# Patient Record
Sex: MALE | Race: WHITE | NOT HISPANIC OR LATINO | Employment: OTHER | URBAN - METROPOLITAN AREA
[De-identification: names, ages, dates, MRNs, and addresses within clinical notes are randomized per-mention and may not be internally consistent; named-entity substitution may affect disease eponyms.]

---

## 2019-10-22 ENCOUNTER — APPOINTMENT (OUTPATIENT)
Dept: RADIOLOGY | Facility: MEDICAL CENTER | Age: 84
DRG: 682 | End: 2019-10-22
Attending: FAMILY MEDICINE
Payer: MEDICARE

## 2019-10-22 ENCOUNTER — HOSPITAL ENCOUNTER (OUTPATIENT)
Dept: LAB | Facility: MEDICAL CENTER | Age: 84
End: 2019-10-22
Attending: FAMILY MEDICINE
Payer: MEDICARE

## 2019-10-22 DIAGNOSIS — I63.9 CEREBROVASCULAR ACCIDENT (CVA), UNSPECIFIED MECHANISM (HCC): ICD-10-CM

## 2019-10-22 LAB — CREAT SERPL-MCNC: 7.3 MG/DL (ref 0.5–1.4)

## 2019-10-22 PROCEDURE — 36415 COLL VENOUS BLD VENIPUNCTURE: CPT

## 2019-10-22 PROCEDURE — 82565 ASSAY OF CREATININE: CPT

## 2019-10-22 PROCEDURE — 70551 MRI BRAIN STEM W/O DYE: CPT

## 2019-10-24 ENCOUNTER — APPOINTMENT (OUTPATIENT)
Dept: RADIOLOGY | Facility: MEDICAL CENTER | Age: 84
DRG: 682 | End: 2019-10-24
Attending: HOSPITALIST
Payer: MEDICARE

## 2019-10-24 ENCOUNTER — HOSPITAL ENCOUNTER (INPATIENT)
Facility: MEDICAL CENTER | Age: 84
LOS: 7 days | DRG: 682 | End: 2019-10-31
Attending: EMERGENCY MEDICINE | Admitting: HOSPITALIST
Payer: MEDICARE

## 2019-10-24 DIAGNOSIS — N17.9 ACUTE RENAL FAILURE, UNSPECIFIED ACUTE RENAL FAILURE TYPE (HCC): ICD-10-CM

## 2019-10-24 PROBLEM — I10 HTN (HYPERTENSION): Status: ACTIVE | Noted: 2019-10-24

## 2019-10-24 PROBLEM — E03.9 HYPOTHYROIDISM: Status: ACTIVE | Noted: 2019-10-24

## 2019-10-24 PROBLEM — R93.89 ABNORMAL MRI: Status: ACTIVE | Noted: 2019-10-24

## 2019-10-24 PROBLEM — E78.5 HLD (HYPERLIPIDEMIA): Status: ACTIVE | Noted: 2019-10-24

## 2019-10-24 LAB
ALBUMIN SERPL BCP-MCNC: 3.1 G/DL (ref 3.2–4.9)
ALBUMIN/GLOB SERPL: 1.2 G/DL
ALP SERPL-CCNC: 53 U/L (ref 30–99)
ALT SERPL-CCNC: 10 U/L (ref 2–50)
ANION GAP SERPL CALC-SCNC: 10 MMOL/L (ref 0–11.9)
APPEARANCE UR: CLEAR
APTT PPP: 32.8 SEC (ref 24.7–36)
AST SERPL-CCNC: 7 U/L (ref 12–45)
BACTERIA #/AREA URNS HPF: NEGATIVE /HPF
BASOPHILS # BLD AUTO: 0.8 % (ref 0–1.8)
BASOPHILS # BLD: 0.05 K/UL (ref 0–0.12)
BILIRUB SERPL-MCNC: 0.4 MG/DL (ref 0.1–1.5)
BILIRUB UR QL STRIP.AUTO: NEGATIVE
BUN SERPL-MCNC: 61 MG/DL (ref 8–22)
CA-I SERPL-SCNC: 1.3 MMOL/L (ref 1.1–1.3)
CALCIUM SERPL-MCNC: 7.8 MG/DL (ref 8.5–10.5)
CHLORIDE SERPL-SCNC: 119 MMOL/L (ref 96–112)
CHLORIDE UR-SCNC: 28 MMOL/L
CO2 SERPL-SCNC: 17 MMOL/L (ref 20–33)
COLOR UR: YELLOW
CREAT SERPL-MCNC: 5.97 MG/DL (ref 0.5–1.4)
CREAT UR-MCNC: 75.3 MG/DL
EOSINOPHIL # BLD AUTO: 0.22 K/UL (ref 0–0.51)
EOSINOPHIL NFR BLD: 3.5 % (ref 0–6.9)
EPI CELLS #/AREA URNS HPF: NEGATIVE /HPF
ERYTHROCYTE [DISTWIDTH] IN BLOOD BY AUTOMATED COUNT: 53.3 FL (ref 35.9–50)
GLOBULIN SER CALC-MCNC: 2.5 G/DL (ref 1.9–3.5)
GLUCOSE SERPL-MCNC: 93 MG/DL (ref 65–99)
GLUCOSE UR STRIP.AUTO-MCNC: NEGATIVE MG/DL
HCT VFR BLD AUTO: 39.2 % (ref 42–52)
HGB BLD-MCNC: 12.6 G/DL (ref 14–18)
HYALINE CASTS #/AREA URNS LPF: ABNORMAL /LPF
IMM GRANULOCYTES # BLD AUTO: 0.02 K/UL (ref 0–0.11)
IMM GRANULOCYTES NFR BLD AUTO: 0.3 % (ref 0–0.9)
INR PPP: 1.01 (ref 0.87–1.13)
KETONES UR STRIP.AUTO-MCNC: NEGATIVE MG/DL
LEUKOCYTE ESTERASE UR QL STRIP.AUTO: ABNORMAL
LYMPHOCYTES # BLD AUTO: 0.82 K/UL (ref 1–4.8)
LYMPHOCYTES NFR BLD: 13.1 % (ref 22–41)
MCH RBC QN AUTO: 31.2 PG (ref 27–33)
MCHC RBC AUTO-ENTMCNC: 32.1 G/DL (ref 33.7–35.3)
MCV RBC AUTO: 97 FL (ref 81.4–97.8)
MICRO URNS: ABNORMAL
MONOCYTES # BLD AUTO: 0.62 K/UL (ref 0–0.85)
MONOCYTES NFR BLD AUTO: 9.9 % (ref 0–13.4)
NEUTROPHILS # BLD AUTO: 4.51 K/UL (ref 1.82–7.42)
NEUTROPHILS NFR BLD: 72.4 % (ref 44–72)
NITRITE UR QL STRIP.AUTO: NEGATIVE
NRBC # BLD AUTO: 0 K/UL
NRBC BLD-RTO: 0 /100 WBC
NT-PROBNP SERPL IA-MCNC: 949 PG/ML (ref 0–125)
PH UR STRIP.AUTO: 5 [PH] (ref 5–8)
PLATELET # BLD AUTO: 215 K/UL (ref 164–446)
PMV BLD AUTO: 10.1 FL (ref 9–12.9)
POTASSIUM SERPL-SCNC: 4.3 MMOL/L (ref 3.6–5.5)
POTASSIUM UR-SCNC: 18.5 MMOL/L
PROT SERPL-MCNC: 5.6 G/DL (ref 6–8.2)
PROT UR QL STRIP: NEGATIVE MG/DL
PROT UR-MCNC: 5.5 MG/DL (ref 0–15)
PROTHROMBIN TIME: 13.5 SEC (ref 12–14.6)
PTH-INTACT SERPL-MCNC: 173.2 PG/ML (ref 14–72)
RBC # BLD AUTO: 4.04 M/UL (ref 4.7–6.1)
RBC # URNS HPF: ABNORMAL /HPF
RBC UR QL AUTO: NEGATIVE
SODIUM SERPL-SCNC: 146 MMOL/L (ref 135–145)
SODIUM UR-SCNC: 35 MMOL/L
SP GR UR STRIP.AUTO: 1.01
UROBILINOGEN UR STRIP.AUTO-MCNC: 0.2 MG/DL
WBC # BLD AUTO: 6.2 K/UL (ref 4.8–10.8)
WBC #/AREA URNS HPF: ABNORMAL /HPF

## 2019-10-24 PROCEDURE — 82306 VITAMIN D 25 HYDROXY: CPT

## 2019-10-24 PROCEDURE — 85025 COMPLETE CBC W/AUTO DIFF WBC: CPT

## 2019-10-24 PROCEDURE — 86160 COMPLEMENT ANTIGEN: CPT

## 2019-10-24 PROCEDURE — 85730 THROMBOPLASTIN TIME PARTIAL: CPT

## 2019-10-24 PROCEDURE — A9270 NON-COVERED ITEM OR SERVICE: HCPCS | Performed by: HOSPITALIST

## 2019-10-24 PROCEDURE — 700105 HCHG RX REV CODE 258: Performed by: HOSPITALIST

## 2019-10-24 PROCEDURE — 80053 COMPREHEN METABOLIC PANEL: CPT

## 2019-10-24 PROCEDURE — 99285 EMERGENCY DEPT VISIT HI MDM: CPT

## 2019-10-24 PROCEDURE — 99223 1ST HOSP IP/OBS HIGH 75: CPT | Performed by: PSYCHIATRY & NEUROLOGY

## 2019-10-24 PROCEDURE — 84133 ASSAY OF URINE POTASSIUM: CPT

## 2019-10-24 PROCEDURE — 81002 URINALYSIS NONAUTO W/O SCOPE: CPT | Performed by: EMERGENCY MEDICINE

## 2019-10-24 PROCEDURE — 83970 ASSAY OF PARATHORMONE: CPT

## 2019-10-24 PROCEDURE — 84300 ASSAY OF URINE SODIUM: CPT

## 2019-10-24 PROCEDURE — 700111 HCHG RX REV CODE 636 W/ 250 OVERRIDE (IP): Performed by: HOSPITALIST

## 2019-10-24 PROCEDURE — 85610 PROTHROMBIN TIME: CPT

## 2019-10-24 PROCEDURE — 83880 ASSAY OF NATRIURETIC PEPTIDE: CPT

## 2019-10-24 PROCEDURE — 82436 ASSAY OF URINE CHLORIDE: CPT

## 2019-10-24 PROCEDURE — 76775 US EXAM ABDO BACK WALL LIM: CPT

## 2019-10-24 PROCEDURE — 82570 ASSAY OF URINE CREATININE: CPT

## 2019-10-24 PROCEDURE — 99223 1ST HOSP IP/OBS HIGH 75: CPT | Mod: AI | Performed by: HOSPITALIST

## 2019-10-24 PROCEDURE — 82330 ASSAY OF CALCIUM: CPT

## 2019-10-24 PROCEDURE — 700102 HCHG RX REV CODE 250 W/ 637 OVERRIDE(OP): Performed by: HOSPITALIST

## 2019-10-24 PROCEDURE — 81001 URINALYSIS AUTO W/SCOPE: CPT

## 2019-10-24 PROCEDURE — 770006 HCHG ROOM/CARE - MED/SURG/GYN SEMI*

## 2019-10-24 PROCEDURE — 84156 ASSAY OF PROTEIN URINE: CPT

## 2019-10-24 RX ORDER — M-VIT,TX,IRON,MINS/CALC/FOLIC 27MG-0.4MG
1 TABLET ORAL DAILY
COMMUNITY

## 2019-10-24 RX ORDER — LEVOTHYROXINE SODIUM 88 UG/1
88 TABLET ORAL
COMMUNITY

## 2019-10-24 RX ORDER — SODIUM CHLORIDE, SODIUM LACTATE, POTASSIUM CHLORIDE, CALCIUM CHLORIDE 600; 310; 30; 20 MG/100ML; MG/100ML; MG/100ML; MG/100ML
INJECTION, SOLUTION INTRAVENOUS CONTINUOUS
Status: DISCONTINUED | OUTPATIENT
Start: 2019-10-24 | End: 2019-10-31 | Stop reason: HOSPADM

## 2019-10-24 RX ORDER — SIMVASTATIN 20 MG
20 TABLET ORAL NIGHTLY
COMMUNITY

## 2019-10-24 RX ORDER — BISACODYL 10 MG
10 SUPPOSITORY, RECTAL RECTAL
Status: DISCONTINUED | OUTPATIENT
Start: 2019-10-24 | End: 2019-10-31 | Stop reason: HOSPADM

## 2019-10-24 RX ORDER — FUROSEMIDE 20 MG/1
20 TABLET ORAL EVERY MORNING
COMMUNITY

## 2019-10-24 RX ORDER — ACETAMINOPHEN 325 MG/1
650 TABLET ORAL EVERY 6 HOURS PRN
Status: DISCONTINUED | OUTPATIENT
Start: 2019-10-24 | End: 2019-10-31 | Stop reason: HOSPADM

## 2019-10-24 RX ORDER — POLYETHYLENE GLYCOL 3350 17 G/17G
1 POWDER, FOR SOLUTION ORAL
Status: DISCONTINUED | OUTPATIENT
Start: 2019-10-24 | End: 2019-10-31 | Stop reason: HOSPADM

## 2019-10-24 RX ORDER — AMOXICILLIN 250 MG
2 CAPSULE ORAL 2 TIMES DAILY
Status: DISCONTINUED | OUTPATIENT
Start: 2019-10-24 | End: 2019-10-31 | Stop reason: HOSPADM

## 2019-10-24 RX ORDER — LOSARTAN POTASSIUM 100 MG/1
100 TABLET ORAL DAILY
COMMUNITY

## 2019-10-24 RX ORDER — ALLOPURINOL 100 MG/1
100 TABLET ORAL DAILY
COMMUNITY

## 2019-10-24 RX ORDER — HEPARIN SODIUM 5000 [USP'U]/ML
5000 INJECTION, SOLUTION INTRAVENOUS; SUBCUTANEOUS EVERY 8 HOURS
Status: DISCONTINUED | OUTPATIENT
Start: 2019-10-24 | End: 2019-10-25

## 2019-10-24 RX ADMIN — SODIUM CHLORIDE, POTASSIUM CHLORIDE, SODIUM LACTATE AND CALCIUM CHLORIDE: 600; 310; 30; 20 INJECTION, SOLUTION INTRAVENOUS at 23:55

## 2019-10-24 RX ADMIN — SENNOSIDES AND DOCUSATE SODIUM 2 TABLET: 8.6; 5 TABLET ORAL at 23:55

## 2019-10-24 RX ADMIN — HEPARIN SODIUM 5000 UNITS: 5000 INJECTION, SOLUTION INTRAVENOUS; SUBCUTANEOUS at 23:55

## 2019-10-24 ASSESSMENT — ENCOUNTER SYMPTOMS
WEAKNESS: 1
CHILLS: 0
TINGLING: 0
DEPRESSION: 0
SORE THROAT: 0
ABDOMINAL PAIN: 0
SHORTNESS OF BREATH: 0
HEADACHES: 0
DIZZINESS: 0
WHEEZING: 0
FOCAL WEAKNESS: 0
VOMITING: 0
COUGH: 0
DIARRHEA: 0
MYALGIAS: 0
FEVER: 0
NAUSEA: 0
PHOTOPHOBIA: 0
PALPITATIONS: 0

## 2019-10-24 ASSESSMENT — LIFESTYLE VARIABLES: DO YOU DRINK ALCOHOL: NO

## 2019-10-25 LAB
25(OH)D3 SERPL-MCNC: 39 NG/ML (ref 30–100)
ANION GAP SERPL CALC-SCNC: 9 MMOL/L (ref 0–11.9)
BUN SERPL-MCNC: 65 MG/DL (ref 8–22)
C3 SERPL-MCNC: 126 MG/DL (ref 87–200)
C4 SERPL-MCNC: 27 MG/DL (ref 19–52)
CALCIUM SERPL-MCNC: 9.2 MG/DL (ref 8.5–10.5)
CHLORIDE SERPL-SCNC: 117 MMOL/L (ref 96–112)
CO2 SERPL-SCNC: 18 MMOL/L (ref 20–33)
CREAT SERPL-MCNC: 6.49 MG/DL (ref 0.5–1.4)
ERYTHROCYTE [DISTWIDTH] IN BLOOD BY AUTOMATED COUNT: 52.6 FL (ref 35.9–50)
GLUCOSE SERPL-MCNC: 92 MG/DL (ref 65–99)
HCT VFR BLD AUTO: 37.7 % (ref 42–52)
HGB BLD-MCNC: 12.2 G/DL (ref 14–18)
MCH RBC QN AUTO: 31.6 PG (ref 27–33)
MCHC RBC AUTO-ENTMCNC: 32.4 G/DL (ref 33.7–35.3)
MCV RBC AUTO: 97.7 FL (ref 81.4–97.8)
PLATELET # BLD AUTO: 187 K/UL (ref 164–446)
PMV BLD AUTO: 10.4 FL (ref 9–12.9)
POTASSIUM SERPL-SCNC: 4.5 MMOL/L (ref 3.6–5.5)
RBC # BLD AUTO: 3.86 M/UL (ref 4.7–6.1)
SODIUM SERPL-SCNC: 144 MMOL/L (ref 135–145)
WBC # BLD AUTO: 5.4 K/UL (ref 4.8–10.8)

## 2019-10-25 PROCEDURE — 36415 COLL VENOUS BLD VENIPUNCTURE: CPT

## 2019-10-25 PROCEDURE — 99223 1ST HOSP IP/OBS HIGH 75: CPT | Performed by: INTERNAL MEDICINE

## 2019-10-25 PROCEDURE — A9270 NON-COVERED ITEM OR SERVICE: HCPCS | Performed by: HOSPITALIST

## 2019-10-25 PROCEDURE — 700102 HCHG RX REV CODE 250 W/ 637 OVERRIDE(OP): Performed by: HOSPITALIST

## 2019-10-25 PROCEDURE — A4357 BEDSIDE DRAINAGE BAG: HCPCS | Performed by: HOSPITALIST

## 2019-10-25 PROCEDURE — 770006 HCHG ROOM/CARE - MED/SURG/GYN SEMI*

## 2019-10-25 PROCEDURE — 85027 COMPLETE CBC AUTOMATED: CPT

## 2019-10-25 PROCEDURE — 80048 BASIC METABOLIC PNL TOTAL CA: CPT

## 2019-10-25 PROCEDURE — 700105 HCHG RX REV CODE 258: Performed by: HOSPITALIST

## 2019-10-25 PROCEDURE — 99233 SBSQ HOSP IP/OBS HIGH 50: CPT | Performed by: HOSPITALIST

## 2019-10-25 RX ORDER — TAMSULOSIN HYDROCHLORIDE 0.4 MG/1
0.4 CAPSULE ORAL
Status: DISCONTINUED | OUTPATIENT
Start: 2019-10-25 | End: 2019-10-31 | Stop reason: HOSPADM

## 2019-10-25 RX ORDER — OMEPRAZOLE 20 MG/1
40 CAPSULE, DELAYED RELEASE ORAL DAILY
COMMUNITY

## 2019-10-25 RX ORDER — HEPARIN SODIUM 5000 [USP'U]/ML
5000 INJECTION, SOLUTION INTRAVENOUS; SUBCUTANEOUS EVERY 8 HOURS
Status: DISCONTINUED | OUTPATIENT
Start: 2019-10-25 | End: 2019-10-27 | Stop reason: ALTCHOICE

## 2019-10-25 RX ORDER — LEVOTHYROXINE SODIUM 88 UG/1
88 TABLET ORAL
Status: DISCONTINUED | OUTPATIENT
Start: 2019-10-26 | End: 2019-10-31 | Stop reason: HOSPADM

## 2019-10-25 RX ADMIN — TAMSULOSIN HYDROCHLORIDE 0.4 MG: 0.4 CAPSULE ORAL at 13:29

## 2019-10-25 RX ADMIN — SODIUM CHLORIDE, POTASSIUM CHLORIDE, SODIUM LACTATE AND CALCIUM CHLORIDE: 600; 310; 30; 20 INJECTION, SOLUTION INTRAVENOUS at 11:00

## 2019-10-25 ASSESSMENT — ENCOUNTER SYMPTOMS
PHOTOPHOBIA: 0
COUGH: 0
WEAKNESS: 1
DIZZINESS: 0
FOCAL WEAKNESS: 0
NAUSEA: 0
FLANK PAIN: 0
PALPITATIONS: 0
WHEEZING: 0
FEVER: 0
VOMITING: 0
HEADACHES: 0
CHILLS: 0
ABDOMINAL PAIN: 0
MEMORY LOSS: 1
TINGLING: 0
SHORTNESS OF BREATH: 0
MYALGIAS: 0
SORE THROAT: 0
DIARRHEA: 0
DEPRESSION: 0

## 2019-10-25 ASSESSMENT — PATIENT HEALTH QUESTIONNAIRE - PHQ9
1. LITTLE INTEREST OR PLEASURE IN DOING THINGS: NOT AT ALL
2. FEELING DOWN, DEPRESSED, IRRITABLE, OR HOPELESS: NOT AT ALL
SUM OF ALL RESPONSES TO PHQ9 QUESTIONS 1 AND 2: 0

## 2019-10-25 ASSESSMENT — LIFESTYLE VARIABLES
HAVE YOU EVER FELT YOU SHOULD CUT DOWN ON YOUR DRINKING: NO
TOTAL SCORE: 0
ON A TYPICAL DAY WHEN YOU DRINK ALCOHOL HOW MANY DRINKS DO YOU HAVE: 2
EVER FELT BAD OR GUILTY ABOUT YOUR DRINKING: NO
ALCOHOL_USE: YES
HOW MANY TIMES IN THE PAST YEAR HAVE YOU HAD 5 OR MORE DRINKS IN A DAY: 0
DOES PATIENT WANT TO STOP DRINKING: NO
EVER_SMOKED: NEVER
TOTAL SCORE: 0
CONSUMPTION TOTAL: NEGATIVE
EVER HAD A DRINK FIRST THING IN THE MORNING TO STEADY YOUR NERVES TO GET RID OF A HANGOVER: NO
HAVE PEOPLE ANNOYED YOU BY CRITICIZING YOUR DRINKING: NO
TOTAL SCORE: 0
AVERAGE NUMBER OF DAYS PER WEEK YOU HAVE A DRINK CONTAINING ALCOHOL: 7

## 2019-10-25 ASSESSMENT — COGNITIVE AND FUNCTIONAL STATUS - GENERAL
STANDING UP FROM CHAIR USING ARMS: A LITTLE
HELP NEEDED FOR BATHING: A LITTLE
SUGGESTED CMS G CODE MODIFIER DAILY ACTIVITY: CJ
SUGGESTED CMS G CODE MODIFIER MOBILITY: CK
TURNING FROM BACK TO SIDE WHILE IN FLAT BAD: A LITTLE
CLIMB 3 TO 5 STEPS WITH RAILING: A LITTLE
DAILY ACTIVITIY SCORE: 20
WALKING IN HOSPITAL ROOM: A LITTLE
MOBILITY SCORE: 18
MOVING FROM LYING ON BACK TO SITTING ON SIDE OF FLAT BED: A LITTLE
DRESSING REGULAR UPPER BODY CLOTHING: A LITTLE
TOILETING: A LITTLE
DRESSING REGULAR LOWER BODY CLOTHING: A LITTLE
MOVING TO AND FROM BED TO CHAIR: A LITTLE

## 2019-10-25 NOTE — PROGRESS NOTES
Report received from Jarad HERNANDEZ. Pt is almost done with Renal Ultrasound and then transport will bring pt to the unit.

## 2019-10-25 NOTE — ED PROVIDER NOTES
ED Provider Note    CHIEF COMPLAINT  Chief Complaint   Patient presents with   • Sent by MD DYE  Efra Soto is a 84 y.o. male who presents with difficulty performing his daily activities.  The patient states that about a week ago he started having difficulty dressing himself.  The patient's wife had to dress him and therefore they schedule an appointment with the primary care provider.  The patient had an outpatient MRI performed that was abnormal and the patient was instructed to come to the emergency department.  He states he also has some forgetfulness.  He does not have any pain.  He does not have any focal loss of function but states that globally he cannot perform his daily activities.  The patient denies recent alcohol abuse.  He states he does have hypertension but no other medical problems.  He does not take any anticoagulants.  He has not had any chest pain, dyspnea, nor palpitations.  The patient has not had any recent vomiting or diarrhea.    REVIEW OF SYSTEMS  See HPI for further details. All other systems are negative.     PAST MEDICAL HISTORY  No past medical history on file.    [email or web address]    SOCIAL HISTORY  Social History     Socioeconomic History   • Marital status:      Spouse name: Not on file   • Number of children: Not on file   • Years of education: Not on file   • Highest education level: Not on file   Occupational History   • Not on file   Social Needs   • Financial resource strain: Not on file   • Food insecurity:     Worry: Not on file     Inability: Not on file   • Transportation needs:     Medical: Not on file     Non-medical: Not on file   Tobacco Use   • Smoking status: Not on file   Substance and Sexual Activity   • Alcohol use: Not on file   • Drug use: Not on file   • Sexual activity: Not on file   Lifestyle   • Physical activity:     Days per week: Not on file     Minutes per session: Not on file   • Stress: Not on file   Relationships   • Social  "connections:     Talks on phone: Not on file     Gets together: Not on file     Attends Buddhism service: Not on file     Active member of club or organization: Not on file     Attends meetings of clubs or organizations: Not on file     Relationship status: Not on file   • Intimate partner violence:     Fear of current or ex partner: Not on file     Emotionally abused: Not on file     Physically abused: Not on file     Forced sexual activity: Not on file   Other Topics Concern   • Not on file   Social History Narrative   • Not on file       SURGICAL HISTORY  No past surgical history on file.    CURRENT MEDICATIONS  Home Medications    **Home medications have not yet been reviewed for this encounter**         ALLERGIES  No Known Allergies    PHYSICAL EXAM  VITAL SIGNS: /90   Pulse 97   Temp 36.4 °C (97.5 °F) (Temporal)   Resp 20   Ht 1.778 m (5' 10\")   Wt 115.2 kg (254 lb)   SpO2 96%   BMI 36.45 kg/m²       Constitutional: Well developed, Well nourished, No acute distress, Non-toxic appearance.   HENT: Normocephalic, Atraumatic, Bilateral external ears normal, Oropharynx moist, No oral exudates, Nose normal.   Eyes: PERRLA, EOMI, Conjunctiva normal, No discharge.   Neck: Normal range of motion, No tenderness, Supple, No stridor.   Lymphatic: No lymphadenopathy noted.   Cardiovascular: Normal heart rate, Normal rhythm, No murmurs, No rubs, No gallops.   Thorax & Lungs: Normal breath sounds, No respiratory distress, No wheezing, No chest tenderness.   Abdomen: Bowel sounds normal, Soft, No tenderness, No masses, No pulsatile masses.   Skin: Warm, Dry, No erythema, No rash.   Back: No tenderness, No CVA tenderness.    Extremities: Intact distal pulses, No edema, No tenderness, No cyanosis, No clubbing.   Musculoskeletal: Good range of motion in all major joints. No tenderness to palpation or major deformities noted.   Neurologic: Alert & oriented x 3, Normal motor function, Normal sensory function, the " patient does have difficulty with finger-to-nose testing as well as heel-to-shin.  Otherwise he is neurologically intact.  Psychiatric: Affect normal, Judgment normal, Mood normal.     COURSE & MEDICAL DECISION MAKING  Pertinent Labs & Imaging studies reviewed. (See chart for details)  This is an 84-year-old male who presents the emergency department for evaluation for difficulty with performing his daily activities.  The patient had an MRI that was abnormal please see the radiologist report.  I did speak with neurology and they are concerned for possible metabolic etiology as the patient was noted to have an elevation in his creatinine prior to the MRI.  The patient's metabolic panel does show acute renal failure and I suspect this is the source.  Neurology has been consulted.  The patient be admitted to the medicine team.  He is in stable condition and his neurologic exam is unchanged at the time of admission.  Clinically do not appreciate any evidence of an infectious process.    FINAL IMPRESSION  1.  Acute renal failure    Disposition  The patient will be admitted in stable condition      Electronically signed by: Wyatt Lama, 10/24/2019 8:13 PM

## 2019-10-25 NOTE — PROGRESS NOTES
Assumed patient care at 0700. Received report from night shift. Assessment completed. A&Ox4. Denies pain this shift. CBI running. Nephrology and urology following. IV fluids infusing. Fall precautions in place; pt wearing treaded socks, personal possessions and call light placed within reach.  POC discussed with pt, communication board updated. Patient denies any additional needs at this time.

## 2019-10-25 NOTE — ASSESSMENT & PLAN NOTE
Likely due to acute metabolic abnormalities such as uremia in the light of JAIRON  Neurology consulted on admission, will treat metabolic factor first and then reasses  Mental status gradually improving, alert and oriented x3 no need for repeating MRI at this time.

## 2019-10-25 NOTE — ED NOTES
"Agree with triage assessment, no changes noted. Pt reports \"some weakness in left side and numbness in fingers.\" Pt reports pain of 0 out of 10. Pt hooked to monitor. Pt denies any further needs at this time. Call light within reach. Bed in low locked position. Comfort measures provided.     "

## 2019-10-25 NOTE — ED NOTES
US tech informed This RN that the Pt's bladder had over 2L in it. Called to hospitalist for straight cath order and was given order for lockhart placement. Lockhart palced without incident. 2300 mL out. UA sent to Lab. Floor RN updated.

## 2019-10-25 NOTE — CARE PLAN
Problem: Knowledge Deficit  Goal: Knowledge of disease process/condition, treatment plan, diagnostic tests, and medications will improve  Note:   POC discussed with patient during bedside rounds. Questions/concerns addressed.      Problem: Urinary Elimination:  Goal: Ability to reestablish a normal urinary elimination pattern will improve  Note:   Three-way lockhart with CBI in place. Flomax started.

## 2019-10-25 NOTE — ED NOTES
Pt resting in bed with no complaints at this time. Pt to be admitted and is awaiting a room assignment. Discussed POC with Pt. Pt verbalizes understanding. Pt denies any needs at this time. Pt's call light is within reach and bed is in low locked position.

## 2019-10-25 NOTE — H&P
"Hospital Medicine History & Physical Note    Date of Service  10/24/2019    Primary Care Physician  Vini Menjivar M.D.    Consultants  Dr. Lutz, neurology    Code Status  Full    Chief Complaint  Chief Complaint   Patient presents with   • Sent by MD       History of Presenting Illness  84 y.o. male who presented on 10/24/2019 after being sent by his primary care provider for abnormal MRI of the brain.  This is a pleasant gentleman who is alert and oriented at the time of my visit at bedside.  He was sent by his primary care provider for behavioral changes that began in the last month.  The patient states that he woke up one morning and was suddenly unable to \"put on my pants\".  No family members are at bedside to confirm this however the patient does state that he has been feeling more confused than usual, and has had difficulty completing his ADLs which has not been an issue for him in the past.  He denies any focal weaknesses, he has no problems finding the correct words to use or understanding conversations.  He said no headaches or vision changes.  However because of this, he was seen by his primary care provider and MRI was completed.  Additionally, the patient was also recently diagnosed with kidney disease but states that he is not been referred to nephrology and nor has any further work-up been done.  He denies any recent use of contrasted studies and denies any NSAID use.    Review of Systems  Review of Systems   Constitutional: Negative for chills and fever.   HENT: Negative for congestion and sore throat.    Eyes: Negative for photophobia.   Respiratory: Negative for cough, shortness of breath and wheezing.    Cardiovascular: Negative for chest pain and palpitations.   Gastrointestinal: Negative for abdominal pain, diarrhea, nausea and vomiting.   Genitourinary: Negative for dysuria.   Musculoskeletal: Negative for myalgias.   Skin: Negative.    Neurological: Positive for weakness. Negative for " dizziness, tingling, focal weakness and headaches.        Confusion   Psychiatric/Behavioral: Negative for depression and suicidal ideas.       Past Medical History  Hypothyroidism, gout, hyperlipidemia    Surgical History  Patient denies    Family History  Brother committed suicide in July, mother  from an MI in the     Social History  Patient denies any tobacco, stopped drinking several months ago    Allergies  No Known Allergies    Medications  No current facility-administered medications on file prior to encounter.      No current outpatient medications on file prior to encounter.       Physical Exam  Hemodynamics  Temp (24hrs), Av.4 °C (97.5 °F), Min:36.4 °C (97.5 °F), Max:36.4 °C (97.5 °F)   Temperature: 36.4 °C (97.5 °F)  Pulse  Av  Min: 93  Max: 98    Blood Pressure : 140/100      Respiratory      Respiration: 20, Pulse Oximetry: 99 %             Physical Exam   Constitutional: He is oriented to person, place, and time. No distress.   HENT:   Head: Normocephalic and atraumatic.   Right Ear: External ear normal.   Left Ear: External ear normal.   Eyes: EOM are normal. Right eye exhibits no discharge. Left eye exhibits no discharge.   Neck: Neck supple. No JVD present.   Cardiovascular: Normal rate, regular rhythm and normal heart sounds.   Pulmonary/Chest: Effort normal and breath sounds normal. No respiratory distress. He exhibits no tenderness.   Abdominal: Soft. Bowel sounds are normal. He exhibits no distension. There is no tenderness.   Musculoskeletal: He exhibits no edema.   Neurological: He is alert and oriented to person, place, and time. No cranial nerve deficit.   Skin: Skin is dry. He is not diaphoretic. No erythema.   Psychiatric: He has a normal mood and affect. His behavior is normal.   Nursing note and vitals reviewed.    Capillary refill less than 3 seconds, distal pulses intact    Laboratory:  Recent Labs     10/24/19  2000   WBC 6.2   RBC 4.04*   HEMOGLOBIN 12.6*    HEMATOCRIT 39.2*   MCV 97.0   MCH 31.2   MCHC 32.1*   RDW 53.3*   PLATELETCT 215   MPV 10.1     Recent Labs     10/22/19  1320 10/24/19  2000   SODIUM  --  146*   POTASSIUM  --  4.3   CHLORIDE  --  119*   CO2  --  17*   GLUCOSE  --  93   BUN  --  61*   CREATININE 7.30* 5.97*   CALCIUM  --  7.8*     Recent Labs     10/24/19  2000   ALTSGPT 10   ASTSGOT 7*   ALKPHOSPHAT 53   TBILIRUBIN 0.4   GLUCOSE 93     Recent Labs     10/24/19  2000   APTT 32.8   INR 1.01             No results found for: TROPONINI    Imaging  Mr-brain-w/o    Result Date: 10/23/2019  10/22/2019 4:50 PM HISTORY/REASON FOR EXAM:  Cerebrovascular accident (CVA), unspecified mechanism (HCC). Expressive aphasia TECHNIQUE/EXAM DESCRIPTION: MRI of the brain without contrast. T1 sagittal, T2 fast spin-echo axial, T1 coronal, FLAIR coronal, diffusion-weighted and apparent diffusion coefficient (ADC map) axial images were obtained of the whole brain. The study was performed on a Siemens Skyra 3.0 Gricelda MRI scanner. COMPARISON:  None. FINDINGS:  There is gyriform/cortical restricted diffusion involving the bilateral parietal occipital and posterior temporal lobes as well as probably involving the left insular ribbon and mild within the left frontal lobe. There is subtle associated T2 FLAIR hyperintensity. The etiology of this is uncertain however the appearance and bilaterality is atypical for arterial or venous infarct. There is no significant involvement of the deep gray structures. Differential considerations would include seizure  activity/status epilepticus, hypoglycemic encephalopathy, hepatic encephalopathy, hypoxic ischemic injury, Creutzfeldt Thang disease among other things. Short-term follow-up is recommended. Mild to moderate generalized volume loss.  Scattered small T2 hyperintensities in periventricular and subcortical cerebral white matter are nonspecific, most likely mild-to-moderate chronic microvascular ischemic changes. Old small left  parietal lobe encephalomalacia with blooming seen on GRE which could be related to old hemorrhage/infarct. No acute intracranial hemorrhage, extra-axial fluid collection, mass effect, midline shift or hydrocephalus. Proximal vascular flow voids are patent. Paranasal sinuses and mastoids are clear.  Bone marrow signal is normal. Orbital contents are symmetric.     1.  Gyriform/cortical restricted diffusion involving the bilateral parietal, occipital and temporal lobes as well as the left insular ribbon and frontal lobe. This is of uncertain etiology, with differential considerations as detailed above. Short-term follow-up is recommended. 2.  Mild to moderate generalized atrophy and chronic microvascular ischemic type changes. 3.  Small area of encephalomalacia in the left parietal lobe likely related to old hemorrhage/infarct. These findings were discussed with Dr. REMY DOWNS on 10/22/2019 5:15 PM.        Assessment/Plan:  Anticipate that patient will need greater than 2 midnights for management of the discussed medical issues.    * JAIRON (acute kidney injury) (HCC)  Assessment & Plan  Patient states that he was diagnosed with kidney disease recently but had not been referred to nephrology.  I do not have any previous records to confirm but he did have rather significant kidney disease 2 days ago.  We bladder scanned at the patient in the emergency room and he is retaining 2500 cc of urine.  At this point, suspicion would be high for postobstructive disease.  I have requested a Rosa catheter placement and will monitor strict intake and output.  I will check urine electrolytes, PTH, vitamin D, complement levels, BNP, urinalysis, and a renal ultrasound.  I will continue to fluid hydrate him overnight and will repeat chemistries in the morning.  At this point, he does not require emergent dialysis.  He has no improvement then we will plan to consult nephrology.    Abnormal MRI  Assessment & Plan  Given his change  in mentation, his PCP had ordered an outpatient MRI of the brain.  This is come back to show some type of bilateral diffusion abnormality of unknown etiology.  Dr. Lutz of neurology was consulted by the emergency room physician I appreciate his recommendations.  In the meantime, monitor closely with every 4 hour neurology checks.    HLD (hyperlipidemia)  Assessment & Plan  This is chronic, okay to continue home Zocor.    HTN (hypertension)  Assessment & Plan  Holding home losartan for now in light of his JAIRON, monitor blood pressures and add PRN IV hydralazine if needed    Hypothyroidism  Assessment & Plan  Patient does not remember his home dose of his levothyroxine, we will need to obtain updated information from his pharmacy in the morning and resume his home medications.      Prophylaxis: Heparin for DVT prophylaxis, no PPI indicated, bowel protocol as needed

## 2019-10-25 NOTE — ED TRIAGE NOTES
Pt sent for abnormal MRI results of the brain. Pt had memory issues, forgot how to get dressed starting 10/7 with generalized weakness. Pt with shirt on backwards today and incontinent of urine. Pt sent for neurology.

## 2019-10-25 NOTE — CONSULTS
"Hospital Neurology Consult:    Referring Physician: Wyatt Lama M.D.    Reason for consultation: abnormal MRI    HPI: Efra Soto is a 84 y.o. male with history of hypertension, hypothyroidism presenting to the hospital for abnormal MRI and consulted for abnormal MRI/confusion.  Over the past month, the patient has been experiencing worsening cognition.  Specifically as it relates to planning of tasks.  It started with difficulty paying his bills.  He states that he \"has the money, knows he has to pay the bill but cannot wrap his head around paying the bill\".  Approximately 1 week ago, the patient started having difficulty putting his pants on.  He states that his wife who currently has dementia has been helping him do this.  Of note, he is also having some difficulty putting on his shirt however this is more inconsistent.  He does not notice any other deficits such as numbness, weakness, dizziness, double vision.  The patient had an MRI of the brain without contrast performed on 10/24/2019 with findings of restricted diffusion along the cortex and he was sent to the emergency department for further evaluation.    ROS:     As above. All other systems reviewed and are negative.    Past Medical History:   As above    FHx:  No family history of dementia    SHx:   Lives at home with his wife who is demented.  He is a non-smoker.    Allergies:  No Known Allergies    Medications:    Current Facility-Administered Medications:   •  senna-docusate (PERICOLACE or SENOKOT S) 8.6-50 MG per tablet 2 Tab, 2 Tab, Oral, BID **AND** polyethylene glycol/lytes (MIRALAX) PACKET 1 Packet, 1 Packet, Oral, QDAY PRN **AND** magnesium hydroxide (MILK OF MAGNESIA) suspension 30 mL, 30 mL, Oral, QDAY PRN **AND** bisacodyl (DULCOLAX) suppository 10 mg, 10 mg, Rectal, QDAY PRN, Emma Boogie M.D.  •  lactated ringers infusion, 2,000 mL, Intravenous, Continuous, Emma Boogie M.D.  •  heparin injection 5,000 Units, 5,000 Units, " "Subcutaneous, Q8HRS, Emma Boogie M.D.  •  acetaminophen (TYLENOL) tablet 650 mg, 650 mg, Oral, Q6HRS PRN, Emma Boogie M.D.  No current outpatient medications on file.    Vitals:   Vitals:    10/24/19 1855 10/24/19 1856 10/24/19 1948 10/24/19 2032   BP: 132/85  139/90 140/100   Pulse: 98  97 93   Resp: 16  20    Temp: 36.4 °C (97.5 °F)      TempSrc: Temporal      SpO2: 94%  96% 99%   Weight:  115.2 kg (254 lb)     Height: 1.778 m (5' 10\")          Labs:  Lab Results   Component Value Date/Time    PROTHROMBTM 13.5 10/24/2019 08:00 PM    INR 1.01 10/24/2019 08:00 PM      Lab Results   Component Value Date/Time    WBC 6.2 10/24/2019 08:00 PM    RBC 4.04 (L) 10/24/2019 08:00 PM    HEMOGLOBIN 12.6 (L) 10/24/2019 08:00 PM    HEMATOCRIT 39.2 (L) 10/24/2019 08:00 PM    MCV 97.0 10/24/2019 08:00 PM    MCH 31.2 10/24/2019 08:00 PM    MCHC 32.1 (L) 10/24/2019 08:00 PM    MPV 10.1 10/24/2019 08:00 PM    NEUTSPOLYS 72.40 (H) 10/24/2019 08:00 PM    LYMPHOCYTES 13.10 (L) 10/24/2019 08:00 PM    MONOCYTES 9.90 10/24/2019 08:00 PM    EOSINOPHILS 3.50 10/24/2019 08:00 PM    BASOPHILS 0.80 10/24/2019 08:00 PM      Lab Results   Component Value Date/Time    SODIUM 146 (H) 10/24/2019 08:00 PM    POTASSIUM 4.3 10/24/2019 08:00 PM    CHLORIDE 119 (H) 10/24/2019 08:00 PM    CO2 17 (L) 10/24/2019 08:00 PM    GLUCOSE 93 10/24/2019 08:00 PM    BUN 61 (H) 10/24/2019 08:00 PM    CREATININE 5.97 (HH) 10/24/2019 08:00 PM          Lab Results   Component Value Date/Time    ALKPHOSPHAT 53 10/24/2019 08:00 PM    ASTSGOT 7 (L) 10/24/2019 08:00 PM    ALTSGPT 10 10/24/2019 08:00 PM    TBILIRUBIN 0.4 10/24/2019 08:00 PM        Imaging/Testing:  MRI of the brain without contrast on 10/24/2019 was personally reviewed which showed restricted diffusion in the bilateral parietal occipital regions and mild generalized atrophy.    Physical Exam:     General: Well-appearing 84-year-old male in bed no acute distress  Cardio: Normal S1/S2. No peripheral " edema.   Pulm: CTAX2. No respiratory distress.   Skin: Warm, dry, no rashes or lesions   Psychiatric: Appropriate affect. No active psychosis.  HEENT: Atraumatic head, normal sclera and conjunctiva, moist oral mucosa. No lid lag.  Abdomen: Soft, non tender. No masses or hepatosplenomegaly.    Neurologic:  Mental Status: Awake alert and oriented.  Able to follow commands.  Difficulty with three-step commands.  Speech is fluent and non-dysarthric language functions are intact.  Does not have neglect.  Cranial Nerves:  PERRL. EOMi. Face symmetric, palate/tongue midline. Visual fields full to confrontation. Facial sensation intact.   Motor:  Normal muscle tone and bulk. Strength is 5/5 throughout. No abnormal movements.  Reflexes:  2/4 throughout. Flexor plantar responses bilaterally. Absent Bundy's reflex.  Coordination: Finger-to-nose without ataxia  Sensation: Normal to light touch throughout  Gait/Station: Deferred    Assessment/Plan:    Efra Soto is a 84 y.o. male with history of hypertension, hypothyroidism presenting to the hospital for abnormal MRI and consulted for abnormal MRI/confusion.  The patient's MRI showed restricted diffusion in the bilateral parietal occipital region which carries a extensive differential including Creutzfeldt Thang, metabolic encephalopathy, or potential seizure activity.  Of note, prior to MRI being done the patient's creatinine was noted to be 7.  In this regard, it is most likely that the imaging findings are secondary to an underlying encephalopathy due to undiagnosed kidney disease and uremia.  If after correction of these underlying problems the patient's mental status remains unimproved an MRI remains the same then other potential etiologies including Creutzfeldt Thang may be considered.    Plan:  1.  Cognitive deficits likely secondary to metabolic encephalopathy from uremia/kidney disease  2.  Correction of kidney disease per internal medicine  3.  If after  correction of underlying metabolic abnormality the patient's mental status does not improve/MRI is unimproved, call neurology for further recommendations.  4.  Plan discussed with consulting physician and patient's nurse.    Manfred Galvez M.D., Diplomat of the American Board of Psychiatry and Neurology  Diplomat of [facility]   Assistant Clinical Professor, [facility] Neurology Consultant

## 2019-10-25 NOTE — PROGRESS NOTES
· 2 RN skin check complete with Joycelyn RN  · Devices in place- PIV, lockhart catheter, glasses, SCDs   · Skin assessed under devices- yes  · Confirmed pressure ulcers found on N/A  · Also noted- Redness on nose where glasses sit and above right ear- blanching; redness on bottom- blanching   · The following interventions are in place - pressure redistribution mattress, remove glasses while pt is sleeping, q 2 hour turns

## 2019-10-25 NOTE — CONSULTS
DATE OF SERVICE:  10/25/2019    REQUESTING PHYSICIAN:  Elin Marshall MD    REASON FOR CONSULTATION:  Management of acute kidney injury.    The patient seen and examined, medical record reviewed.    HISTORY OF PRESENT ILLNESS:  The patient is a very pleasant 84-year-old   gentleman with a past medical history significant for longstanding   hypertension, history of prostate cancer, developed some change in mental   status about a week ago, had an MRI of the brain, which showed possible   stroke, patient presented to the hospital yesterday sent by his daughter for   encephalopathy and acute kidney injury.  His creatinine was 6.49 earlier   today.  Patient had an abdominal imaging, which showed severe urinary   retention.  He had a Rosa catheter placed and since the Rosa catheter was   placed, he had about 9+ liters of urine output.  Patient is doing better   mentally.  He is still vague on some details, but in general is alert and   oriented x3.  No fever, no chills.  No clear hematuria.    Patient has no recent use of NSAIDs or IV contrast.    PAST MEDICAL HISTORY:  Significant for:  1.  Hypothyroidism.  2.  Gout.    ALLERGIES:  No known drug allergies.    SOCIAL HISTORY:  Patient is .    MEDICATIONS:  Reviewed.    REVIEW OF SYSTEMS:  Patient is weak.  He has decreased appetite.  All other   review of systems were negative except outlined in the history of present   illness.    PHYSICAL EXAMINATION:  GENERAL:  Patient is cachectic, but no apparent distress.  VITAL SIGNS:  Showed blood pressure of 118/87, heart rate was 69, respiratory   rate was 16.  HEENT:  Normocephalic, atraumatic.  Sclerae is anicteric.  Pupils are   reactive.  Nose is normal.  Mucous membrane is moist.  NECK:  No lymphadenopathy, no JVD, no thyroid mass.  CHEST:  Normal.  LUNGS:  Clear to auscultation.  HEART:  S1, S2.  ABDOMEN:  Soft, nontender, no hepatosplenomegaly.  There is no inguinal   lymphadenopathy.  Patient has a Rosa  catheter in place.  EXTREMITIES:  There is trace lower extremity edema.  SKIN:  There is mild erythema on the lower extremity.  NEUROLOGIC:  Patient is alert and oriented x3.  PSYCHIATRIC:  Mood, the patient is anxious.    LABORATORY DATA:  His recent labs were reviewed.  He also had a renal   ultrasound, which I reviewed the image myself, showed bilateral   hydronephrosis.    ASSESSMENT:  1.  Acute kidney injury, the etiology is most likely obstructive uropathy.  2.  Obstructive uropathy.  3.  Encephalopathy, most likely secondary to renal failure.  4.  Anemia.  5.  Metabolic acidosis.  6.  Volume overload.    PLAN:  1.  There is no acute need for renal replacement therapy.  2.  Check urine sodium, creatinine, as well as protein.  3.  IV fluid to replace his post-obstruction diuresis.  4.  Renal diet.  5.  Avoid nephrotoxins like NSAIDs.  6.  Daily labs.  7.  If there is no improvement in the creatinine in the next 24-48 hours, I   will consider dialysis.  8.  Prognosis is guarded.    Plan discussed in detail with Dr. Marshall.       ____________________________________     FADI NAJJAR, MD FN / VANESSA    DD:  10/25/2019 15:09:14  DT:  10/25/2019 15:27:10    D#:  [id number]  Job#:  [id number]

## 2019-10-25 NOTE — ED NOTES
US at bedside.Pt awaiting room assignment. Discussed POC with Pt. Pt verbalizes understanding. Pt denies any needs at this time. Pt's call light is within reach and bed is in low locked position.

## 2019-10-25 NOTE — CARE PLAN
Problem: Safety  Goal: Will remain free from falls  Outcome: PROGRESSING AS EXPECTED  Bed is locked and in lowest position. Call light and table within reach. Non-skid socks on. Bed alarm on.        Problem: Venous Thromboembolism (VTW)/Deep Vein Thrombosis (DVT) Prevention:  Goal: Patient will participate in Venous Thrombosis (VTE)/Deep Vein Thrombosis (DVT)Prevention Measures  Outcome: PROGRESSING AS EXPECTED   Pt is using SCDs at this time. MD DC'd heparin at this time due to blood present in urine.     Problem: Urinary Elimination:  Goal: Ability to reestablish a normal urinary elimination pattern will improve  Outcome: PROGRESSING SLOWER THAN EXPECTED   Pt is experiencing blood urine. Dr. Boogie paged and updated on pts status and Dr. Boogie ordered saline flushes at this time. If bleeding does not subside, CBI to be initiated. Dr. Boogie also okay with heparin being discontinued and SCD order being placed. All orders placed.

## 2019-10-25 NOTE — PROGRESS NOTES
"Hospital Medicine Daily Progress Note    Date of Service  10/25/2019    Chief Complaint  84 y.o. male admitted 10/24/2019 with ams, jennifer    Hospital Course    84 y.o. male who presented on 10/24/2019 after being sent by his primary care provider for abnormal MRI of the brain.  This is a pleasant gentleman who is alert and oriented at the time of my visit at bedside.  He was sent by his primary care provider for behavioral changes that began in the last month.  The patient states that he woke up one morning and was suddenly unable to \"put on my pants\".  No family members are at bedside to confirm this however the patient does state that he has been feeling more confused than usual, and has had difficulty completing his ADLs which has not been an issue for him in the past.  He denies any focal weaknesses, he has no problems finding the correct words to use or understanding conversations.  He said no headaches or vision changes.  However because of this, he was seen by his primary care provider and MRI was completed.  Additionally, the patient was also recently diagnosed with kidney disease but states that he is not been referred to nephrology and nor has any further work-up been done.  He denies any recent use of contrasted studies and denies any NSAID use.      Interval Problem Update  Seen and examined this morning, no family at bedside, patient is alert x3 however states he feels foggy in his head. otherwise no complaints, no abd pain, has a lockhart in place. Wants to eat his breakfast    Consultants/Specialty  Nephrology  urology    Code Status  full    Disposition  tbd    Review of Systems  Review of Systems   Constitutional: Negative for chills and fever.   HENT: Negative for congestion and sore throat.    Eyes: Negative for photophobia.   Respiratory: Negative for cough, shortness of breath and wheezing.    Cardiovascular: Negative for chest pain and palpitations.   Gastrointestinal: Negative for abdominal pain, " diarrhea, nausea and vomiting.   Genitourinary: Negative for dysuria.   Musculoskeletal: Negative for myalgias.   Skin: Negative for itching and rash.   Neurological: Positive for weakness. Negative for dizziness, tingling, focal weakness and headaches.        Confusion   Psychiatric/Behavioral: Positive for memory loss. Negative for depression and suicidal ideas.        Physical Exam  Temp:  [36 °C (96.8 °F)-36.4 °C (97.5 °F)] 36.4 °C (97.5 °F)  Pulse:  [58-98] 69  Resp:  [15-20] 16  BP: (118-146)/() 118/87  SpO2:  [94 %-100 %] 99 %    Physical Exam   Constitutional: He is oriented to person, place, and time. No distress.   HENT:   Head: Normocephalic and atraumatic.   Right Ear: External ear normal.   Left Ear: External ear normal.   Eyes: EOM are normal. Right eye exhibits no discharge. Left eye exhibits no discharge.   Neck: Neck supple. No JVD present.   Cardiovascular: Normal rate, regular rhythm and normal heart sounds.   Pulmonary/Chest: Effort normal and breath sounds normal. No respiratory distress. He exhibits no tenderness.   Abdominal: Soft. Bowel sounds are normal. He exhibits no distension. There is no tenderness.   Genitourinary:   Genitourinary Comments: lockhart   Musculoskeletal: He exhibits no edema.   Neurological: He is alert and oriented to person, place, and time. No cranial nerve deficit.   Skin: Skin is dry. He is not diaphoretic. No erythema.   Psychiatric: He has a normal mood and affect. His speech is delayed. He is slowed. Cognition and memory are impaired.   Nursing note and vitals reviewed.      Fluids    Intake/Output Summary (Last 24 hours) at 10/25/2019 1109  Last data filed at 10/25/2019 1015  Gross per 24 hour   Intake --   Output 54044 ml   Net -64563 ml       Laboratory  Recent Labs     10/24/19  2000 10/25/19  0243   WBC 6.2 5.4   RBC 4.04* 3.86*   HEMOGLOBIN 12.6* 12.2*   HEMATOCRIT 39.2* 37.7*   MCV 97.0 97.7   MCH 31.2 31.6   MCHC 32.1* 32.4*   RDW 53.3* 52.6*    PLATELETCT 215 187   MPV 10.1 10.4     Recent Labs     10/22/19  1320 10/24/19  2000 10/25/19  0243   SODIUM  --  146* 144   POTASSIUM  --  4.3 4.5   CHLORIDE  --  119* 117*   CO2  --  17* 18*   GLUCOSE  --  93 92   BUN  --  61* 65*   CREATININE 7.30* 5.97* 6.49*   CALCIUM  --  7.8* 9.2     Recent Labs     10/24/19  2000   APTT 32.8   INR 1.01               Imaging  US-RENAL   Final Result         1.  Moderate bilateral hydronephrosis.   2.  Large bladder volume, approximately 2.5 L.   3.  Left lower pole simple cyst      These findings were discussed with the patient's clinician, Emma Boogie, on 10/25/2019 12:40 AM.           Assessment/Plan  * JAIRON (acute kidney injury) (HCC)  Assessment & Plan  Appears to be postobstructive given urinary retention  nonanion gap MA  Rosa in place  Will start flomax  Nephrology consulted, no need for HD at this time  Continue ivf  I will also consult urology, likely prostate etiology  Monitor cr daily, this morning >6    Abnormal MRI  Assessment & Plan  Likely due to acute metabolic abnormalities given JAIRON  Neurology consulted on admission, will treat metabolic factor first and then reasses  neurochecks    HLD (hyperlipidemia)  Assessment & Plan  This is chronic, okay to continue home Zocor.    HTN (hypertension)  Assessment & Plan  Holding home losartan for now in light of his JAIRON, monitor blood pressures and add PRN IV hydralazine if needed    Hypothyroidism  Assessment & Plan  Patient does not remember his home dose of his levothyroxine, we will need to obtain updated information from his pharmacy in the morning and resume his home medications.       VTE prophylaxis: heparin

## 2019-10-25 NOTE — ED NOTES
Med Rec Updated and Complete per Pt at bedside and Historical with some RX bottles (returned).  Allergies Reviewed  No PO ABX last 14 days.

## 2019-10-25 NOTE — CONSULTS
[facility] Consult/H&P Note    Primary Service:   Attending: Elin Marshall M.D.  Patient's Name/MRN: Efra Soto, [id number]    Admit Date:10/24/2019  Today's Date: 10/25/2019   Length of stay:  LOS: 1 day   Room #: S523/01      Reason for consult/chief complaint: urinary retention, bilateral hydronephrosis, renal failure  ID/HPI: Efra Soto is a 84 y.o. male patient presented as recommended by his primary care for behavioral changes and abnormal brain MRI.  The patient does state that he has been feeling more confused than usual, and has had difficulty completing his ADLs which has not been an issue for him in the past. He was found to have about 2.5L in his bladder with moderate bilateral hydronephrosis on renal ultrasound. Rosa catheter was place. Urology was consulted.     He notes no prior issues with urination. Not on any prostate medications previously. Some weak stream reported. No straining. Unsure if he was emptying his bladder completely. He does not have a history of prostate cancer to his knowledge and prior screening reported to be normal. He had initial hematuria with catheter placement. Currently is taking flomax.      Past Medical History:   No past medical history on file.     Past Surgical History:   No past surgical history on file.     Family History:   No family history on file.      Social History:   Social History     Tobacco Use   • Smoking status: Former Smoker     Start date: 9/25/1982   • Smokeless tobacco: Former User     Types: Chew   Substance Use Topics   • Alcohol use: Not on file   • Drug use: Not on file      Social History     Social History Narrative   • Not on file        Allergies: he Patient has no known allergies.    Medications:   Medications Prior to Admission   Medication Sig Dispense Refill Last Dose   • omeprazole (PRILOSEC) 20 MG delayed-release capsule Take 40 mg by mouth every day. Indications: Gastroesophageal Reflux Disease   10/24/2019 at 0600   •  "fluocinonide (LIDEX) 0.05 % Cream Apply  to affected area(s) 2 times a day. Apply sparingly to both legs twice a day as needed for rash   10/24/2019 at 0600   • levothyroxine (SYNTHROID) 88 MCG Tab Take 88 mcg by mouth Every morning on an empty stomach.   10/24/2019 at 0600   • losartan (COZAAR) 100 MG Tab Take 100 mg by mouth every day.   10/24/2019 at 0600   • simvastatin (ZOCOR) 20 MG Tab Take 20 mg by mouth every evening.   10/23/2019 at 2100   • furosemide (LASIX) 20 MG Tab Take 20 mg by mouth every morning.   10/24/2019 at 0600   • allopurinol (ZYLOPRIM) 100 MG Tab Take 100 mg by mouth every day.   10/24/2019 at 0600   • Cholecalciferol (VITAMIN D3 PO) Take 1 Dose by mouth every day. Unknown OTC Strength.   10/24/2019 at 0600   • therapeutic multivitamin-minerals (THERAGRAN-M) Tab Take 1 Tab by mouth every day.   10/24/2019 at 0600         Review of Systems  Review of Systems   Constitutional: Negative for chills and fever.   Respiratory: Negative for shortness of breath.    Cardiovascular: Negative for chest pain.   Gastrointestinal: Negative for abdominal pain, nausea and vomiting.   Genitourinary: Positive for hematuria. Negative for flank pain, frequency and urgency.        Physical Exam  VITAL SIGNS: /87   Pulse 69   Temp 36.4 °C (97.5 °F) (Temporal)   Resp 16   Ht 1.778 m (5' 10\")   Wt 83.3 kg (183 lb 10.3 oz)   SpO2 99%   BMI 26.35 kg/m²   Physical Exam   Constitutional: He is oriented to person, place, and time and well-developed, well-nourished, and in no distress.   HENT:   Head: Normocephalic and atraumatic.   Eyes: Conjunctivae and EOM are normal.   Neck: Normal range of motion. Neck supple.   Cardiovascular: Normal rate and regular rhythm.   Pulmonary/Chest: Effort normal.   Abdominal: Soft.   Genitourinary:   Genitourinary Comments: Rosa in place with cherry red output, no clots   Musculoskeletal: Normal range of motion.   Neurological: He is alert and oriented to person, place, and " time.   Skin: Skin is warm and dry.   Psychiatric: Mood normal.         Labs:  Recent Labs     10/24/19  2000 10/25/19  0243   WBC 6.2 5.4   RBC 4.04* 3.86*   HEMOGLOBIN 12.6* 12.2*   HEMATOCRIT 39.2* 37.7*   MCV 97.0 97.7   MCH 31.2 31.6   MCHC 32.1* 32.4*   RDW 53.3* 52.6*   PLATELETCT 215 187   MPV 10.1 10.4     Recent Labs     10/22/19  1320 10/24/19  2000 10/25/19  0243   SODIUM  --  146* 144   POTASSIUM  --  4.3 4.5   CHLORIDE  --  119* 117*   CO2  --  17* 18*   GLUCOSE  --  93 92   BUN  --  61* 65*   CREATININE 7.30* 5.97* 6.49*   CALCIUM  --  7.8* 9.2     Recent Labs     10/24/19  2000   APTT 32.8   INR 1.01     Glucose:  Recent Labs     10/24/19  2000 10/25/19  0243   GLUCOSE 93 92     Coags:  Recent Labs     10/24/19  2000   INR 1.01         Urinalysis:   Recent Labs     10/24/19  2235   COLORURINE Yellow   CLARITY Clear   SPECGRAVITY 1.009   PHURINE 5.0   GLUCOSEUR Negative   KETONES Negative   NITRITE Negative   OCCULTBLOOD Negative   RBCURINE 0-2*   BACTERIA Negative   EPITHELCELL Negative       Imaging:  Renal Ultrasound 10/24/19   Impression         1.  Moderate bilateral hydronephrosis.  2.  Large bladder volume, approximately 2.5 L.  3.  Left lower pole simple cyst          Assessment/Recommendation   84 y.o.male with urinary retention, bilateral hydronephrosis, renal failure, hematuria    · Continue flomax, to go home on flomax  · Continue to monitor labs  · CBI to titrate to light pink to clear, low flow started. Hand irrigate prn clots  · Lockhart to remain and to be discharged with lockhart  · Patient will need outpatient workup for retention, [facility] arrange  · We will follow to recheck gross hematuria tomorrow    This case discussed with Dr. Ackerman and he is in agreement with this plan.        Steven Bennett, P.A.-C.   [id number] [place] [address]   [phone]

## 2019-10-25 NOTE — ASSESSMENT & PLAN NOTE
Appears to be postobstructive given urinary retention  nonanion gap MA- resolving  Lockhart in place- excellent output, reddish clear  continue flomax 0.4 daily  Nephrology consulted, no need for HD at this time  Continue ivf  Urology recs to continue lockhart as outpatient and have patient fu after dc  Kidney functions gradually improving.  Avoid nephrotoxins.  Renal dose all medications.  Kidney function is improving gradually.

## 2019-10-26 LAB
ANION GAP SERPL CALC-SCNC: 10 MMOL/L (ref 0–11.9)
BASOPHILS # BLD AUTO: 0.8 % (ref 0–1.8)
BASOPHILS # BLD: 0.04 K/UL (ref 0–0.12)
BUN SERPL-MCNC: 46 MG/DL (ref 8–22)
CALCIUM SERPL-MCNC: 9 MG/DL (ref 8.5–10.5)
CHLORIDE SERPL-SCNC: 117 MMOL/L (ref 96–112)
CO2 SERPL-SCNC: 18 MMOL/L (ref 20–33)
CREAT SERPL-MCNC: 3.65 MG/DL (ref 0.5–1.4)
EOSINOPHIL # BLD AUTO: 0.2 K/UL (ref 0–0.51)
EOSINOPHIL NFR BLD: 3.8 % (ref 0–6.9)
ERYTHROCYTE [DISTWIDTH] IN BLOOD BY AUTOMATED COUNT: 51.5 FL (ref 35.9–50)
GLUCOSE SERPL-MCNC: 88 MG/DL (ref 65–99)
HCT VFR BLD AUTO: 37.1 % (ref 42–52)
HGB BLD-MCNC: 12.1 G/DL (ref 14–18)
IMM GRANULOCYTES # BLD AUTO: 0.02 K/UL (ref 0–0.11)
IMM GRANULOCYTES NFR BLD AUTO: 0.4 % (ref 0–0.9)
LYMPHOCYTES # BLD AUTO: 0.79 K/UL (ref 1–4.8)
LYMPHOCYTES NFR BLD: 14.9 % (ref 22–41)
MCH RBC QN AUTO: 31.5 PG (ref 27–33)
MCHC RBC AUTO-ENTMCNC: 32.6 G/DL (ref 33.7–35.3)
MCV RBC AUTO: 96.6 FL (ref 81.4–97.8)
MONOCYTES # BLD AUTO: 0.53 K/UL (ref 0–0.85)
MONOCYTES NFR BLD AUTO: 10 % (ref 0–13.4)
NEUTROPHILS # BLD AUTO: 3.71 K/UL (ref 1.82–7.42)
NEUTROPHILS NFR BLD: 70.1 % (ref 44–72)
NRBC # BLD AUTO: 0 K/UL
NRBC BLD-RTO: 0 /100 WBC
PLATELET # BLD AUTO: 183 K/UL (ref 164–446)
PMV BLD AUTO: 10.1 FL (ref 9–12.9)
POTASSIUM SERPL-SCNC: 4.5 MMOL/L (ref 3.6–5.5)
RBC # BLD AUTO: 3.84 M/UL (ref 4.7–6.1)
SODIUM SERPL-SCNC: 145 MMOL/L (ref 135–145)
WBC # BLD AUTO: 5.3 K/UL (ref 4.8–10.8)

## 2019-10-26 PROCEDURE — 3E02340 INTRODUCTION OF INFLUENZA VACCINE INTO MUSCLE, PERCUTANEOUS APPROACH: ICD-10-PCS | Performed by: INTERNAL MEDICINE

## 2019-10-26 PROCEDURE — 99233 SBSQ HOSP IP/OBS HIGH 50: CPT | Performed by: INTERNAL MEDICINE

## 2019-10-26 PROCEDURE — 700105 HCHG RX REV CODE 258: Performed by: HOSPITALIST

## 2019-10-26 PROCEDURE — 80048 BASIC METABOLIC PNL TOTAL CA: CPT

## 2019-10-26 PROCEDURE — A9270 NON-COVERED ITEM OR SERVICE: HCPCS | Performed by: HOSPITALIST

## 2019-10-26 PROCEDURE — 99233 SBSQ HOSP IP/OBS HIGH 50: CPT | Performed by: HOSPITALIST

## 2019-10-26 PROCEDURE — 90471 IMMUNIZATION ADMIN: CPT

## 2019-10-26 PROCEDURE — 85025 COMPLETE CBC W/AUTO DIFF WBC: CPT

## 2019-10-26 PROCEDURE — 700102 HCHG RX REV CODE 250 W/ 637 OVERRIDE(OP): Performed by: HOSPITALIST

## 2019-10-26 PROCEDURE — 700111 HCHG RX REV CODE 636 W/ 250 OVERRIDE (IP): Performed by: HOSPITALIST

## 2019-10-26 PROCEDURE — 90662 IIV NO PRSV INCREASED AG IM: CPT | Performed by: HOSPITALIST

## 2019-10-26 PROCEDURE — 36415 COLL VENOUS BLD VENIPUNCTURE: CPT

## 2019-10-26 PROCEDURE — 770006 HCHG ROOM/CARE - MED/SURG/GYN SEMI*

## 2019-10-26 RX ADMIN — HEPARIN SODIUM 5000 UNITS: 5000 INJECTION, SOLUTION INTRAVENOUS; SUBCUTANEOUS at 05:00

## 2019-10-26 RX ADMIN — LEVOTHYROXINE SODIUM 88 MCG: 88 TABLET ORAL at 05:02

## 2019-10-26 RX ADMIN — HEPARIN SODIUM 5000 UNITS: 5000 INJECTION, SOLUTION INTRAVENOUS; SUBCUTANEOUS at 20:53

## 2019-10-26 RX ADMIN — HEPARIN SODIUM 5000 UNITS: 5000 INJECTION, SOLUTION INTRAVENOUS; SUBCUTANEOUS at 14:45

## 2019-10-26 RX ADMIN — SODIUM CHLORIDE, POTASSIUM CHLORIDE, SODIUM LACTATE AND CALCIUM CHLORIDE: 600; 310; 30; 20 INJECTION, SOLUTION INTRAVENOUS at 21:00

## 2019-10-26 RX ADMIN — SENNOSIDES AND DOCUSATE SODIUM 2 TABLET: 8.6; 5 TABLET ORAL at 05:01

## 2019-10-26 RX ADMIN — TAMSULOSIN HYDROCHLORIDE 0.4 MG: 0.4 CAPSULE ORAL at 10:23

## 2019-10-26 RX ADMIN — INFLUENZA A VIRUS A/MICHIGAN/45/2015 X-275 (H1N1) ANTIGEN (FORMALDEHYDE INACTIVATED), INFLUENZA A VIRUS A/SINGAPORE/INFIMH-16-0019/2016 IVR-186 (H3N2) ANTIGEN (FORMALDEHYDE INACTIVATED), AND INFLUENZA B VIRUS B/MARYLAND/15/2016 BX-69A (A B/COLORADO/6/2017-LIKE VIRUS) ANTIGEN (FORMALDEHYDE INACTIVATED) 0.5 ML: 60; 60; 60 INJECTION, SUSPENSION INTRAMUSCULAR at 10:24

## 2019-10-26 ASSESSMENT — ENCOUNTER SYMPTOMS
FEVER: 0
PHOTOPHOBIA: 0
TINGLING: 0
FOCAL WEAKNESS: 0
PALPITATIONS: 0
VOMITING: 0
WEAKNESS: 1
HEADACHES: 0
WHEEZING: 0
MEMORY LOSS: 1
DIZZINESS: 0
COUGH: 0
CHILLS: 0
FLANK PAIN: 0
DEPRESSION: 0
MYALGIAS: 0
NAUSEA: 0
ABDOMINAL PAIN: 0
SHORTNESS OF BREATH: 0
SORE THROAT: 0
DIARRHEA: 0

## 2019-10-26 NOTE — PROGRESS NOTES
"Hospital Medicine Daily Progress Note    Date of Service  10/26/2019    Chief Complaint  84 y.o. male admitted 10/24/2019 with ams, jennifer    Hospital Course    84 y.o. male who presented on 10/24/2019 after being sent by his primary care provider for abnormal MRI of the brain.  This is a pleasant gentleman who is alert and oriented at the time of my visit at bedside.  He was sent by his primary care provider for behavioral changes that began in the last month.  The patient states that he woke up one morning and was suddenly unable to \"put on my pants\".  No family members are at bedside to confirm this however the patient does state that he has been feeling more confused than usual, and has had difficulty completing his ADLs which has not been an issue for him in the past.  He denies any focal weaknesses, he has no problems finding the correct words to use or understanding conversations.  He said no headaches or vision changes.  However because of this, he was seen by his primary care provider and MRI was completed.  Additionally, the patient was also recently diagnosed with kidney disease but states that he is not been referred to nephrology and nor has any further work-up been done.  He denies any recent use of contrasted studies and denies any NSAID use.      Interval Problem Update  Seen and examined this morning, no family at bedside, patient is alert x3 however states he feels foggy in his head. otherwise no complaints, no abd pain, has a lockhart in place. Wants to eat his breakfast      10/26: seen and examined this morning, doing ok , laying in bed, sleeping but arousable to verbal stimuli. Patient has no complaints this morning, no abd pain  Lockhart draining yellow clear urine.     Consultants/Specialty  Nephrology  urology    Code Status  full    Disposition  tbd    Review of Systems  Review of Systems   Constitutional: Negative for chills and fever.   HENT: Negative for congestion and sore throat.    Eyes: " Negative for photophobia.   Respiratory: Negative for cough, shortness of breath and wheezing.    Cardiovascular: Negative for chest pain and palpitations.   Gastrointestinal: Negative for abdominal pain, diarrhea, nausea and vomiting.   Genitourinary: Negative for dysuria.   Musculoskeletal: Negative for myalgias.   Skin: Negative for itching and rash.   Neurological: Positive for weakness. Negative for dizziness, tingling, focal weakness and headaches.        Confusion   Psychiatric/Behavioral: Positive for memory loss. Negative for depression and suicidal ideas.        Physical Exam  Temp:  [36.5 °C (97.7 °F)-36.9 °C (98.4 °F)] 36.8 °C (98.2 °F)  Pulse:  [69-89] 85  Resp:  [16-20] 16  BP: (131-144)/(88-93) 131/88  SpO2:  [94 %-99 %] 99 %    Physical Exam   Constitutional: He is oriented to person, place, and time. No distress.   HENT:   Head: Normocephalic and atraumatic.   Right Ear: External ear normal.   Left Ear: External ear normal.   Eyes: EOM are normal. Right eye exhibits no discharge. Left eye exhibits no discharge.   Neck: Neck supple. No JVD present.   Cardiovascular: Normal rate, regular rhythm and normal heart sounds.   Pulmonary/Chest: Effort normal and breath sounds normal. No respiratory distress. He exhibits no tenderness.   Abdominal: Soft. Bowel sounds are normal. He exhibits no distension. There is no tenderness.   Genitourinary:   Genitourinary Comments: lockhart   Musculoskeletal: He exhibits no edema.   Neurological: He is alert and oriented to person, place, and time. No cranial nerve deficit.   Skin: Skin is dry. He is not diaphoretic. No erythema.   Psychiatric: He has a normal mood and affect. His speech is delayed. He is slowed. Cognition and memory are impaired.   Nursing note and vitals reviewed.      Fluids    Intake/Output Summary (Last 24 hours) at 10/26/2019 1031  Last data filed at 10/26/2019 0700  Gross per 24 hour   Intake 100 ml   Output 700 ml   Net -600 ml        Laboratory  Recent Labs     10/24/19  2000 10/25/19  0243 10/26/19  0835   WBC 6.2 5.4 5.3   RBC 4.04* 3.86* 3.84*   HEMOGLOBIN 12.6* 12.2* 12.1*   HEMATOCRIT 39.2* 37.7* 37.1*   MCV 97.0 97.7 96.6   MCH 31.2 31.6 31.5   MCHC 32.1* 32.4* 32.6*   RDW 53.3* 52.6* 51.5*   PLATELETCT 215 187 183   MPV 10.1 10.4 10.1     Recent Labs     10/24/19  2000 10/25/19  0243 10/26/19  0835   SODIUM 146* 144 145   POTASSIUM 4.3 4.5 4.5   CHLORIDE 119* 117* 117*   CO2 17* 18* 18*   GLUCOSE 93 92 88   BUN 61* 65* 46*   CREATININE 5.97* 6.49* 3.65*   CALCIUM 7.8* 9.2 9.0     Recent Labs     10/24/19  2000   APTT 32.8   INR 1.01               Imaging  US-RENAL   Final Result         1.  Moderate bilateral hydronephrosis.   2.  Large bladder volume, approximately 2.5 L.   3.  Left lower pole simple cyst      These findings were discussed with the patient's clinician, Emma Boogie, on 10/25/2019 12:40 AM.           Assessment/Plan  * JAIRON (acute kidney injury) (HCC)  Assessment & Plan  Appears to be postobstructive given urinary retention  nonanion gap MA- resolving  Lockhart in place- excellent output, yellow clear  continue flomax  Nephrology consulted, no need for HD at this time  Creatinine improve from 6---> 3 this morning  Continue ivf  Urology recs to continue lockhart as outpatient and have patient fu after dc      Abnormal MRI  Assessment & Plan  Likely due to acute metabolic abnormalities given JAIRON  Neurology consulted on admission, will treat metabolic factor first and then reasses  neurochecks    HLD (hyperlipidemia)  Assessment & Plan  This is chronic, okay to continue home Zocor.    HTN (hypertension)  Assessment & Plan  Holding home losartan for now in light of his JAIRON, monitor blood pressures and add PRN IV hydralazine if needed    Hypothyroidism  Assessment & Plan  Resume home synthyroid       VTE prophylaxis: heparin

## 2019-10-26 NOTE — CARE PLAN
Problem: Communication  Goal: The ability to communicate needs accurately and effectively will improve  Outcome: PROGRESSING AS EXPECTED   Pt is able to express all concerns verbally.    Problem: Safety  Goal: Will remain free from falls  Outcome: PROGRESSING AS EXPECTED   Bed is locked and in lowest position. Call light and table within reach. Non-skid socks on. Bed alarm on.

## 2019-10-26 NOTE — PROGRESS NOTES
Nephrology Daily Progress Note    Date of Service  10/26/2019    Chief Complaint  84 y.o. male admitted 10/24/2019 with encephalopathy, JAIRON, obstructive uropathy    Interval Problem Update  Pt feels tired, good UO    Review of Systems  Review of Systems   Constitutional: Negative for chills, fever and malaise/fatigue.   Respiratory: Negative for cough and shortness of breath.    Cardiovascular: Negative for chest pain and leg swelling.   Gastrointestinal: Negative for nausea and vomiting.   Genitourinary: Negative for dysuria, frequency and urgency.        Physical Exam  Temp:  [36.5 °C (97.7 °F)-36.9 °C (98.4 °F)] 36.8 °C (98.2 °F)  Pulse:  [69-97] 97  Resp:  [16-20] 16  BP: (131-144)/(88-93) 134/93  SpO2:  [94 %-99 %] 99 %    Physical Exam   Constitutional: He is oriented to person, place, and time. He appears cachectic. He appears ill. No distress.   HENT:   Right Ear: External ear normal.   Left Ear: External ear normal.   Nose: Nose normal.   Mouth/Throat: No oropharyngeal exudate.   Eyes: Conjunctivae are normal. Right eye exhibits no discharge. Left eye exhibits no discharge. No scleral icterus.   Neck: No JVD present. No tracheal deviation present. No thyromegaly present.   Cardiovascular: Normal rate, regular rhythm and normal heart sounds.   No murmur heard.  Pulmonary/Chest: No respiratory distress. He has no wheezes. He has no rales.   Coarse BS   Abdominal: Bowel sounds are normal. He exhibits no distension. There is no tenderness. There is no rebound.   Musculoskeletal: He exhibits no edema, tenderness or deformity.   Neurological: He is alert and oriented to person, place, and time. No cranial nerve deficit.   Skin: Skin is warm and dry. He is not diaphoretic. No erythema.   Psychiatric: He has a normal mood and affect. His behavior is normal.   Nursing note and vitals reviewed.      Fluids    Intake/Output Summary (Last 24 hours) at 10/26/2019 1350  Last data filed at 10/26/2019 0700  Gross per 24  hour   Intake 100 ml   Output 0 ml   Net 100 ml       Laboratory  Recent Labs     10/24/19  2000 10/25/19  0243 10/26/19  0835   WBC 6.2 5.4 5.3   RBC 4.04* 3.86* 3.84*   HEMOGLOBIN 12.6* 12.2* 12.1*   HEMATOCRIT 39.2* 37.7* 37.1*   MCV 97.0 97.7 96.6   MCH 31.2 31.6 31.5   MCHC 32.1* 32.4* 32.6*   RDW 53.3* 52.6* 51.5*   PLATELETCT 215 187 183   MPV 10.1 10.4 10.1     Recent Labs     10/24/19  2000 10/25/19  0243 10/26/19  0835   SODIUM 146* 144 145   POTASSIUM 4.3 4.5 4.5   CHLORIDE 119* 117* 117*   CO2 17* 18* 18*   GLUCOSE 93 92 88   BUN 61* 65* 46*   CREATININE 5.97* 6.49* 3.65*   CALCIUM 7.8* 9.2 9.0     Recent Labs     10/24/19  2000   APTT 32.8   INR 1.01     Recent Labs     10/24/19  2000   NTPROBNP 949*           Imaging  US-RENAL   Final Result         1.  Moderate bilateral hydronephrosis.   2.  Large bladder volume, approximately 2.5 L.   3.  Left lower pole simple cyst      These findings were discussed with the patient's clinician, Emma Boogie, on 10/25/2019 12:40 AM.            Assessment/Plan  1 JAIRON on CKD III: sec to obstructive uroipathy, cr is still elevated  2 encephalopathy : most likely sec to JAIRON  3 Hematuria   4 Hypoalbuminemia  5 metabolic acidosis  Plan  no need for HD  Renal diet  Daily labs  Renal dose all meds  Avoid nephrotoxins  Prognosis guarded.  D/W Dr Marshall

## 2019-10-26 NOTE — PROGRESS NOTES
Bedside report received from Cristina HERNANDEZ . Assumed care of pt at 1845 . Pt is awaje at this time with no signs of distress. Plan of care discussed with pt. Pt is A&O x 4. Pt is on 0 L of oxygen. Bed alarm is on, bed in lowest position, bed rails up x 2, belongings and call light within reach. Hourly rounding in place.

## 2019-10-26 NOTE — PROGRESS NOTES
Note to reader: this note follows the APSO format rather than the historical SOAP format. Assessment and plan located at the top of the note for ease of use.    Chief Complaint  84 y.o. year old male here with renal failure, hydro and hematuria following lockhart placement.    Assessment/Plan  Interval History   Urinary retention  JAIRON  Bilateral hydronephrosis  Gross hematuria, resolved  Anemia    Can plug CBI port on 3 way lockhart as CBI no longer needed. Resume CBI however if hematuria returns.  Follow labs with plan to repeat MIKE when Creat reaches [place]  Continue Flomax  Keep lockhart  Plan for outpatient voiding trial    Case discussed with patient and Urology, Dr. Wilbert HEDRICK  Patient seen and examined    10/26. Urine has cleared and CBI is off. Creat improved. H/H stable. Denies pains.          Review of Systems  Physical Exam   Review of Systems   Constitutional: Negative for fever.   Genitourinary: Negative for flank pain and hematuria.     Vitals:    10/25/19 1920 10/26/19 0332 10/26/19 0730 10/26/19 1000   BP: 144/93 141/91 131/88 134/93   Pulse: 89 85 85 97   Resp: 18 16 16    Temp: 36.9 °C (98.4 °F) 36.6 °C (97.8 °F) 36.8 °C (98.2 °F)    TempSrc: Temporal Temporal Temporal    SpO2: 98% 98% 99%    Weight:       Height:         Physical Exam   Constitutional: He is oriented to person, place, and time and well-developed, well-nourished, and in no distress. No distress.   Eyes: Conjunctivae are normal. Left eye exhibits no discharge.   Neck: Normal range of motion. Neck supple.   Pulmonary/Chest: Effort normal.   Abdominal: Soft. Bowel sounds are normal. He exhibits no distension. There is no tenderness. There is no rebound and no guarding.   Genitourinary:   Genitourinary Comments: Lockhart in place draining sariah urine   Neurological: He is alert and oriented to person, place, and time.   Skin: Skin is warm and dry.   Psychiatric: Affect and judgment normal.   Nursing note and vitals reviewed.       Hematology  Chemistry   Lab Results   Component Value Date/Time    WBC 5.3 10/26/2019 08:35 AM    HEMOGLOBIN 12.1 (L) 10/26/2019 08:35 AM    HEMATOCRIT 37.1 (L) 10/26/2019 08:35 AM    PLATELETCT 183 10/26/2019 08:35 AM     Lab Results   Component Value Date/Time    SODIUM 145 10/26/2019 08:35 AM    POTASSIUM 4.5 10/26/2019 08:35 AM    CHLORIDE 117 (H) 10/26/2019 08:35 AM    CO2 18 (L) 10/26/2019 08:35 AM    GLUCOSE 88 10/26/2019 08:35 AM    BUN 46 (H) 10/26/2019 08:35 AM    CREATININE 3.65 (H) 10/26/2019 08:35 AM         Labs not explicitly included in this progress note were reviewed by the author.   Radiology/imaging not explicitly included in this progress note was reviewed by the author.     Medications reviewed, Labs reviewed and Radiology images reviewed

## 2019-10-27 PROBLEM — R31.0 GROSS HEMATURIA: Status: ACTIVE | Noted: 2019-10-27

## 2019-10-27 LAB
ANION GAP SERPL CALC-SCNC: 8 MMOL/L (ref 0–11.9)
BASOPHILS # BLD AUTO: 1 % (ref 0–1.8)
BASOPHILS # BLD: 0.06 K/UL (ref 0–0.12)
BUN SERPL-MCNC: 36 MG/DL (ref 8–22)
CALCIUM SERPL-MCNC: 8.8 MG/DL (ref 8.5–10.5)
CHLORIDE SERPL-SCNC: 116 MMOL/L (ref 96–112)
CO2 SERPL-SCNC: 22 MMOL/L (ref 20–33)
CREAT SERPL-MCNC: 2.69 MG/DL (ref 0.5–1.4)
EOSINOPHIL # BLD AUTO: 0.22 K/UL (ref 0–0.51)
EOSINOPHIL NFR BLD: 3.8 % (ref 0–6.9)
ERYTHROCYTE [DISTWIDTH] IN BLOOD BY AUTOMATED COUNT: 51.7 FL (ref 35.9–50)
GLUCOSE SERPL-MCNC: 81 MG/DL (ref 65–99)
HCT VFR BLD AUTO: 35.2 % (ref 42–52)
HGB BLD-MCNC: 11.6 G/DL (ref 14–18)
IMM GRANULOCYTES # BLD AUTO: 0.02 K/UL (ref 0–0.11)
IMM GRANULOCYTES NFR BLD AUTO: 0.3 % (ref 0–0.9)
LYMPHOCYTES # BLD AUTO: 1.02 K/UL (ref 1–4.8)
LYMPHOCYTES NFR BLD: 17.6 % (ref 22–41)
MCH RBC QN AUTO: 31.4 PG (ref 27–33)
MCHC RBC AUTO-ENTMCNC: 33 G/DL (ref 33.7–35.3)
MCV RBC AUTO: 95.1 FL (ref 81.4–97.8)
MONOCYTES # BLD AUTO: 0.54 K/UL (ref 0–0.85)
MONOCYTES NFR BLD AUTO: 9.3 % (ref 0–13.4)
NEUTROPHILS # BLD AUTO: 3.93 K/UL (ref 1.82–7.42)
NEUTROPHILS NFR BLD: 68 % (ref 44–72)
NRBC # BLD AUTO: 0 K/UL
NRBC BLD-RTO: 0 /100 WBC
PLATELET # BLD AUTO: 185 K/UL (ref 164–446)
PMV BLD AUTO: 10.1 FL (ref 9–12.9)
POTASSIUM SERPL-SCNC: 4.4 MMOL/L (ref 3.6–5.5)
RBC # BLD AUTO: 3.7 M/UL (ref 4.7–6.1)
SODIUM SERPL-SCNC: 146 MMOL/L (ref 135–145)
WBC # BLD AUTO: 5.8 K/UL (ref 4.8–10.8)

## 2019-10-27 PROCEDURE — 99232 SBSQ HOSP IP/OBS MODERATE 35: CPT | Performed by: INTERNAL MEDICINE

## 2019-10-27 PROCEDURE — 80048 BASIC METABOLIC PNL TOTAL CA: CPT

## 2019-10-27 PROCEDURE — 51798 US URINE CAPACITY MEASURE: CPT

## 2019-10-27 PROCEDURE — A9270 NON-COVERED ITEM OR SERVICE: HCPCS | Performed by: HOSPITALIST

## 2019-10-27 PROCEDURE — 770006 HCHG ROOM/CARE - MED/SURG/GYN SEMI*

## 2019-10-27 PROCEDURE — 85025 COMPLETE CBC W/AUTO DIFF WBC: CPT

## 2019-10-27 PROCEDURE — 700102 HCHG RX REV CODE 250 W/ 637 OVERRIDE(OP): Performed by: HOSPITALIST

## 2019-10-27 PROCEDURE — 36415 COLL VENOUS BLD VENIPUNCTURE: CPT

## 2019-10-27 PROCEDURE — 99232 SBSQ HOSP IP/OBS MODERATE 35: CPT | Performed by: FAMILY MEDICINE

## 2019-10-27 PROCEDURE — 700111 HCHG RX REV CODE 636 W/ 250 OVERRIDE (IP): Performed by: HOSPITALIST

## 2019-10-27 RX ADMIN — TAMSULOSIN HYDROCHLORIDE 0.4 MG: 0.4 CAPSULE ORAL at 09:47

## 2019-10-27 RX ADMIN — LEVOTHYROXINE SODIUM 88 MCG: 88 TABLET ORAL at 04:53

## 2019-10-27 RX ADMIN — HEPARIN SODIUM 5000 UNITS: 5000 INJECTION, SOLUTION INTRAVENOUS; SUBCUTANEOUS at 04:53

## 2019-10-27 ASSESSMENT — ENCOUNTER SYMPTOMS
FOCAL WEAKNESS: 0
WEAKNESS: 1
DIARRHEA: 0
FEVER: 0
SORE THROAT: 0
CHILLS: 0
DIZZINESS: 0
PHOTOPHOBIA: 0
MYALGIAS: 0
WHEEZING: 0
ORTHOPNEA: 0
COUGH: 0
NAUSEA: 0
DEPRESSION: 0
ABDOMINAL PAIN: 0
VOMITING: 0
PALPITATIONS: 0
HEADACHES: 0
TINGLING: 0
MEMORY LOSS: 1
SHORTNESS OF BREATH: 0

## 2019-10-27 NOTE — CARE PLAN
Problem: Communication  Goal: The ability to communicate needs accurately and effectively will improve  Outcome: PROGRESSING AS EXPECTED     Problem: Safety  Goal: Will remain free from injury  Outcome: PROGRESSING AS EXPECTED     Problem: Venous Thromboembolism (VTW)/Deep Vein Thrombosis (DVT) Prevention:  Goal: Patient will participate in Venous Thrombosis (VTE)/Deep Vein Thrombosis (DVT)Prevention Measures  Outcome: PROGRESSING AS EXPECTED

## 2019-10-27 NOTE — CARE PLAN
Problem: Discharge Barriers/Planning  Goal: Patient's continuum of care needs will be met  Intervention: Assess potential discharge barriers on admission and throughout hospital stay  Note:   Per MD note, pt to d/c with lockhart for retention.      Problem: Urinary Elimination:  Goal: Ability to reestablish a normal urinary elimination pattern will improve  Intervention: Assess and monitor for signs and symptoms of urinary retention  Note:   Lockhart draining to gravity. Output is sariah/tea colored urine.

## 2019-10-27 NOTE — PROGRESS NOTES
Note to reader: this note follows the APSO format rather than the historical SOAP format. Assessment and plan located at the top of the note for ease of use.    Chief Complaint  84 y.o. year old male here with renal failure, hydro and hematuria following lockhart placement.    Assessment/Plan  Interval History   Urinary retention  JAIRON  Bilateral hydronephrosis  Gross hematuria, resolved  Anemia      Continue Flomax  Keep lockhart  Plan for outpatient voiding trial in 2 weeks with MIKE and BMP prior     Case discussed with patient and Urology, Dr. Wilbert HEDRICK  Patient seen and examined    10/27. CBI off. No clot obstruction. Denies bladder pains. Creat improving.    10/26. Urine has cleared and CBI is off. Creat improved. H/H stable. Denies pains.          Review of Systems  Physical Exam   Review of Systems   Constitutional: Negative for fever.   Genitourinary: Negative for urgency.     Vitals:    10/26/19 1630 10/26/19 1915 10/27/19 0355 10/27/19 0705   BP: 132/65 144/89 140/84 133/75   Pulse: 92 99 83 76   Resp: 17 17 16 16   Temp: 36.7 °C (98 °F) 36.9 °C (98.4 °F) 36.1 °C (97 °F) 36.4 °C (97.5 °F)   TempSrc: Temporal Temporal Temporal Temporal   SpO2: 97% 98% 93% 96%   Weight:  80.3 kg (177 lb 0.5 oz)     Height:         Physical Exam   Constitutional: He is oriented to person, place, and time and well-developed, well-nourished, and in no distress. No distress.   Eyes: Conjunctivae are normal. Left eye exhibits no discharge.   Neck: Normal range of motion. Neck supple.   Pulmonary/Chest: Effort normal.   Abdominal: Soft. Bowel sounds are normal. He exhibits no distension. There is no tenderness. There is no rebound and no guarding.   Genitourinary:   Genitourinary Comments: Lockhart in place draining light pink urine   Musculoskeletal: He exhibits no edema.   Neurological: He is alert and oriented to person, place, and time.   Skin: Skin is warm and dry.   Psychiatric: Affect and judgment normal.   Nursing note and  vitals reviewed.       Hematology Chemistry   Lab Results   Component Value Date/Time    WBC 5.8 10/27/2019 12:44 AM    HEMOGLOBIN 11.6 (L) 10/27/2019 12:44 AM    HEMATOCRIT 35.2 (L) 10/27/2019 12:44 AM    PLATELETCT 185 10/27/2019 12:44 AM     Lab Results   Component Value Date/Time    SODIUM 146 (H) 10/27/2019 09:05 AM    POTASSIUM 4.4 10/27/2019 09:05 AM    CHLORIDE 116 (H) 10/27/2019 09:05 AM    CO2 22 10/27/2019 09:05 AM    GLUCOSE 81 10/27/2019 09:05 AM    BUN 36 (H) 10/27/2019 09:05 AM    CREATININE 2.69 (H) 10/27/2019 09:05 AM         Labs not explicitly included in this progress note were reviewed by the author.   Radiology/imaging not explicitly included in this progress note was reviewed by the author.     Medications reviewed and Labs reviewed

## 2019-10-27 NOTE — PROGRESS NOTES
Rec'd report from day shift RN. Assumed pt care. Assessment completed. AA&Ox4. Denies pain at this time. No s/s of discomfort or distress. Q2 hour turns enforced. Rosa draining to gravity, sariah tea colored urine present. Heels floated on pillows. SCDs on. Bed in lowest position, bed locked, bed alarm on for safety, RN and CNA numbers provided, call light within reach.

## 2019-10-27 NOTE — PROGRESS NOTES
Nephrology Daily Progress Note    Date of Service  10/27/2019    Chief Complaint  84 y.o. male admitted 10/24/2019 with encephalopathy, JAIRON, obstructive uropathy    Interval Problem Update  Pt is doing better, no new complaints    Review of Systems  Review of Systems   Constitutional: Negative for chills, fever and malaise/fatigue.   Respiratory: Negative for cough and shortness of breath.    Cardiovascular: Negative for chest pain and leg swelling.   Gastrointestinal: Negative for nausea and vomiting.   Genitourinary: Negative for dysuria, frequency and urgency.        Physical Exam  Temp:  [36.1 °C (97 °F)-36.9 °C (98.4 °F)] 36.4 °C (97.5 °F)  Pulse:  [76-99] 76  Resp:  [16-17] 16  BP: (132-144)/(65-89) 133/75  SpO2:  [93 %-98 %] 96 %    Physical Exam   Constitutional: He is oriented to person, place, and time. No distress.   HENT:   Mouth/Throat: No oropharyngeal exudate.   Eyes: No scleral icterus.   Cardiovascular: Normal rate and regular rhythm.   No murmur heard.  Pulmonary/Chest: Effort normal and breath sounds normal. No respiratory distress. He has no wheezes.   Musculoskeletal: He exhibits no edema or deformity.   Neurological: He is alert and oriented to person, place, and time.   Skin: Skin is warm. He is not diaphoretic. No erythema.   Psychiatric: He has a normal mood and affect. His behavior is normal.   Nursing note and vitals reviewed.      Fluids    Intake/Output Summary (Last 24 hours) at 10/27/2019 1408  Last data filed at 10/27/2019 1100  Gross per 24 hour   Intake 1440 ml   Output 900 ml   Net 540 ml       Laboratory  Recent Labs     10/25/19  0243 10/26/19  0835 10/27/19  0044   WBC 5.4 5.3 5.8   RBC 3.86* 3.84* 3.70*   HEMOGLOBIN 12.2* 12.1* 11.6*   HEMATOCRIT 37.7* 37.1* 35.2*   MCV 97.7 96.6 95.1   MCH 31.6 31.5 31.4   MCHC 32.4* 32.6* 33.0*   RDW 52.6* 51.5* 51.7*   PLATELETCT 187 183 185   MPV 10.4 10.1 10.1     Recent Labs     10/25/19  0243 10/26/19  0835 10/27/19  0905   SODIUM 144  145 146*   POTASSIUM 4.5 4.5 4.4   CHLORIDE 117* 117* 116*   CO2 18* 18* 22   GLUCOSE 92 88 81   BUN 65* 46* 36*   CREATININE 6.49* 3.65* 2.69*   CALCIUM 9.2 9.0 8.8     Recent Labs     10/24/19  2000   APTT 32.8   INR 1.01     Recent Labs     10/24/19  2000   NTPROBNP 949*           Imaging  US-RENAL   Final Result         1.  Moderate bilateral hydronephrosis.   2.  Large bladder volume, approximately 2.5 L.   3.  Left lower pole simple cyst      These findings were discussed with the patient's clinician, Emma Boogie, on 10/25/2019 12:40 AM.            Assessment/Plan  1 JAIRON on CKD III:improving  2 encephalopathy : better  3 Hematuria   4 Hypoalbuminemia  5 metabolic acidosis  6 urinary retention: appreciate urology input  Plan  no need for HD  Renal diet  Daily labs  Renal dose all meds  Avoid nephrotoxins  Prognosis guarded.

## 2019-10-27 NOTE — ASSESSMENT & PLAN NOTE
Likely traumatic after placing Rosa catheter.  Continue with Rosa catheter  Monitor H&H which has been stable  He was off CBI and on 10/28: Still having pink-colored urine with a few clots noted. Urology reinstituted CBI.  Discontinue CBI and monitoring him for 24-hour before discharge  Urology sign off  Needs to keep the Rosa and and follow-up with urology clinic as outpatient in 2 weeks for voiding trial.  Continue Flomax

## 2019-10-27 NOTE — PROGRESS NOTES
CBI stopped 10/26 at 1400.    Urine noted to be bloody in tubing and bag again this morning. Dr. Burris at bedside and aware.     Heparin order discontinued.

## 2019-10-27 NOTE — PROGRESS NOTES
"Hospital Medicine Daily Progress Note    Date of Service  10/27/2019    Chief Complaint  84 y.o. male admitted 10/24/2019 with ams, jennifer    Hospital Course    84 y.o. male who presented on 10/24/2019 after being sent by his primary care provider for abnormal MRI of the brain.  This is a pleasant gentleman who is alert and oriented at the time of my visit at bedside.  He was sent by his primary care provider for behavioral changes that began in the last month.  The patient states that he woke up one morning and was suddenly unable to \"put on my pants\".  No family members are at bedside to confirm this however the patient does state that he has been feeling more confused than usual, and has had difficulty completing his ADLs which has not been an issue for him in the past.  He denies any focal weaknesses, he has no problems finding the correct words to use or understanding conversations.  He said no headaches or vision changes.  However because of this, he was seen by his primary care provider and MRI was completed.  Additionally, the patient was also recently diagnosed with kidney disease but states that he is not been referred to nephrology and nor has any further work-up been done.  He denies any recent use of contrasted studies and denies any NSAID use.      Interval Problem Update  Seen and examined this morning, no family at bedside, patient is alert x3 however states he feels foggy in his head. otherwise no complaints, no abd pain, has a lockhart in place. Wants to eat his breakfast      10/26: seen and examined this morning, doing ok , laying in bed, sleeping but arousable to verbal stimuli. Patient has no complaints this morning, no abd pain  Lockhart draining yellow clear urine.   10/27: Resting comfortably in bed.  More pinkish urine noted today in the Lockhart tubing and bag.  Denies any chest pain or shortness of breath.  No acute distress noted.  Alert and oriented.  Interactive.  Answers simple questions " appropriately.  Consultants/Specialty  Nephrology  urology    Code Status  full    Disposition  tbd  Pending PT/OT eval    Review of Systems  Review of Systems   Constitutional: Negative for chills and fever.   HENT: Negative for congestion and sore throat.    Eyes: Negative for photophobia.   Respiratory: Negative for cough, shortness of breath and wheezing.    Cardiovascular: Negative for chest pain, palpitations and orthopnea.   Gastrointestinal: Negative for abdominal pain, diarrhea, nausea and vomiting.   Genitourinary: Positive for hematuria. Negative for dysuria and urgency.   Musculoskeletal: Negative for myalgias.   Skin: Negative for itching and rash.   Neurological: Positive for weakness. Negative for dizziness, tingling, focal weakness and headaches.        Confusion   Psychiatric/Behavioral: Positive for memory loss. Negative for depression and suicidal ideas.        Physical Exam  Temp:  [36.1 °C (97 °F)-36.9 °C (98.4 °F)] 36.4 °C (97.5 °F)  Pulse:  [76-99] 76  Resp:  [16-17] 16  BP: (132-144)/(65-89) 133/75  SpO2:  [93 %-98 %] 96 %    Physical Exam   Constitutional: He is oriented to person, place, and time. No distress.   HENT:   Head: Normocephalic and atraumatic.   Eyes: EOM are normal. Right eye exhibits no discharge. Left eye exhibits no discharge.   Neck: Neck supple. No JVD present. No tracheal deviation present.   Cardiovascular: Normal rate, regular rhythm and normal heart sounds.   Pulmonary/Chest: Effort normal and breath sounds normal. No respiratory distress.   Abdominal: Soft. Bowel sounds are normal. He exhibits no distension. There is no tenderness.   Genitourinary:   Genitourinary Comments: lockhart catheter in place with pinkish urine noted in the tubing and the Lockhart bag   Musculoskeletal: He exhibits no edema.   Neurological: He is alert and oriented to person, place, and time. No cranial nerve deficit.   Skin: Skin is dry. No rash noted. He is not diaphoretic. No erythema.    Psychiatric: He has a normal mood and affect. His speech is delayed. He is slowed. Cognition and memory are impaired.   Nursing note and vitals reviewed.      Fluids    Intake/Output Summary (Last 24 hours) at 10/27/2019 1312  Last data filed at 10/27/2019 1100  Gross per 24 hour   Intake 1440 ml   Output 900 ml   Net 540 ml       Laboratory  Recent Labs     10/25/19  0243 10/26/19  0835 10/27/19  0044   WBC 5.4 5.3 5.8   RBC 3.86* 3.84* 3.70*   HEMOGLOBIN 12.2* 12.1* 11.6*   HEMATOCRIT 37.7* 37.1* 35.2*   MCV 97.7 96.6 95.1   MCH 31.6 31.5 31.4   MCHC 32.4* 32.6* 33.0*   RDW 52.6* 51.5* 51.7*   PLATELETCT 187 183 185   MPV 10.4 10.1 10.1     Recent Labs     10/25/19  0243 10/26/19  0835 10/27/19  0905   SODIUM 144 145 146*   POTASSIUM 4.5 4.5 4.4   CHLORIDE 117* 117* 116*   CO2 18* 18* 22   GLUCOSE 92 88 81   BUN 65* 46* 36*   CREATININE 6.49* 3.65* 2.69*   CALCIUM 9.2 9.0 8.8     Recent Labs     10/24/19  2000   APTT 32.8   INR 1.01               Imaging  US-RENAL   Final Result         1.  Moderate bilateral hydronephrosis.   2.  Large bladder volume, approximately 2.5 L.   3.  Left lower pole simple cyst      These findings were discussed with the patient's clinician, Emma Boogie, on 10/25/2019 12:40 AM.           Assessment/Plan  * JAIRON (acute kidney injury) (HCC)  Assessment & Plan  Appears to be postobstructive given urinary retention  nonanion gap MA- resolving  Lockhart in place- excellent output, yellow clear  continue flomax  Nephrology consulted, no need for HD at this time  Continue ivf  Urology recs to continue lockhart as outpatient and have patient fu after dc  Kidney functions gradually improving.  Avoid nephrotoxins.  Renal dose all medications.      Abnormal MRI  Assessment & Plan  Likely due to acute metabolic abnormalities such as uremia in the light of JAIRON  Neurology consulted on admission, will treat metabolic factor first and then reasses  Mental status gradually improving.    Gross hematuria-  (present on admission)  Assessment & Plan  Likely traumatic after placing Rosa catheter.  Off CBI  Discontinue heparin.  Continue with Rosa catheter  Monitor H&H  Urology following    HLD (hyperlipidemia)  Assessment & Plan  This is chronic, okay to continue home Zocor.    HTN (hypertension)  Assessment & Plan  Holding home losartan for now in light of his JAIRON, monitor blood pressures and add PRN IV hydralazine if needed    Hypothyroidism  Assessment & Plan  Resume home synthyroid  Plan of care discussed with multidisciplinary team during rounds.    VTE prophylaxis: SCDs.  No anticoagulation for now due to hematuria.

## 2019-10-28 LAB
ALBUMIN SERPL BCP-MCNC: 2.9 G/DL (ref 3.2–4.9)
ALBUMIN/GLOB SERPL: 1.1 G/DL
ALP SERPL-CCNC: 51 U/L (ref 30–99)
ALT SERPL-CCNC: 25 U/L (ref 2–50)
ANION GAP SERPL CALC-SCNC: 7 MMOL/L (ref 0–11.9)
ANION GAP SERPL CALC-SCNC: 9 MMOL/L (ref 0–11.9)
AST SERPL-CCNC: 30 U/L (ref 12–45)
BASOPHILS # BLD AUTO: 0.7 % (ref 0–1.8)
BASOPHILS # BLD: 0.04 K/UL (ref 0–0.12)
BILIRUB SERPL-MCNC: 0.4 MG/DL (ref 0.1–1.5)
BUN SERPL-MCNC: 24 MG/DL (ref 8–22)
BUN SERPL-MCNC: 28 MG/DL (ref 8–22)
CALCIUM SERPL-MCNC: 9.1 MG/DL (ref 8.5–10.5)
CALCIUM SERPL-MCNC: 9.2 MG/DL (ref 8.5–10.5)
CHLORIDE SERPL-SCNC: 111 MMOL/L (ref 96–112)
CHLORIDE SERPL-SCNC: 113 MMOL/L (ref 96–112)
CO2 SERPL-SCNC: 24 MMOL/L (ref 20–33)
CO2 SERPL-SCNC: 24 MMOL/L (ref 20–33)
CREAT SERPL-MCNC: 1.93 MG/DL (ref 0.5–1.4)
CREAT SERPL-MCNC: 2.18 MG/DL (ref 0.5–1.4)
EOSINOPHIL # BLD AUTO: 0.24 K/UL (ref 0–0.51)
EOSINOPHIL NFR BLD: 3.9 % (ref 0–6.9)
ERYTHROCYTE [DISTWIDTH] IN BLOOD BY AUTOMATED COUNT: 51.5 FL (ref 35.9–50)
GLOBULIN SER CALC-MCNC: 2.6 G/DL (ref 1.9–3.5)
GLUCOSE SERPL-MCNC: 86 MG/DL (ref 65–99)
GLUCOSE SERPL-MCNC: 89 MG/DL (ref 65–99)
HCT VFR BLD AUTO: 37.7 % (ref 42–52)
HGB BLD-MCNC: 12.3 G/DL (ref 14–18)
IMM GRANULOCYTES # BLD AUTO: 0.02 K/UL (ref 0–0.11)
IMM GRANULOCYTES NFR BLD AUTO: 0.3 % (ref 0–0.9)
LYMPHOCYTES # BLD AUTO: 1.02 K/UL (ref 1–4.8)
LYMPHOCYTES NFR BLD: 16.7 % (ref 22–41)
MCH RBC QN AUTO: 31.6 PG (ref 27–33)
MCHC RBC AUTO-ENTMCNC: 32.6 G/DL (ref 33.7–35.3)
MCV RBC AUTO: 96.9 FL (ref 81.4–97.8)
MONOCYTES # BLD AUTO: 0.53 K/UL (ref 0–0.85)
MONOCYTES NFR BLD AUTO: 8.7 % (ref 0–13.4)
NEUTROPHILS # BLD AUTO: 4.27 K/UL (ref 1.82–7.42)
NEUTROPHILS NFR BLD: 69.7 % (ref 44–72)
NRBC # BLD AUTO: 0 K/UL
NRBC BLD-RTO: 0 /100 WBC
PLATELET # BLD AUTO: 182 K/UL (ref 164–446)
PMV BLD AUTO: 10.1 FL (ref 9–12.9)
POTASSIUM SERPL-SCNC: 4.1 MMOL/L (ref 3.6–5.5)
POTASSIUM SERPL-SCNC: 4.1 MMOL/L (ref 3.6–5.5)
PROT SERPL-MCNC: 5.5 G/DL (ref 6–8.2)
RBC # BLD AUTO: 3.89 M/UL (ref 4.7–6.1)
SODIUM SERPL-SCNC: 144 MMOL/L (ref 135–145)
SODIUM SERPL-SCNC: 144 MMOL/L (ref 135–145)
WBC # BLD AUTO: 6.1 K/UL (ref 4.8–10.8)

## 2019-10-28 PROCEDURE — 770006 HCHG ROOM/CARE - MED/SURG/GYN SEMI*

## 2019-10-28 PROCEDURE — A9270 NON-COVERED ITEM OR SERVICE: HCPCS | Performed by: HOSPITALIST

## 2019-10-28 PROCEDURE — 80053 COMPREHEN METABOLIC PANEL: CPT

## 2019-10-28 PROCEDURE — 700105 HCHG RX REV CODE 258: Performed by: HOSPITALIST

## 2019-10-28 PROCEDURE — 99232 SBSQ HOSP IP/OBS MODERATE 35: CPT | Performed by: INTERNAL MEDICINE

## 2019-10-28 PROCEDURE — 36415 COLL VENOUS BLD VENIPUNCTURE: CPT

## 2019-10-28 PROCEDURE — 80048 BASIC METABOLIC PNL TOTAL CA: CPT

## 2019-10-28 PROCEDURE — 97165 OT EVAL LOW COMPLEX 30 MIN: CPT

## 2019-10-28 PROCEDURE — 99232 SBSQ HOSP IP/OBS MODERATE 35: CPT | Performed by: FAMILY MEDICINE

## 2019-10-28 PROCEDURE — 700102 HCHG RX REV CODE 250 W/ 637 OVERRIDE(OP): Performed by: HOSPITALIST

## 2019-10-28 PROCEDURE — 97162 PT EVAL MOD COMPLEX 30 MIN: CPT

## 2019-10-28 PROCEDURE — 85025 COMPLETE CBC W/AUTO DIFF WBC: CPT

## 2019-10-28 RX ADMIN — SODIUM CHLORIDE, POTASSIUM CHLORIDE, SODIUM LACTATE AND CALCIUM CHLORIDE: 600; 310; 30; 20 INJECTION, SOLUTION INTRAVENOUS at 03:30

## 2019-10-28 RX ADMIN — LEVOTHYROXINE SODIUM 88 MCG: 88 TABLET ORAL at 05:49

## 2019-10-28 RX ADMIN — TAMSULOSIN HYDROCHLORIDE 0.4 MG: 0.4 CAPSULE ORAL at 09:25

## 2019-10-28 ASSESSMENT — ENCOUNTER SYMPTOMS
COUGH: 0
ORTHOPNEA: 0
DIARRHEA: 0
FEVER: 0
DEPRESSION: 0
NECK PAIN: 0
WHEEZING: 0
CHILLS: 0
PALPITATIONS: 0
MEMORY LOSS: 1
ABDOMINAL PAIN: 0
MYALGIAS: 0
SORE THROAT: 0
WEIGHT LOSS: 0
HEADACHES: 0
FOCAL WEAKNESS: 0
DIZZINESS: 0
WEAKNESS: 1
PHOTOPHOBIA: 0
SHORTNESS OF BREATH: 0
VOMITING: 0
TINGLING: 0
NAUSEA: 0

## 2019-10-28 ASSESSMENT — COGNITIVE AND FUNCTIONAL STATUS - GENERAL
MOVING FROM LYING ON BACK TO SITTING ON SIDE OF FLAT BED: A LITTLE
SUGGESTED CMS G CODE MODIFIER DAILY ACTIVITY: CK
EATING MEALS: A LITTLE
MOBILITY SCORE: 19
STANDING UP FROM CHAIR USING ARMS: A LITTLE
SUGGESTED CMS G CODE MODIFIER MOBILITY: CK
DRESSING REGULAR UPPER BODY CLOTHING: A LITTLE
WALKING IN HOSPITAL ROOM: A LITTLE
DAILY ACTIVITIY SCORE: 18
HELP NEEDED FOR BATHING: A LITTLE
TOILETING: A LITTLE
CLIMB 3 TO 5 STEPS WITH RAILING: A LOT
DRESSING REGULAR LOWER BODY CLOTHING: A LITTLE
PERSONAL GROOMING: A LITTLE

## 2019-10-28 ASSESSMENT — GAIT ASSESSMENTS
GAIT LEVEL OF ASSIST: MINIMAL ASSIST
DISTANCE (FEET): 200
ASSISTIVE DEVICE: FRONT WHEEL WALKER
DEVIATION: DECREASED BASE OF SUPPORT

## 2019-10-28 ASSESSMENT — ACTIVITIES OF DAILY LIVING (ADL): TOILETING: INDEPENDENT

## 2019-10-28 ASSESSMENT — LIFESTYLE VARIABLES: SUBSTANCE_ABUSE: 0

## 2019-10-28 NOTE — PROGRESS NOTES
Note to reader: this note follows the APSO format rather than the historical SOAP format. Assessment and plan located at the top of the note for ease of use.    Chief Complaint  84 y.o. year old male here with renal failure, hydro and hematuria following lockhart placement.    Assessment/Plan  Interval History   Urinary retention  JAIRON  Bilateral hydronephrosis  Gross hematuria  Anemia      Continue Flomax  Keep lockhart  Re-start CBI, hand irrigate PRN for clots  Plan for outpatient voiding trial in 2 weeks with MIKE and BMP prior     Case discussed with patient and Urology, Dr. Wilbert HEDRICK  Patient seen and examined    10/28. CBI off. Urine now cherry red with some clots. Denies pain. Cr improving, now 1.93. H&H stable. Nursing note reports hand irrigation x2 last night.      10/27. CBI off. No clot obstruction. Denies bladder pains. Creat improving.    10/26. Urine has cleared and CBI is off. Creat improved. H/H stable. Denies pains.          Review of Systems  Physical Exam   Review of Systems   Constitutional: Negative for fever.   Genitourinary: Positive for hematuria. Negative for urgency.     Vitals:    10/27/19 1600 10/27/19 2000 10/28/19 0445 10/28/19 0725   BP: 129/82 132/78 147/99 136/90   Pulse: 82 98 71 67   Resp: 18 17 17 17   Temp: 36.7 °C (98.1 °F) 37.1 °C (98.8 °F) 36.8 °C (98.2 °F) 37.2 °C (99 °F)   TempSrc: Temporal Temporal Temporal Temporal   SpO2: 96% 97% 97% 98%   Weight:       Height:         Physical Exam   Constitutional: He is oriented to person, place, and time and well-developed, well-nourished, and in no distress. No distress.   HENT:   Head: Normocephalic and atraumatic.   Eyes: Conjunctivae are normal. Left eye exhibits no discharge.   Neck: Normal range of motion. Neck supple.   Pulmonary/Chest: Effort normal.   Abdominal: Soft.   Genitourinary:   Genitourinary Comments: Lockhart in place draining cherry red urine with some clots   Musculoskeletal: He exhibits no edema.   Neurological: He is  alert and oriented to person, place, and time.   Skin: Skin is warm and dry.   Psychiatric: Affect and judgment normal.   Nursing note and vitals reviewed.       Hematology Chemistry   Lab Results   Component Value Date/Time    WBC 6.1 10/28/2019 12:57 AM    HEMOGLOBIN 12.3 (L) 10/28/2019 12:57 AM    HEMATOCRIT 37.7 (L) 10/28/2019 12:57 AM    PLATELETCT 182 10/28/2019 12:57 AM     Lab Results   Component Value Date/Time    SODIUM 144 10/28/2019 09:16 AM    POTASSIUM 4.1 10/28/2019 09:16 AM    CHLORIDE 111 10/28/2019 09:16 AM    CO2 24 10/28/2019 09:16 AM    GLUCOSE 86 10/28/2019 09:16 AM    BUN 24 (H) 10/28/2019 09:16 AM    CREATININE 1.93 (H) 10/28/2019 09:16 AM         Labs not explicitly included in this progress note were reviewed by the author.   Radiology/imaging not explicitly included in this progress note was reviewed by the author.     Core Measures

## 2019-10-28 NOTE — PROGRESS NOTES
Nephrology Daily Progress Note    Date of Service  10/28/2019    Chief Complaint  84 y.o. male admitted 10/24/2019 with encephalopathy, JAIRON, obstructive uropathy    Interval Problem Update  Cr improving, Cr down to 1.93    Review of Systems  Review of Systems   Constitutional: Negative for chills and fever.   All other systems reviewed and are negative.       Physical Exam  Temp:  [36.7 °C (98.1 °F)-37.2 °C (99 °F)] 37.2 °C (99 °F)  Pulse:  [67-98] 67  Resp:  [17-18] 17  BP: (129-147)/(78-99) 136/90  SpO2:  [96 %-98 %] 98 %    Physical Exam   Constitutional: He is oriented to person, place, and time. No distress.   HENT:   Mouth/Throat: No oropharyngeal exudate.   Eyes: No scleral icterus.   Cardiovascular: Normal rate and regular rhythm.   No murmur heard.  Pulmonary/Chest: Effort normal and breath sounds normal. No respiratory distress. He has no wheezes.   Musculoskeletal: He exhibits no edema or deformity.   Neurological: He is alert and oriented to person, place, and time.   Skin: Skin is warm. He is not diaphoretic. No erythema.   Psychiatric: He has a normal mood and affect. His behavior is normal.   Nursing note and vitals reviewed.      Fluids    Intake/Output Summary (Last 24 hours) at 10/28/2019 1343  Last data filed at 10/28/2019 0900  Gross per 24 hour   Intake 2040 ml   Output 700 ml   Net 1340 ml       Laboratory  Recent Labs     10/26/19  0835 10/27/19  0044 10/28/19  0057   WBC 5.3 5.8 6.1   RBC 3.84* 3.70* 3.89*   HEMOGLOBIN 12.1* 11.6* 12.3*   HEMATOCRIT 37.1* 35.2* 37.7*   MCV 96.6 95.1 96.9   MCH 31.5 31.4 31.6   MCHC 32.6* 33.0* 32.6*   RDW 51.5* 51.7* 51.5*   PLATELETCT 183 185 182   MPV 10.1 10.1 10.1     Recent Labs     10/27/19  0905 10/28/19  0057 10/28/19  0916   SODIUM 146* 144 144   POTASSIUM 4.4 4.1 4.1   CHLORIDE 116* 113* 111   CO2 22 24 24   GLUCOSE 81 89 86   BUN 36* 28* 24*   CREATININE 2.69* 2.18* 1.93*   CALCIUM 8.8 9.1 9.2         No results for input(s): NTPROBNP in the last  72 hours.        Imaging  US-RENAL   Final Result         1.  Moderate bilateral hydronephrosis.   2.  Large bladder volume, approximately 2.5 L.   3.  Left lower pole simple cyst      These findings were discussed with the patient's clinician, Emma Boogie, on 10/25/2019 12:40 AM.            Assessment/Plan  1 JAIRON on CKD III - Improving, from obstruction, urology following  2 encephalopathy : better  3 Hematuria   4 Hypoalbuminemia  5 metabolic acidosis  6 urinary retention, lockhart    -Renal function improved  -Will not need HD at this time  -Will likely have some degree of CKD which will need follow up  -Will sign off in hospital, he can follow up with nephrology as an outpatient once urology issues resolved

## 2019-10-28 NOTE — PROGRESS NOTES
"Rec'd report from day shift RN. Assumed pt care. Assessment completed. AA&Ox4. Denies pain at this time. No s/s of discomfort or distress. Q2 hour turns enforced. Lockhart draining to gravity, \"fruit punch\" colored urine in lockhart bag. Heels floated on pillows. SCDs on. Dependent BLE edema notes. Bed in lowest position, bed locked, bed alarm on for safety, RN and CNA numbers provided, call light within reach.   "

## 2019-10-28 NOTE — PROGRESS NOTES
"Hospital Medicine Daily Progress Note    Date of Service  10/28/2019    Chief Complaint  84 y.o. male admitted 10/24/2019 with ams, jennifer    Hospital Course    84 y.o. male who presented on 10/24/2019 after being sent by his primary care provider for abnormal MRI of the brain.  This is a pleasant gentleman who is alert and oriented at the time of my visit at bedside.  He was sent by his primary care provider for behavioral changes that began in the last month.  The patient states that he woke up one morning and was suddenly unable to \"put on my pants\".  No family members are at bedside to confirm this however the patient does state that he has been feeling more confused than usual, and has had difficulty completing his ADLs which has not been an issue for him in the past.  He denies any focal weaknesses, he has no problems finding the correct words to use or understanding conversations.  He said no headaches or vision changes.  However because of this, he was seen by his primary care provider and MRI was completed.  Additionally, the patient was also recently diagnosed with kidney disease but states that he is not been referred to nephrology and nor has any further work-up been done.  He denies any recent use of contrasted studies and denies any NSAID use.      Interval Problem Update  Seen and examined this morning, no family at bedside, patient is alert x3 however states he feels foggy in his head. otherwise no complaints, no abd pain, has a lockhart in place. Wants to eat his breakfast      10/26: seen and examined this morning, doing ok , laying in bed, sleeping but arousable to verbal stimuli. Patient has no complaints this morning, no abd pain  Lockhart draining yellow clear urine.   10/27: Resting comfortably in bed.  More pinkish urine noted today in the Lockhart tubing and bag.  Denies any chest pain or shortness of breath.  No acute distress noted.  Alert and oriented.  Interactive.  Answers simple questions " appropriately.  10/28: Continue to have pink like color of the urine with few clots noted.  Urology reinstituted CBI.  Hemodynamically stable.  Start p.o. well.  Kidney functions continues to improve.  No distress noted.  No issues overnight per staff.  Consultants/Specialty  Nephrology  urology    Code Status  full    Disposition  tbd  Pending PT/OT eval    Review of Systems  Review of Systems   Constitutional: Negative for chills, fever and weight loss.   HENT: Negative for congestion and sore throat.    Eyes: Negative for photophobia.   Respiratory: Negative for cough, shortness of breath and wheezing.    Cardiovascular: Negative for chest pain, palpitations and orthopnea.   Gastrointestinal: Negative for abdominal pain, diarrhea, nausea and vomiting.   Genitourinary: Positive for hematuria. Negative for dysuria and urgency.   Musculoskeletal: Negative for myalgias and neck pain.   Skin: Negative for itching and rash.   Neurological: Positive for weakness. Negative for dizziness, tingling, focal weakness and headaches.        Confusion   Psychiatric/Behavioral: Positive for memory loss. Negative for depression, substance abuse and suicidal ideas.        Physical Exam  Temp:  [36.8 °C (98.2 °F)-37.2 °C (99 °F)] 37.2 °C (99 °F)  Pulse:  [67-98] 67  Resp:  [17] 17  BP: (132-147)/(78-99) 136/90  SpO2:  [97 %-98 %] 98 %    Physical Exam   Constitutional: He is oriented to person, place, and time. No distress.   HENT:   Head: Normocephalic and atraumatic.   Eyes: EOM are normal. Right eye exhibits no discharge. Left eye exhibits no discharge.   Neck: Neck supple. No JVD present. No tracheal deviation present.   Cardiovascular: Normal rate, regular rhythm and normal heart sounds.   Pulmonary/Chest: Effort normal and breath sounds normal. No respiratory distress.   Abdominal: Soft. Bowel sounds are normal. He exhibits no distension.   Genitourinary:   Genitourinary Comments: lockhart catheter in place with pinkish urine  noted in the tubing and the Lockhart bag   Musculoskeletal: He exhibits no edema.   Neurological: He is alert and oriented to person, place, and time. No cranial nerve deficit.   Skin: Skin is warm and dry. No rash noted. He is not diaphoretic. No erythema.   Psychiatric: He has a normal mood and affect. His speech is delayed. He is slowed. Cognition and memory are impaired.   Nursing note and vitals reviewed.      Fluids    Intake/Output Summary (Last 24 hours) at 10/28/2019 1633  Last data filed at 10/28/2019 1400  Gross per 24 hour   Intake 2040 ml   Output 275 ml   Net 1765 ml       Laboratory  Recent Labs     10/26/19  0835 10/27/19  0044 10/28/19  0057   WBC 5.3 5.8 6.1   RBC 3.84* 3.70* 3.89*   HEMOGLOBIN 12.1* 11.6* 12.3*   HEMATOCRIT 37.1* 35.2* 37.7*   MCV 96.6 95.1 96.9   MCH 31.5 31.4 31.6   MCHC 32.6* 33.0* 32.6*   RDW 51.5* 51.7* 51.5*   PLATELETCT 183 185 182   MPV 10.1 10.1 10.1     Recent Labs     10/27/19  0905 10/28/19  0057 10/28/19  0916   SODIUM 146* 144 144   POTASSIUM 4.4 4.1 4.1   CHLORIDE 116* 113* 111   CO2 22 24 24   GLUCOSE 81 89 86   BUN 36* 28* 24*   CREATININE 2.69* 2.18* 1.93*   CALCIUM 8.8 9.1 9.2                   Imaging  US-RENAL   Final Result         1.  Moderate bilateral hydronephrosis.   2.  Large bladder volume, approximately 2.5 L.   3.  Left lower pole simple cyst      These findings were discussed with the patient's clinician, Emma Boogie, on 10/25/2019 12:40 AM.           Assessment/Plan  * JAIRON (acute kidney injury) (HCC)  Assessment & Plan  Appears to be postobstructive given urinary retention  nonanion gap MA- resolving  Lockhart in place- excellent output, yellow clear  continue flomax  Nephrology consulted, no need for HD at this time  Continue ivf  Urology recs to continue lockhart as outpatient and have patient fu after dc  Kidney functions gradually improving.  Avoid nephrotoxins.  Renal dose all medications.  Kidney function is improving gradually.      Abnormal  MRI  Assessment & Plan  Likely due to acute metabolic abnormalities such as uremia in the light of JAIRON  Neurology consulted on admission, will treat metabolic factor first and then reasses  Mental status gradually improving.    Gross hematuria- (present on admission)  Assessment & Plan  Likely traumatic after placing Rosa catheter.  Off CBI  Discontinue heparin.  Continue with Rosa catheter  Monitor H&H  Urology following  10/28: Still having pink-colored urine with a few clots noted..  H&H has been stable.  Urology reinstituted CBI.    HLD (hyperlipidemia)  Assessment & Plan  This is chronic, okay to continue home Zocor.    HTN (hypertension)  Assessment & Plan  Holding home losartan for now in light of his JAIRON, monitor blood pressures and add PRN IV hydralazine if needed    Hypothyroidism  Assessment & Plan  Resume home synthyroid  Plan of care discussed with multidisciplinary team during rounds.    VTE prophylaxis: SCDs.  No anticoagulation for now due to hematuria.

## 2019-10-28 NOTE — PROGRESS NOTES
Assumed care at 0700 after report received from night RN. Pt A/Ox4 (off on time occasionally, with memory issues). Pt complaining of blurred vision and poor peripheral vision, but per chart this has been ongoing since admit. Pt lives at home with wife who has memory issues, so he may not be able to return to home and require 24HR supervision pending PT/OT eval. PIV flushed and site WNL. 3-way lockhart present, CBI stopped yesterday and does not need restarted per Urology. Urine is bloody and required flushing x2 overnight d/t clots. No complaints of pain from pt.

## 2019-10-28 NOTE — CARE PLAN
Problem: Urinary Elimination:  Goal: Ability to reestablish a normal urinary elimination pattern will improve  Intervention: Assess and monitor for signs and symptoms of urinary retention  Note:   Red urine with clots output in lockhart bag. Flushed lockhart as noted decreased output in lockhart bag, clots continue to empty into lockhart bag.      Problem: Skin Integrity  Goal: Risk for impaired skin integrity will decrease  Note:   Encouraged q2 hour turns as pt minimally changes position.

## 2019-10-28 NOTE — CARE PLAN
Problem: Safety  Goal: Will remain free from falls  Outcome: PROGRESSING AS EXPECTED  Intervention: Assess risk factors for falls  Flowsheets  Taken 10/28/2019 0900 by Deborah Ferrer, C.N.A.  Pt Calls for Assistance: Yes  Taken 10/28/2019 1120 by Carolyn Gao R.N.  Fall Risk: Risk to Fall -  0 - 1 point  History of fall: 0  Mobility Status Assessment: 1-1 Healthcare Provider Required for Assistance with Ambulation & Transfer  Risk for Injury-Any positive answers results in the pt being at high risk for fall related injury: Not Applicable  Note:   All fall precautions in place at this time, safety education provided to pt      Problem: Pain Management  Goal: Pain level will decrease to patient's comfort goal  Outcome: PROGRESSING AS EXPECTED

## 2019-10-28 NOTE — THERAPY
"Occupational Therapy Evaluation completed.   Functional Status: OT eval completed on 85 YO M admitted with AMS. Pt demonstrated WFL for BUE AROM/strength/coordination.  Pt demonstrated WFL for visual acuity however reported blurriness upon first awakening. Pt with difficulty with peripheral vision with screening and during functional tasks requiring VC's throughout to visually scan. Pt with poor problem solving and sequencing throughout tx session. Pt required VC's to maintain FWW in front of him during func mobs within room as pt holding onto FWW with one hand however attempting to walk away from it. During oral care while standing at sink, pt using toothbrush to brush side of toothpaste bottle. Pt unable to get toothpaste onto toothbrush with VCs, requiring tactile cues to coordinate. Pt required min A for sit>Stand (for safety), mod A for LB dressing, min A for toilet transfer, SPV for toileting, min A for grooming while standing and min A for chair transfer. Pt reports he lives with SO who has memory deficits he has to provide assist with however pt was unable to recall details of home when asked about PLOF. Pt will require 24/7 SPV if to return home. Pt may benefit from inpatient transitional care stay prior to d/c home.   Plan of Care: Will benefit from Occupational Therapy 3 times per week  Discharge Recommendations:  Equipment: Will Continue to Assess for Equipment Needs. Recommend post-acute placement for additional occupational therapy services prior to discharge home. Patient can tolerate post-acute therapies at a 5x/week frequency.    See \"Rehab Therapy-Acute\" Patient Summary Report for complete documentation.    "

## 2019-10-28 NOTE — THERAPY
"Physical Therapy Evaluation completed.   Bed Mobility: Stand By Assist    Transfers: Minimal Assist    Gait: Minimal Assist with Front-Wheel Walker       Plan of Care: Will benefit from Physical Therapy 3 times per week  Discharge Recommendations: Equipment: Will Continue to Assess for Equipment Needs. Post-acute therapy Discharge to a transitional care facility for continued skilled therapy services.    Pt admitted for JAIRON workup and presents most limited by subjective report of impaired vision (\"things are fuzzy\") and instability in standing/gait. Pt required assist for management of FWW as well as for balance during gait x200'. At times, pt required cues for task such as bringing R UE onto walker handle and maintaining it during mobility. Formally, pt with slightly impaired coordination to UE, but suspect this is due to visual impairment. While here, PT will follow to address instability, gait deviations, fall risk, and activity tolerance. Recommend placement.      See \"Rehab Therapy-Acute\" Patient Summary Report for complete documentation.     "

## 2019-10-29 LAB
ANION GAP SERPL CALC-SCNC: 8 MMOL/L (ref 0–11.9)
BASOPHILS # BLD AUTO: 0.5 % (ref 0–1.8)
BASOPHILS # BLD: 0.03 K/UL (ref 0–0.12)
BUN SERPL-MCNC: 26 MG/DL (ref 8–22)
CALCIUM SERPL-MCNC: 8.8 MG/DL (ref 8.5–10.5)
CHLORIDE SERPL-SCNC: 110 MMOL/L (ref 96–112)
CO2 SERPL-SCNC: 24 MMOL/L (ref 20–33)
CREAT SERPL-MCNC: 1.82 MG/DL (ref 0.5–1.4)
EOSINOPHIL # BLD AUTO: 0.27 K/UL (ref 0–0.51)
EOSINOPHIL NFR BLD: 4.1 % (ref 0–6.9)
ERYTHROCYTE [DISTWIDTH] IN BLOOD BY AUTOMATED COUNT: 51.4 FL (ref 35.9–50)
GLUCOSE SERPL-MCNC: 106 MG/DL (ref 65–99)
HCT VFR BLD AUTO: 34.5 % (ref 42–52)
HGB BLD-MCNC: 11.4 G/DL (ref 14–18)
IMM GRANULOCYTES # BLD AUTO: 0.03 K/UL (ref 0–0.11)
IMM GRANULOCYTES NFR BLD AUTO: 0.5 % (ref 0–0.9)
LYMPHOCYTES # BLD AUTO: 1.15 K/UL (ref 1–4.8)
LYMPHOCYTES NFR BLD: 17.6 % (ref 22–41)
MCH RBC QN AUTO: 31.8 PG (ref 27–33)
MCHC RBC AUTO-ENTMCNC: 33 G/DL (ref 33.7–35.3)
MCV RBC AUTO: 96.1 FL (ref 81.4–97.8)
MONOCYTES # BLD AUTO: 0.58 K/UL (ref 0–0.85)
MONOCYTES NFR BLD AUTO: 8.9 % (ref 0–13.4)
NEUTROPHILS # BLD AUTO: 4.47 K/UL (ref 1.82–7.42)
NEUTROPHILS NFR BLD: 68.4 % (ref 44–72)
NRBC # BLD AUTO: 0 K/UL
NRBC BLD-RTO: 0 /100 WBC
PLATELET # BLD AUTO: 164 K/UL (ref 164–446)
PMV BLD AUTO: 10.3 FL (ref 9–12.9)
POTASSIUM SERPL-SCNC: 3.9 MMOL/L (ref 3.6–5.5)
RBC # BLD AUTO: 3.59 M/UL (ref 4.7–6.1)
SODIUM SERPL-SCNC: 142 MMOL/L (ref 135–145)
WBC # BLD AUTO: 6.5 K/UL (ref 4.8–10.8)

## 2019-10-29 PROCEDURE — 80048 BASIC METABOLIC PNL TOTAL CA: CPT

## 2019-10-29 PROCEDURE — 700102 HCHG RX REV CODE 250 W/ 637 OVERRIDE(OP): Performed by: HOSPITALIST

## 2019-10-29 PROCEDURE — 770006 HCHG ROOM/CARE - MED/SURG/GYN SEMI*

## 2019-10-29 PROCEDURE — A9270 NON-COVERED ITEM OR SERVICE: HCPCS | Performed by: HOSPITALIST

## 2019-10-29 PROCEDURE — 99232 SBSQ HOSP IP/OBS MODERATE 35: CPT | Performed by: INTERNAL MEDICINE

## 2019-10-29 PROCEDURE — 700105 HCHG RX REV CODE 258: Performed by: HOSPITALIST

## 2019-10-29 PROCEDURE — 36415 COLL VENOUS BLD VENIPUNCTURE: CPT

## 2019-10-29 PROCEDURE — 85025 COMPLETE CBC W/AUTO DIFF WBC: CPT

## 2019-10-29 RX ADMIN — LEVOTHYROXINE SODIUM 88 MCG: 88 TABLET ORAL at 04:40

## 2019-10-29 RX ADMIN — SODIUM CHLORIDE, POTASSIUM CHLORIDE, SODIUM LACTATE AND CALCIUM CHLORIDE: 600; 310; 30; 20 INJECTION, SOLUTION INTRAVENOUS at 04:40

## 2019-10-29 RX ADMIN — TAMSULOSIN HYDROCHLORIDE 0.4 MG: 0.4 CAPSULE ORAL at 09:31

## 2019-10-29 ASSESSMENT — ENCOUNTER SYMPTOMS
PALPITATIONS: 0
FEVER: 0
DEPRESSION: 0
HEADACHES: 0
NECK PAIN: 0
COUGH: 0
VOMITING: 0
SORE THROAT: 0
ORTHOPNEA: 0
SHORTNESS OF BREATH: 0
FOCAL WEAKNESS: 0
WEAKNESS: 1
NAUSEA: 0
DIZZINESS: 0
WHEEZING: 0
ABDOMINAL PAIN: 0
MEMORY LOSS: 1
CHILLS: 0
PHOTOPHOBIA: 0
MYALGIAS: 0
TINGLING: 0
DIARRHEA: 0
WEIGHT LOSS: 0

## 2019-10-29 ASSESSMENT — LIFESTYLE VARIABLES: SUBSTANCE_ABUSE: 0

## 2019-10-29 NOTE — PROGRESS NOTES
Assumed care at 0700 after report received from night RN. Pt A/Ox4 (off on time occasionally, with memory issues). Pt complaining of blurred vision and poor peripheral vision, but per chart this has been ongoing since admit. PIV flushed and site WNL. 3-way lockhart present, CBI running with urine a light pink color consistently overnight. No complaints of pain from pt.

## 2019-10-29 NOTE — DISCHARGE PLANNING
Care Transition Team Assessment      Met at bedside with pt to discuss dc planning. PT/OT recommendations that pt may require skilled rehab. Pt agreeable. Will review in rounds, not yet med cleared as CBI continuing. Pt states wife is at home with assist from his niece.   Information Source  Orientation : Oriented x 4  Information Given By: Patient  Informant's Name: (Efra Soto)  Who is responsible for making decisions for patient? : Patient    Readmission Evaluation  Is this a readmission?: No    Elopement Risk  Legal Hold: No  Ambulatory or Self Mobile in Wheelchair: Yes  Disoriented: No  Psychiatric Symptoms: None  History of Wandering: No  Elopement this Admit: No  Vocalizing Wanting to Leave: No  Displays Behaviors, Body Language Wanting to Leave: No-Not at Risk for Elopement  Elopement Risk: Not at Risk for Elopement    Interdisciplinary Discharge Planning  Does Admitting Nurse Feel This Could be a Complex Discharge?: Yes  Primary Care Physician: Rosendo)  Lives with - Patient's Self Care Capacity: Spouse  Patient or legal guardian wants to designate a caregiver (see row info): No  Support Systems: Family Member(s)  Housing / Facility: [address]  Do You Take your Prescribed Medications Regularly: Yes  Able to Return to Previous ADL's: Future Time w/Therapy  Mobility Issues: Yes  Prior Services: Home-Independent  Patient Expects to be Discharged to:: (possibly to skilled)  Assistance Needed: Yes    Discharge Preparedness  What is your plan after discharge?: Skilled nursing facility  What are your discharge supports?: Spouse              Vision / Hearing Impairment  Vision Impairment : Yes  Right Eye Vision: Wears Glasses, Impaired  Left Eye Vision: Wears Glasses, Impaired  Hearing Impairment : No              Domestic Abuse  Have you ever been the victim of abuse or violence?: No  Physical Abuse or Sexual Abuse: No  Verbal Abuse or Emotional Abuse: No  Possible Abuse Reported to:: Not Applicable          Discharge Risks or Barriers  Discharge risks or barriers?: Post-acute placement / services  Patient risk factors: Cognitive / sensory / physical deficit    Anticipated Discharge Information  Anticipated discharge disposition: SNF

## 2019-10-29 NOTE — DISCHARGE PLANNING
Received Choice form at [id number]  Agency/Facility Name: #1 [facility], #2 [facility], [facility]3 [facility]  Referral sent per Choice form @ [phone]

## 2019-10-29 NOTE — DISCHARGE PLANNING
Anticipated Discharge Disposition: skilled    Action: Reviewed in IDT rounds, pt not med cleared yet. Met at bedside with pt to discuss dc plan of going to skilled rehab. Choices are #1 [facility], #2 [facility], and [facility]3 [facility]. Choice form completed and faxed to Tami LUNA to send.     Barriers to Discharge: not yet med cleared    Plan: referrals in progress, await acceptances and med clearance.     PASRR completed.

## 2019-10-29 NOTE — PROGRESS NOTES
Assumed care from Carolyn HERNANDEZ. Assessment completed. Pt A/Ox4. 3- way lockhart in place,  CBI continues, urine is light pink in color. Pt denies pain at this time. Pt is resting in chair watching TV. Chair alarm in place, call light in reach.

## 2019-10-29 NOTE — CARE PLAN
Problem: Communication  Goal: The ability to communicate needs accurately and effectively will improve  Outcome: PROGRESSING AS EXPECTED     Problem: Safety  Goal: Will remain free from falls  Outcome: PROGRESSING AS EXPECTED  Note:   Fall precautions in place. Patient calls when he needs to get up.

## 2019-10-29 NOTE — PROGRESS NOTES
Hospital Medicine Daily Progress Note    Date of Service  10/29/2019    Chief Complaint  84 y.o. male admitted 10/24/2019 with AMS and JAIRON    Hospital Course    84 y.o. male who presented on 10/24/2019 after being sent by his primary care provider for abnormal MRI of the brain. 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  The patient was evaluated by physical therapy who recommended placement for acute therapy.        Interval Problem Update   -Evaluated and examined the patient at the bedside   -Still having bloody urine   -Continue CBI    -Improving on his creatine   -Still working for placement but particularly is not clear yet.       Consultants/Specialty  Nephrology  urology    Code Status  full    Disposition  Placement after treating hematuria    Review of Systems  Review of Systems   Constitutional: Negative for chills, fever and weight loss.   HENT: Negative for congestion and sore throat.    Eyes: Negative for photophobia.   Respiratory: Negative for cough, shortness of breath and wheezing.    Cardiovascular: Negative for chest pain, palpitations and  orthopnea.   Gastrointestinal: Negative for abdominal pain, diarrhea, nausea and vomiting.   Genitourinary: Positive for hematuria. Negative for dysuria and urgency.   Musculoskeletal: Negative for myalgias and neck pain.   Skin: Negative for itching and rash.   Neurological: Positive for weakness. Negative for dizziness, tingling, focal weakness and headaches.        Confusion   Psychiatric/Behavioral: Positive for memory loss. Negative for depression, substance abuse and suicidal ideas.        Physical Exam  Temp:  [36.4 °C (97.6 °F)-36.7 °C (98.1 °F)] 36.4 °C (97.6 °F)  Pulse:  [69-93] 69  Resp:  [18] 18  BP: (122-155)/(78-97) 122/78  SpO2:  [92 %-99 %] 99 %    Physical Exam   Constitutional: He is oriented to person, place, and time. No distress.   HENT:   Head: Normocephalic and atraumatic.   Eyes: EOM are normal. Right eye exhibits no discharge. Left eye exhibits no discharge.   Neck: Neck supple. No JVD present. No tracheal deviation present.   Cardiovascular: Normal rate, regular rhythm and normal heart sounds.   Pulmonary/Chest: Effort normal and breath sounds normal. No respiratory distress.   Abdominal: Soft. Bowel sounds are normal. He exhibits no distension.   Genitourinary:   Genitourinary Comments: lockhart catheter in place with pinkish urine noted in the tubing and the Lockhart bag   Musculoskeletal: He exhibits no edema.   Neurological: He is alert and oriented to person, place, and time. No cranial nerve deficit.   Skin: Skin is warm and dry. No rash noted. He is not diaphoretic. No erythema.   Psychiatric: He has a normal mood and affect. His speech is not delayed. He is slowed. Cognition and memory are not impaired.   Nursing note and vitals reviewed.      Fluids    Intake/Output Summary (Last 24 hours) at 10/29/2019 1707  Last data filed at 10/29/2019 1619  Gross per 24 hour   Intake 1635 ml   Output 150 ml   Net 1485 ml       Laboratory  Recent Labs     10/27/19  0044 10/28/19  0057 10/29/19  0110    WBC 5.8 6.1 6.5   RBC 3.70* 3.89* 3.59*   HEMOGLOBIN 11.6* 12.3* 11.4*   HEMATOCRIT 35.2* 37.7* 34.5*   MCV 95.1 96.9 96.1   MCH 31.4 31.6 31.8   MCHC 33.0* 32.6* 33.0*   RDW 51.7* 51.5* 51.4*   PLATELETCT 185 182 164   MPV 10.1 10.1 10.3     Recent Labs     10/28/19  0057 10/28/19  0916 10/29/19  0110   SODIUM 144 144 142   POTASSIUM 4.1 4.1 3.9   CHLORIDE 113* 111 110   CO2 24 24 24   GLUCOSE 89 86 106*   BUN 28* 24* 26*   CREATININE 2.18* 1.93* 1.82*   CALCIUM 9.1 9.2 8.8                   Imaging  US-RENAL   Final Result         1.  Moderate bilateral hydronephrosis.   2.  Large bladder volume, approximately 2.5 L.   3.  Left lower pole simple cyst      These findings were discussed with the patient's clinician, Emma Boogie, on 10/25/2019 12:40 AM.           Assessment/Plan  * JAIRON (acute kidney injury) (HCC)  Assessment & Plan  Appears to be postobstructive given urinary retention  nonanion gap MA- resolving  Lockhart in place- excellent output, reddish clear  continue flomax 0.4 daily  Nephrology consulted, no need for HD at this time  Continue ivf  Urology recs to continue lockhart as outpatient and have patient fu after dc  Kidney functions gradually improving.  Avoid nephrotoxins.  Renal dose all medications.  Kidney function is improving gradually.      Gross hematuria- (present on admission)  Assessment & Plan  Likely traumatic after placing Lockhart catheter.  Continue with Lockhart catheter  Monitor H&H which has been stable  He was off CBI and on 10/28: Still having pink-colored urine with a few clots noted. Urology reinstituted CBI.  Continue CBI  Urology on board  Needs to keep the Lockhart and and follow-up with urology clinic as outpatient.     Abnormal MRI  Assessment & Plan  Likely due to acute metabolic abnormalities such as uremia in the light of JAIRON  Neurology consulted on admission, will treat metabolic factor first and then reasses  Mental status gradually improving.    HLD (hyperlipidemia)  Assessment  & Plan  This is chronic, okay to continue home Zocor.    HTN (hypertension)  Assessment & Plan  Holding home losartan for now in light of his JAIRON, monitor blood pressures and add PRN IV hydralazine if needed    Hypothyroidism  Assessment & Plan  Resume home synthyroid  Plan of care discussed with multidisciplinary team during rounds.    VTE prophylaxis: SCDs.  No anticoagulation for now due to hematuria.

## 2019-10-29 NOTE — PROGRESS NOTES
Note to reader: this note follows the APSO format rather than the historical SOAP format. Assessment and plan located at the top of the note for ease of use.    Chief Complaint  84 y.o. year old male here with renal failure, hydro and hematuria following lockhart placement. A 3 way catheter was placed in this patient yesterday and CBI was started. Hand irrigation was performed today. He had no overnight events and has no new complaints.     Assessment/Plan  Interval History   Urinary retention  JAIRON  Bilateral hydronephrosis  Gross hematuria  Anemia      Hand irrigation was performed for 5 minutes with no clots removed. Urine is a light pink color. CBI was restarted and rate was decreased.  -Continue Flomax  -Keep lockhart  -Continue CBI, hand irrigate PRN for clots  -When urine is clear-light pink for 24 hours we will discontinue CBI. Will consider discharge when urine is clear-light pink for 24 hours following d/c of CBI.  -Plan for outpatient voiding trial in 2 weeks with MIKE and BMP prior     Case discussed with patient and Urology, Dr.Garey-Sage HEDRICK  Patient seen and examined    10/29 - Patient was seen and examined. Hand irrigation was performed with no clots removed. CBI restarted.     10/28. CBI off. Urine now cherry red with some clots. Denies pain. Cr improving, now 1.93. H&H stable. Nursing note reports hand irrigation x2 last night.      10/27. CBI off. No clot obstruction. Denies bladder pains. Creat improving.    10/26. Urine has cleared and CBI is off. Creat improved. H/H stable. Denies pains.          Review of Systems  Physical Exam   Review of Systems   Constitutional: Negative for chills and fever.   Genitourinary: Positive for hematuria. Negative for urgency.     Vitals:    10/28/19 1630 10/28/19 1900 10/29/19 0315 10/29/19 0755   BP: 110/61 155/97 136/81 122/78   Pulse: (!) 58 93 79 69   Resp: 16 18 18 18   Temp: 37.2 °C (99 °F) 36.7 °C (98.1 °F) 36.4 °C (97.6 °F) 36.4 °C (97.6 °F)   TempSrc: Temporal  Temporal Temporal Temporal   SpO2: 98% 92% 99% 99%   Weight:       Height:         Physical Exam   Constitutional: He is oriented to person, place, and time and well-developed, well-nourished, and in no distress. No distress.   HENT:   Head: Normocephalic and atraumatic.   Eyes: Conjunctivae are normal. Left eye exhibits no discharge.   Neck: Normal range of motion. Neck supple.   Pulmonary/Chest: Effort normal.   Abdominal: Soft.   Genitourinary:   Genitourinary Comments: Rosa in place draining light pink urine with no clots seen.   Musculoskeletal: He exhibits no edema.   Neurological: He is alert and oriented to person, place, and time.   Skin: Skin is warm and dry.   Psychiatric: Affect and judgment normal.   Nursing note and vitals reviewed.       Hematology Chemistry   Lab Results   Component Value Date/Time    WBC 6.5 10/29/2019 01:10 AM    HEMOGLOBIN 11.4 (L) 10/29/2019 01:10 AM    HEMATOCRIT 34.5 (L) 10/29/2019 01:10 AM    PLATELETCT 164 10/29/2019 01:10 AM     Lab Results   Component Value Date/Time    SODIUM 144 10/28/2019 09:16 AM    POTASSIUM 4.1 10/28/2019 09:16 AM    CHLORIDE 111 10/28/2019 09:16 AM    CO2 24 10/28/2019 09:16 AM    GLUCOSE 86 10/28/2019 09:16 AM    BUN 24 (H) 10/28/2019 09:16 AM    CREATININE 1.93 (H) 10/28/2019 09:16 AM         Labs not explicitly included in this progress note were reviewed by the author.   Radiology/imaging not explicitly included in this progress note was reviewed by the author.     Core Measures

## 2019-10-29 NOTE — CARE PLAN
Problem: Safety  Goal: Will remain free from falls  Outcome: PROGRESSING AS EXPECTED  Intervention: Assess risk factors for falls  Flowsheets  Taken 10/28/2019 2130 by Piper Sims RIsiahNIsiah  Pt Calls for Assistance: Yes  Taken 10/28/2019 1120 by Carolyn Gao R.N.  Fall Risk: Risk to Fall -  0 - 1 point  History of fall: 0  Mobility Status Assessment: 1-1 Healthcare Provider Required for Assistance with Ambulation & Transfer  Risk for Injury-Any positive answers results in the pt being at high risk for fall related injury: Not Applicable  Note:   All fall precautions in place. Safety education provided     Problem: Pain Management  Goal: Pain level will decrease to patient's comfort goal  Outcome: PROGRESSING AS EXPECTED  Intervention: Follow pain managment plan developed in collaboration with patient and Interdisciplinary Team  Note:   Pain maintains low level of pain to no pain regarding catheter and bladder. Prn meds avail as needed

## 2019-10-29 NOTE — DISCHARGE PLANNING
Anticipated Discharge Disposition: TBD    Action: LSW met with pt for additional information. Facesheet reviewed and verified. Pt stated he and his wife have Will's at home, will request niece to bring in copy. Pt stated that he and is wife decided to stop driving, niece provides all transportation. Pt stated he was independent with ADL's, no DME, prior to admission. No financial concerns, pt stated they are in the process of signing up for Meals on Wheels. Pt confirmed pharmacy; [facility] in [place].     Barriers to Discharge: None    Plan: Continue to assess for discharge needs.

## 2019-10-30 LAB
ANION GAP SERPL CALC-SCNC: 7 MMOL/L (ref 0–11.9)
BASOPHILS # BLD AUTO: 0.6 % (ref 0–1.8)
BASOPHILS # BLD: 0.04 K/UL (ref 0–0.12)
BUN SERPL-MCNC: 24 MG/DL (ref 8–22)
CALCIUM SERPL-MCNC: 9 MG/DL (ref 8.5–10.5)
CHLORIDE SERPL-SCNC: 107 MMOL/L (ref 96–112)
CO2 SERPL-SCNC: 25 MMOL/L (ref 20–33)
CREAT SERPL-MCNC: 1.54 MG/DL (ref 0.5–1.4)
EOSINOPHIL # BLD AUTO: 0.36 K/UL (ref 0–0.51)
EOSINOPHIL NFR BLD: 5.3 % (ref 0–6.9)
ERYTHROCYTE [DISTWIDTH] IN BLOOD BY AUTOMATED COUNT: 51.8 FL (ref 35.9–50)
GLUCOSE SERPL-MCNC: 108 MG/DL (ref 65–99)
HCT VFR BLD AUTO: 35.6 % (ref 42–52)
HGB BLD-MCNC: 11.6 G/DL (ref 14–18)
IMM GRANULOCYTES # BLD AUTO: 0.03 K/UL (ref 0–0.11)
IMM GRANULOCYTES NFR BLD AUTO: 0.4 % (ref 0–0.9)
LYMPHOCYTES # BLD AUTO: 0.8 K/UL (ref 1–4.8)
LYMPHOCYTES NFR BLD: 11.9 % (ref 22–41)
MAGNESIUM SERPL-MCNC: 1.2 MG/DL (ref 1.5–2.5)
MCH RBC QN AUTO: 31.4 PG (ref 27–33)
MCHC RBC AUTO-ENTMCNC: 32.6 G/DL (ref 33.7–35.3)
MCV RBC AUTO: 96.2 FL (ref 81.4–97.8)
MONOCYTES # BLD AUTO: 0.57 K/UL (ref 0–0.85)
MONOCYTES NFR BLD AUTO: 8.5 % (ref 0–13.4)
NEUTROPHILS # BLD AUTO: 4.94 K/UL (ref 1.82–7.42)
NEUTROPHILS NFR BLD: 73.3 % (ref 44–72)
NRBC # BLD AUTO: 0 K/UL
NRBC BLD-RTO: 0 /100 WBC
PHOSPHATE SERPL-MCNC: 3 MG/DL (ref 2.5–4.5)
PLATELET # BLD AUTO: 156 K/UL (ref 164–446)
PMV BLD AUTO: 10.1 FL (ref 9–12.9)
POTASSIUM SERPL-SCNC: 3.7 MMOL/L (ref 3.6–5.5)
RBC # BLD AUTO: 3.7 M/UL (ref 4.7–6.1)
SODIUM SERPL-SCNC: 139 MMOL/L (ref 135–145)
WBC # BLD AUTO: 6.7 K/UL (ref 4.8–10.8)

## 2019-10-30 PROCEDURE — 85025 COMPLETE CBC W/AUTO DIFF WBC: CPT

## 2019-10-30 PROCEDURE — A9270 NON-COVERED ITEM OR SERVICE: HCPCS | Performed by: INTERNAL MEDICINE

## 2019-10-30 PROCEDURE — 700105 HCHG RX REV CODE 258: Performed by: HOSPITALIST

## 2019-10-30 PROCEDURE — 83735 ASSAY OF MAGNESIUM: CPT

## 2019-10-30 PROCEDURE — A9270 NON-COVERED ITEM OR SERVICE: HCPCS | Performed by: HOSPITALIST

## 2019-10-30 PROCEDURE — 80048 BASIC METABOLIC PNL TOTAL CA: CPT

## 2019-10-30 PROCEDURE — 770006 HCHG ROOM/CARE - MED/SURG/GYN SEMI*

## 2019-10-30 PROCEDURE — 99232 SBSQ HOSP IP/OBS MODERATE 35: CPT | Performed by: INTERNAL MEDICINE

## 2019-10-30 PROCEDURE — 700102 HCHG RX REV CODE 250 W/ 637 OVERRIDE(OP): Performed by: INTERNAL MEDICINE

## 2019-10-30 PROCEDURE — 700102 HCHG RX REV CODE 250 W/ 637 OVERRIDE(OP): Performed by: HOSPITALIST

## 2019-10-30 PROCEDURE — 84100 ASSAY OF PHOSPHORUS: CPT

## 2019-10-30 PROCEDURE — 700111 HCHG RX REV CODE 636 W/ 250 OVERRIDE (IP): Performed by: INTERNAL MEDICINE

## 2019-10-30 PROCEDURE — 36415 COLL VENOUS BLD VENIPUNCTURE: CPT

## 2019-10-30 RX ORDER — OMEPRAZOLE 20 MG/1
20 CAPSULE, DELAYED RELEASE ORAL DAILY
Status: DISCONTINUED | OUTPATIENT
Start: 2019-10-30 | End: 2019-10-31 | Stop reason: HOSPADM

## 2019-10-30 RX ORDER — MAGNESIUM SULFATE HEPTAHYDRATE 40 MG/ML
4 INJECTION, SOLUTION INTRAVENOUS ONCE
Status: COMPLETED | OUTPATIENT
Start: 2019-10-30 | End: 2019-10-30

## 2019-10-30 RX ADMIN — OMEPRAZOLE 20 MG: 20 CAPSULE, DELAYED RELEASE ORAL at 13:21

## 2019-10-30 RX ADMIN — MAGNESIUM SULFATE IN WATER 4 G: 40 INJECTION, SOLUTION INTRAVENOUS at 10:15

## 2019-10-30 RX ADMIN — SODIUM CHLORIDE, POTASSIUM CHLORIDE, SODIUM LACTATE AND CALCIUM CHLORIDE: 600; 310; 30; 20 INJECTION, SOLUTION INTRAVENOUS at 21:00

## 2019-10-30 RX ADMIN — TAMSULOSIN HYDROCHLORIDE 0.4 MG: 0.4 CAPSULE ORAL at 09:13

## 2019-10-30 RX ADMIN — SODIUM CHLORIDE, POTASSIUM CHLORIDE, SODIUM LACTATE AND CALCIUM CHLORIDE: 600; 310; 30; 20 INJECTION, SOLUTION INTRAVENOUS at 00:45

## 2019-10-30 RX ADMIN — SODIUM CHLORIDE, POTASSIUM CHLORIDE, SODIUM LACTATE AND CALCIUM CHLORIDE: 600; 310; 30; 20 INJECTION, SOLUTION INTRAVENOUS at 09:55

## 2019-10-30 RX ADMIN — LEVOTHYROXINE SODIUM 88 MCG: 88 TABLET ORAL at 04:49

## 2019-10-30 ASSESSMENT — ENCOUNTER SYMPTOMS
CONSTITUTIONAL NEGATIVE: 1
EYES NEGATIVE: 1
RESPIRATORY NEGATIVE: 1
FEVER: 0
GASTROINTESTINAL NEGATIVE: 1
CARDIOVASCULAR NEGATIVE: 1
MUSCULOSKELETAL NEGATIVE: 1
PSYCHIATRIC NEGATIVE: 1
CHILLS: 0

## 2019-10-30 NOTE — CARE PLAN
Problem: Urinary Elimination:  Goal: Ability to reestablish a normal urinary elimination pattern will improve  Outcome: PROGRESSING SLOWER THAN EXPECTED  Flowsheets (Taken 10/30/2019 0319)  Urinary Elimination: Catheter (Document on LDA)  Note:   CBI continues, output is light pink in color with a few clots. No manual irrigation needed.      Problem: Skin Integrity  Goal: Risk for impaired skin integrity will decrease  Outcome: PROGRESSING AS EXPECTED  Note:   Q 2 turns, pillows for support and repositioning.

## 2019-10-30 NOTE — PROGRESS NOTES
Hospital Medicine Daily Progress Note    Date of Service  10/30/2019    Chief Complaint  84 y.o. male admitted 10/24/2019 with AMS and JAIRON    Hospital Course    84 y.o. male who presented on 10/24/2019 after being sent by his primary care provider for abnormal MRI of the brain. 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  The patient was evaluated by physical therapy who recommended placement for acute therapy.        Interval Problem Update   -Evaluated and examined the patient at the bedside   -His urine is clear today   -Monitor him for 24 hours and then discharge him to SNF   -Improving on his creatine   -Low magnesium>> replaced      Consultants/Specialty  Nephrology  urology    Code Status  full    Disposition  Placement after treating hematuria    Review of Systems  Review of Systems   Constitutional: Negative.    HENT: Negative.    Eyes: Negative.    Respiratory: Negative.    Cardiovascular: Negative.    Gastrointestinal: Negative.    Genitourinary: Negative.    Musculoskeletal: Negative.    Psychiatric/Behavioral: Negative.           Physical Exam  Temp:   [35.9 °C (96.6 °F)-36.6 °C (97.8 °F)] 36.6 °C (97.8 °F)  Pulse:  [78-94] 81  Resp:  [17-20] 20  BP: (114-151)/(61-89) 114/61  SpO2:  [98 %-99 %] 98 %    Physical Exam   Constitutional: He is oriented to person, place, and time. No distress.   HENT:   Head: Normocephalic and atraumatic.   Eyes: EOM are normal. Right eye exhibits no discharge. Left eye exhibits no discharge.   Neck: Neck supple. No JVD present. No tracheal deviation present.   Cardiovascular: Normal rate, regular rhythm and normal heart sounds.   Pulmonary/Chest: Effort normal and breath sounds normal. No respiratory distress.   Abdominal: Soft. Bowel sounds are normal. He exhibits no distension.   Genitourinary:   Genitourinary Comments: lockhart catheter in place with pinkish urine noted in the tubing and the Lockhart bag   Musculoskeletal: He exhibits no edema.   Neurological: He is alert and oriented to person, place, and time. No cranial nerve deficit.   Skin: Skin is warm and dry. No rash noted. He is not diaphoretic. No erythema.   Psychiatric: He has a normal mood and affect. His speech is not delayed. He is slowed. Cognition and memory are not impaired.   Nursing note and vitals reviewed.      Fluids    Intake/Output Summary (Last 24 hours) at 10/30/2019 1751  Last data filed at 10/30/2019 1445  Gross per 24 hour   Intake 970 ml   Output -1650 ml   Net 2620 ml       Laboratory  Recent Labs     10/28/19  0057 10/29/19  0110 10/30/19  0258   WBC 6.1 6.5 6.7   RBC 3.89* 3.59* 3.70*   HEMOGLOBIN 12.3* 11.4* 11.6*   HEMATOCRIT 37.7* 34.5* 35.6*   MCV 96.9 96.1 96.2   MCH 31.6 31.8 31.4   MCHC 32.6* 33.0* 32.6*   RDW 51.5* 51.4* 51.8*   PLATELETCT 182 164 156*   MPV 10.1 10.3 10.1     Recent Labs     10/28/19  0916 10/29/19  0110 10/30/19  0258   SODIUM 144 142 139   POTASSIUM 4.1 3.9 3.7   CHLORIDE 111 110 107   CO2 24 24 25   GLUCOSE 86 106* 108*   BUN 24* 26* 24*   CREATININE 1.93* 1.82* 1.54*   CALCIUM 9.2 8.8 9.0                   Imaging  US-RENAL    Final Result         1.  Moderate bilateral hydronephrosis.   2.  Large bladder volume, approximately 2.5 L.   3.  Left lower pole simple cyst      These findings were discussed with the patient's clinician, Emma Boogie, on 10/25/2019 12:40 AM.           Assessment/Plan  * JAIRON (acute kidney injury) (HCC)  Assessment & Plan  Appears to be postobstructive given urinary retention  nonanion gap MA- resolving  Lockhart in place- excellent output, reddish clear  continue flomax 0.4 daily  Nephrology consulted, no need for HD at this time  Continue ivf  Urology recs to continue lockhart as outpatient and have patient fu after dc  Kidney functions gradually improving.  Avoid nephrotoxins.  Renal dose all medications.  Kidney function is improving gradually.      Gross hematuria- (present on admission)  Assessment & Plan  Likely traumatic after placing Lockhart catheter.  Continue with Lockhart catheter  Monitor H&H which has been stable  He was off CBI and on 10/28: Still having pink-colored urine with a few clots noted. Urology reinstituted CBI.  Discontinue CBI and monitoring him for 24-hour before discharge  Urology sign off  Needs to keep the Lockhart and and follow-up with urology clinic as outpatient in 2 weeks for voiding trial.  Continue Flomax     Abnormal MRI  Assessment & Plan  Likely due to acute metabolic abnormalities such as uremia in the light of JAIRON  Neurology consulted on admission, will treat metabolic factor first and then reasses  Mental status gradually improving, alert and oriented x3 no need for repeating MRI at this time.    HLD (hyperlipidemia)  Assessment & Plan  This is chronic, okay to continue home Zocor.    HTN (hypertension)  Assessment & Plan  Holding home losartan for now in light of his JAIRON, monitor blood pressures and add PRN IV hydralazine if needed    Hypothyroidism  Assessment & Plan  Resume home synthyroid  Plan of care discussed with multidisciplinary team during rounds.    VTE prophylaxis:  SCDs.  No anticoagulation for now due to hematuria.

## 2019-10-30 NOTE — PROGRESS NOTES
Note to reader: this note follows the APSO format rather than the historical SOAP format. Assessment and plan located at the top of the note for ease of use.    Chief Complaint  84 y.o. year old male here with renal failure, hydro and hematuria following lockhart placement. A 3 way catheter was placed in this patient 2 days ago and CBI was started. He had no overnight events and has no new complaints.     Assessment/Plan  Interval History   Urinary retention  JAIRON  Bilateral hydronephrosis  Gross hematuria  Anemia    Urine clear yellow off CBI  -Continue Flomax  -OK to DC when stable. Keep lockhart in place. Plan for outpatient voiding trial in 2 weeks with MIKE and BMP prior (we will arrange)  -Urology signing off.     Case discussed with patient and Urology, Dr.Garey-Sage HEDRICK  Patient seen and examined    10/29 - CBI stopped now and urine continued to appear clear after 15 minutes. Pt with no complains at this time. H&H trending up, Cr improving to 1.54.    10/28. CBI off. Urine now cherry red with some clots. Denies pain. Cr improving, now 1.93. H&H stable. Nursing note reports hand irrigation x2 last night.      10/27. CBI off. No clot obstruction. Denies bladder pains. Creat improving.    10/26. Urine has cleared and CBI is off. Creat improved. H/H stable. Denies pains.          Review of Systems  Physical Exam   Review of Systems   Constitutional: Negative for chills and fever.   Genitourinary: Negative for frequency, hematuria and urgency.     Vitals:    10/29/19 1545 10/29/19 1915 10/30/19 0330 10/30/19 0715   BP: 133/87 151/89 124/82 143/77   Pulse: 79 94 79 78   Resp: 16 17 18 18   Temp: 36.6 °C (97.9 °F) 36.3 °C (97.3 °F) 36.2 °C (97.1 °F) 35.9 °C (96.6 °F)   TempSrc: Temporal Temporal Temporal Temporal   SpO2: 100% 98% 99% 99%   Weight:       Height:         Physical Exam   Constitutional: He is oriented to person, place, and time and well-developed, well-nourished, and in no distress. No distress.   HENT:    Head: Normocephalic and atraumatic.   Eyes: Left eye exhibits no discharge.   Neck: Normal range of motion. Neck supple.   Pulmonary/Chest: Effort normal.   Genitourinary:   Genitourinary Comments: Rosa in place draining clear yellow urine   Musculoskeletal: He exhibits no edema.   Neurological: He is alert and oriented to person, place, and time.   Skin: Skin is warm and dry. He is not diaphoretic.   Psychiatric: Affect and judgment normal.   Nursing note and vitals reviewed.       Hematology Chemistry   Lab Results   Component Value Date/Time    WBC 6.7 10/30/2019 02:58 AM    HEMOGLOBIN 11.6 (L) 10/30/2019 02:58 AM    HEMATOCRIT 35.6 (L) 10/30/2019 02:58 AM    PLATELETCT 156 (L) 10/30/2019 02:58 AM     Lab Results   Component Value Date/Time    SODIUM 139 10/30/2019 02:58 AM    POTASSIUM 3.7 10/30/2019 02:58 AM    CHLORIDE 107 10/30/2019 02:58 AM    CO2 25 10/30/2019 02:58 AM    GLUCOSE 108 (H) 10/30/2019 02:58 AM    BUN 24 (H) 10/30/2019 02:58 AM    CREATININE 1.54 (H) 10/30/2019 02:58 AM         Labs not explicitly included in this progress note were reviewed by the author.   Radiology/imaging not explicitly included in this progress note was reviewed by the author.     Core Measures

## 2019-10-30 NOTE — DISCHARGE PLANNING
Agency/Facility Name: [facility]   Spoke To: Camilo  Outcome: Patient accepted, pending bed. Camilo will call back with bed update shortly.

## 2019-10-30 NOTE — DISCHARGE PLANNING
Anticipated Disposition:  SNF    Action:   During Round Meeting.  said Pt is not medically clear to be discharged. Will follow up in Am.           Barriers to Discharge:   Medical clearance.       Plan:  Follow up with attending physician for medical clearance. Then DC to SNF.

## 2019-10-30 NOTE — PROGRESS NOTES
Assumed care of patient from Carolyn HERNANDEZ at 1900. Assessment completed at this time. Pt is A/Ox4 but forgetful. 3-way lockhart remains in place,CBI continues. Urine is light pink In color. Pr denies pain at this time. Bed in low locked position, call light in reach.

## 2019-10-30 NOTE — CARE PLAN
Problem: Safety  Goal: Will remain free from falls  Outcome: PROGRESSING AS EXPECTED  Intervention: Assess risk factors for falls  Flowsheets  Taken 10/29/2019 2033 by Piper Sims RIsiahNIsiah  Pt Calls for Assistance: Yes  Taken 10/28/2019 1120 by Carolyn Gao R.N.  Fall Risk: Risk to Fall -  0 - 1 point  History of fall: 0  Mobility Status Assessment: 1-1 Healthcare Provider Required for Assistance with Ambulation & Transfer  Risk for Injury-Any positive answers results in the pt being at high risk for fall related injury: Not Applicable  Note:   All fall precautions in place at this time.      Problem: Pain Management  Goal: Pain level will decrease to patient's comfort goal  Outcome: PROGRESSING AS EXPECTED  Intervention: Follow pain managment plan developed in collaboration with patient and Interdisciplinary Team  Note:   No complaints of pain at this time

## 2019-10-31 VITALS
BODY MASS INDEX: 25.34 KG/M2 | SYSTOLIC BLOOD PRESSURE: 131 MMHG | HEIGHT: 70 IN | TEMPERATURE: 97.8 F | HEART RATE: 76 BPM | DIASTOLIC BLOOD PRESSURE: 72 MMHG | RESPIRATION RATE: 17 BRPM | OXYGEN SATURATION: 96 % | WEIGHT: 177.03 LBS

## 2019-10-31 PROBLEM — N17.9 AKI (ACUTE KIDNEY INJURY) (HCC): Status: RESOLVED | Noted: 2019-10-24 | Resolved: 2019-10-31

## 2019-10-31 PROBLEM — R31.0 GROSS HEMATURIA: Status: RESOLVED | Noted: 2019-10-27 | Resolved: 2019-10-31

## 2019-10-31 LAB
ANION GAP SERPL CALC-SCNC: 6 MMOL/L (ref 0–11.9)
BASOPHILS # BLD AUTO: 0.7 % (ref 0–1.8)
BASOPHILS # BLD: 0.04 K/UL (ref 0–0.12)
BUN SERPL-MCNC: 22 MG/DL (ref 8–22)
CALCIUM SERPL-MCNC: 8.5 MG/DL (ref 8.5–10.5)
CHLORIDE SERPL-SCNC: 109 MMOL/L (ref 96–112)
CO2 SERPL-SCNC: 28 MMOL/L (ref 20–33)
CREAT SERPL-MCNC: 1.57 MG/DL (ref 0.5–1.4)
EOSINOPHIL # BLD AUTO: 0.33 K/UL (ref 0–0.51)
EOSINOPHIL NFR BLD: 5.4 % (ref 0–6.9)
ERYTHROCYTE [DISTWIDTH] IN BLOOD BY AUTOMATED COUNT: 52.1 FL (ref 35.9–50)
GLUCOSE SERPL-MCNC: 83 MG/DL (ref 65–99)
HCT VFR BLD AUTO: 33.1 % (ref 42–52)
HGB BLD-MCNC: 10.7 G/DL (ref 14–18)
IMM GRANULOCYTES # BLD AUTO: 0.03 K/UL (ref 0–0.11)
IMM GRANULOCYTES NFR BLD AUTO: 0.5 % (ref 0–0.9)
LYMPHOCYTES # BLD AUTO: 1.22 K/UL (ref 1–4.8)
LYMPHOCYTES NFR BLD: 20.1 % (ref 22–41)
MAGNESIUM SERPL-MCNC: 1.8 MG/DL (ref 1.5–2.5)
MCH RBC QN AUTO: 31.3 PG (ref 27–33)
MCHC RBC AUTO-ENTMCNC: 32.3 G/DL (ref 33.7–35.3)
MCV RBC AUTO: 96.8 FL (ref 81.4–97.8)
MONOCYTES # BLD AUTO: 0.6 K/UL (ref 0–0.85)
MONOCYTES NFR BLD AUTO: 9.9 % (ref 0–13.4)
NEUTROPHILS # BLD AUTO: 3.84 K/UL (ref 1.82–7.42)
NEUTROPHILS NFR BLD: 63.4 % (ref 44–72)
NRBC # BLD AUTO: 0 K/UL
NRBC BLD-RTO: 0 /100 WBC
PLATELET # BLD AUTO: 172 K/UL (ref 164–446)
PMV BLD AUTO: 10.2 FL (ref 9–12.9)
POTASSIUM SERPL-SCNC: 3.9 MMOL/L (ref 3.6–5.5)
RBC # BLD AUTO: 3.42 M/UL (ref 4.7–6.1)
SODIUM SERPL-SCNC: 143 MMOL/L (ref 135–145)
WBC # BLD AUTO: 6.1 K/UL (ref 4.8–10.8)

## 2019-10-31 PROCEDURE — 85025 COMPLETE CBC W/AUTO DIFF WBC: CPT

## 2019-10-31 PROCEDURE — 80048 BASIC METABOLIC PNL TOTAL CA: CPT

## 2019-10-31 PROCEDURE — 83735 ASSAY OF MAGNESIUM: CPT

## 2019-10-31 PROCEDURE — 99239 HOSP IP/OBS DSCHRG MGMT >30: CPT | Performed by: INTERNAL MEDICINE

## 2019-10-31 PROCEDURE — 700102 HCHG RX REV CODE 250 W/ 637 OVERRIDE(OP): Performed by: INTERNAL MEDICINE

## 2019-10-31 PROCEDURE — A9270 NON-COVERED ITEM OR SERVICE: HCPCS | Performed by: INTERNAL MEDICINE

## 2019-10-31 PROCEDURE — 36415 COLL VENOUS BLD VENIPUNCTURE: CPT

## 2019-10-31 PROCEDURE — 700102 HCHG RX REV CODE 250 W/ 637 OVERRIDE(OP): Performed by: HOSPITALIST

## 2019-10-31 PROCEDURE — A9270 NON-COVERED ITEM OR SERVICE: HCPCS | Performed by: HOSPITALIST

## 2019-10-31 RX ORDER — TAMSULOSIN HYDROCHLORIDE 0.4 MG/1
0.4 CAPSULE ORAL
Qty: 30 CAP | Refills: 1 | Status: SHIPPED | OUTPATIENT
Start: 2019-11-01

## 2019-10-31 RX ADMIN — TAMSULOSIN HYDROCHLORIDE 0.4 MG: 0.4 CAPSULE ORAL at 08:05

## 2019-10-31 RX ADMIN — LEVOTHYROXINE SODIUM 88 MCG: 88 TABLET ORAL at 04:55

## 2019-10-31 RX ADMIN — OMEPRAZOLE 20 MG: 20 CAPSULE, DELAYED RELEASE ORAL at 04:55

## 2019-10-31 NOTE — DISCHARGE SUMMARY
Discharge Summary    CHIEF COMPLAINT ON ADMISSION  Chief Complaint   Patient presents with   • Sent by MD       Reason for Admission  Sent by MD; Abnormal MRI     CODE STATUS  Full Code    HPI & HOSPITAL COURSE     84 y.o. male who presented on 10/24/2019 after being sent by his primary care provider for abnormal MRI of the brain for behavioral changes that began in the last month, who ordered MRI and showed possible restricted diffusion in the bilateral parietal occipital region which carries a extensive differential including Creutzfeldt Thang, metabolic encephalopathy, or potential seizure activity, the patient was evaluated by a neurologist who suggested these imaging findings are secondary to an underlying metabolic encephalopathy due to acute kidney failure and uremia and the neurologist recommended follow-up with repeat MRI if there is no improvement. Also the patient had acute kidney injury with creatinine around 6 due to obstructive uropathy, was evaluated by urologist who recommended starting Flomax and CBI which showed some clot, and no cystoscopy was done and the urologist recommended to follow-up with the clinic in 2 weeks for voiding trial and removing the Rosa, however his urine got cleard and no more clots or blood, and his creatinine has improved significantly from 6.4 to1.5 with improving and his mental status.  The patient was evaluated by physical therapy who recommended placement for acute therapy.   On the day of discharge the patient is alert and oriented x4, cooperative, his urine is clear from the Rosa more than 24 hours, reviewed all his labs with resolving on his leukocytosis, and his creatinine back to baseline.  I discussed the plan of care with the patient and explained to him the importance of medication adherence and following up with the urologist to remove the Rosa, he understood and agreed.    Therefore, he is discharged in good and stable condition to home with close outpatient  follow-up.    The patient met 2-midnight criteria for an inpatient stay at the time of discharge.      FOLLOW UP ITEMS POST DISCHARGE  Urology clinic for voiding trial and removing the Rosa  Primary care doctor    DISCHARGE DIAGNOSES  Principal Problem (Resolved):    JAIRON (acute kidney injury) (HCC) POA: Unknown  Active Problems:    Abnormal MRI POA: Unknown    HLD (hyperlipidemia) POA: Unknown    Hypothyroidism POA: Unknown    HTN (hypertension) POA: Unknown  Resolved Problems:    Gross hematuria POA: Yes      FOLLOW UP    Yusef Ackerman M.D.  [address] [place] [address]  [phone]      We will contact to arrange voiding trial in office in 2 weeks    Yusef Ackerman M.D.  [address] [facility]  [place] [address]  [phone]      Plan for outpatient voiding trial in 2 weeks with MIKE and BMP prior     Vini Menjivar M.D.  [address]  [address]  [facility] [address]  [phone]    In 2 weeks        MEDICATIONS ON DISCHARGE     Medication List      START taking these medications      Instructions   tamsulosin 0.4 MG capsule  Start taking on:  11/1/2019  Commonly known as:  FLOMAX   Take 1 Cap by mouth ONE-HALF HOUR AFTER BREAKFAST.  Dose:  0.4 mg        CONTINUE taking these medications      Instructions   allopurinol 100 MG Tabs  Commonly known as:  ZYLOPRIM   Take 100 mg by mouth every day.  Dose:  100 mg     fluocinonide 0.05 % Crea  Commonly known as:  LIDEX   Apply  to affected area(s) 2 times a day. Apply sparingly to both legs twice a day as needed for rash     furosemide 20 MG Tabs  Commonly known as:  LASIX   Take 20 mg by mouth every morning.  Dose:  20 mg     levothyroxine 88 MCG Tabs  Commonly known as:  SYNTHROID   Take 88 mcg by mouth Every morning on an empty stomach.  Dose:  88 mcg     losartan 100 MG Tabs  Commonly known as:  COZAAR   Take 100 mg by mouth every day.  Dose:  100 mg     omeprazole 20 MG delayed-release capsule  Commonly known as:  PRILOSEC   Take 40 mg by mouth  every day. Indications: Gastroesophageal Reflux Disease  Dose:  40 mg     simvastatin 20 MG Tabs  Commonly known as:  ZOCOR   Take 20 mg by mouth every evening.  Dose:  20 mg     therapeutic multivitamin-minerals Tabs   Take 1 Tab by mouth every day.  Dose:  1 Tab     VITAMIN D3 PO   Take 1 Dose by mouth every day. Unknown OTC Strength.  Dose:  1 Dose            Allergies  No Known Allergies    DIET  Orders Placed This Encounter   Procedures   • Diet Order Renal     Standing Status:   Standing     Number of Occurrences:   1     Order Specific Question:   Diet:     Answer:   Renal [8]       ACTIVITY  As tolerated.  Weight bearing as tolerated    LINES, DRAINS, AND WOUNDS  This is an automated list. Peripheral IVs will be removed prior to discharge.  Peripheral IV 10/24/19 20 G Left Antecubital (Active)   Site Assessment Clean;Dry;Intact 10/31/2019  8:00 AM   Dressing Type Transparent 10/31/2019  8:00 AM   Line Status Infusing 10/31/2019  8:00 AM   Dressing Status Clean;Dry;Intact 10/31/2019  8:00 AM   Dressing Intervention N/A 10/31/2019  8:00 AM   Date Primary Tubing Changed 10/29/19 10/30/2019  8:00 PM   Date Secondary Tubing Changed 10/30/19 10/30/2019  9:15 AM   NEXT Primary Tubing Change  11/02/19 10/30/2019  8:00 PM   [facility] 0 10/31/2019  8:00 AM   Phlebitis Scale ([facility] Only) 0 10/31/2019  8:00 AM     Continuous Bladder Irrigation Triple-lumen 18 Fr (Active)   Site Assessment Clean;Skin intact 10/31/2019  8:00 AM   Collection Container Standard drainage bag 10/31/2019  8:00 AM   Securement Method Securing device (Describe) 10/31/2019  8:00 AM   CBI Irrigation Intake (mL) 3000 mL 10/30/2019  9:55 AM   CBI Rosa Output (mL) 1200 mL 10/31/2019  5:45 AM   CBI Net Output (mL) 0 mL 10/30/2019  9:55 AM         Peripheral IV 10/24/19 20 G Left Antecubital (Active)   Site Assessment Clean;Dry;Intact 10/31/2019  8:00 AM   Dressing Type Transparent 10/31/2019  8:00 AM   Line Status Infusing  10/31/2019  8:00 AM   Dressing Status Clean;Dry;Intact 10/31/2019  8:00 AM   Dressing Intervention N/A 10/31/2019  8:00 AM   Date Primary Tubing Changed 10/29/19 10/30/2019  8:00 PM   Date Secondary Tubing Changed 10/30/19 10/30/2019  9:15 AM   NEXT Primary Tubing Change  11/02/19 10/30/2019  8:00 PM   [facility] ([facility]) 0 10/31/2019  8:00 AM   Phlebitis Scale ([facility] Only) 0 10/31/2019  8:00 AM           Continuous Bladder Irrigation Triple-lumen 18 Fr (Active)   Site Assessment Clean;Skin intact 10/31/2019  8:00 AM   Collection Container Standard drainage bag 10/31/2019  8:00 AM   Securement Method Securing device (Describe) 10/31/2019  8:00 AM   CBI Irrigation Intake (mL) 3000 mL 10/30/2019  9:55 AM   CBI Rosa Output (mL) 1200 mL 10/31/2019  5:45 AM   CBI Net Output (mL) 0 mL 10/30/2019  9:55 AM        MENTAL STATUS ON TRANSFER  Level of Consciousness: Alert  Orientation : Oriented x 4  Speech: Speech Clear    CONSULTATIONS  Urology  Neurology    PROCEDURES  No procedures    LABORATORY  Lab Results   Component Value Date    SODIUM 143 10/31/2019    POTASSIUM 3.9 10/31/2019    CHLORIDE 109 10/31/2019    CO2 28 10/31/2019    GLUCOSE 83 10/31/2019    BUN 22 10/31/2019    CREATININE 1.57 (H) 10/31/2019        Lab Results   Component Value Date    WBC 6.1 10/31/2019    HEMOGLOBIN 10.7 (L) 10/31/2019    HEMATOCRIT 33.1 (L) 10/31/2019    PLATELETCT 172 10/31/2019        Total time of the discharge process exceeds 34 minutes.

## 2019-10-31 NOTE — DISCHARGE PLANNING
Agency/Facility Name: [facility]  Spoke To: Camilo  Outcome: Has a bed and contacting transport now.    DOMINIQUE informed.    @1030  Agency/Facility Name: [facility]  Spoke To: Camilo  Outcome: Transport set up for 1300.    DOMINIQUE informed.

## 2019-10-31 NOTE — CARE PLAN
Problem: Discharge Barriers/Planning  Goal: Patient's continuum of care needs will be met  Intervention: Assess potential discharge barriers on admission and throughout hospital stay  Note:   Pt is identified as 10x10 to D/C in AM. Pt's lockhart output is being monitored as clots are present.      Problem: Mobility  Goal: Risk for activity intolerance will decrease  Note:   Pt is SBA to and from the chair, maintained steady gait.

## 2019-10-31 NOTE — PROGRESS NOTES
Rec'd report from day shift RN. Assumed pt care. Assessment completed. AA&Ox4. Denies pain at this time. No s/s of discomfort or distress. Pt is sitting in a chair. Rosa draining to gravity, blood clots present, urine is yellow. Pitting edema to BLE noted. Bed in lowest position, bed locked, bed alarm on for safety, treaded socks in place, RN and CNA numbers provided, call light within reach.

## 2019-10-31 NOTE — PROGRESS NOTES
Care assumed from MARY Jimenez @ 0700. Pt A/Ox4. Pt resting in bed. Assessment complete. Pt denies pain. Meds administered per MAR. Bed lowered and locked. Call light within reach. Pt instructed to call for help. Bed alarm set. Hourly rounding complete. Continue to monitor.

## 2019-10-31 NOTE — DISCHARGE PLANNING
Anticipated Discharge Disposition: [facility]    Action: LSW met with pt at bedside to inform of tranfers to [facility]. Pt agreeable and signed COBRA. Pt requested LSW to contact and inform his niece, Irene.    LSW updated bedside RN.    Transport set up for 1300.    LSW informed MD and requested DC summary via tiger text.    LSW contacted Irene and informed of transfer.    Barriers to Discharge: None    Plan: Complete transfer packet after DC summary is complete.

## 2019-10-31 NOTE — DISCHARGE PLANNING
Anticipated Discharge Disposition: [facility]    Action: LSW informed by MD that pt is medically clear. LSW requested CCA to check on bed availability with [facility].    Barriers to Discharge: None    Plan: Await bed availability.

## 2019-10-31 NOTE — DISCHARGE INSTRUCTIONS
Discharge Instructions    Discharged to other by ambulance. Discharged via ambulance, hospital escort: Yes.  Special equipment needed: Not Applicable    Be sure to schedule a follow-up appointment with your primary care doctor or any specialists as instructed.     Discharge Plan:   Influenza Vaccine Indication: Indicated: 65 years and older  Influenza Vaccine Given - only chart on this line when given: Influenza Vaccine Given (See MAR)    I understand that a diet low in cholesterol, fat, and sodium is recommended for good health. Unless I have been given specific instructions below for another diet, I accept this instruction as my diet prescription.   Other diet: Renal    Special Instructions: None    · Is patient discharged on Warfarin / Coumadin?   No     Rosa Catheter Care, Adult  A Rosa catheter is a soft, flexible tube that is placed into the bladder to drain urine. A Rosa catheter may be inserted if:  · You leak urine or are not able to control when you urinate (urinary incontinence).  · You are not able to urinate when you need to (urinary retention).  · You had prostate surgery or surgery on the genitals.  · You have certain medical conditions, such as multiple sclerosis, dementia, or a spinal cord injury.  If you are going home with a Rosa catheter in place, follow the instructions below.  TAKING CARE OF THE CATHETER  1. Wash your hands with soap and water.  2. Using mild soap and warm water on a clean washcloth:  ¨ Clean the area on your body closest to the catheter insertion site using a circular motion, moving away from the catheter. Never wipe toward the catheter because this could sweep bacteria up into the urethra and cause infection.  ¨ Remove all traces of soap. Pat the area dry with a clean towel. For males, reposition the foreskin.  3. Attach the catheter to your leg so there is no tension on the catheter. Use adhesive tape or a leg strap. If you are using adhesive tape, remove any sticky  residue left behind by the previous tape you used.  4. Keep the drainage bag below the level of the bladder, but keep it off the floor.  5. Check throughout the day to be sure the catheter is working and urine is draining freely. Make sure the tubing does not become kinked.  6. Do not pull on the catheter or try to remove it. Pulling could damage internal tissues.  TAKING CARE OF THE DRAINAGE BAGS  You will be given two drainage bags to take home. One is a large overnight drainage bag, and the other is a smaller leg bag that fits underneath clothing. You may wear the overnight bag at any time, but you should never wear the smaller leg bag at night. Follow the instructions below for how to empty, change, and clean your drainage bags.  Emptying the Drainage Bag  You must empty your drainage bag when it is  -½ full or at least 2-3 times a day.  1. Wash your hands with soap and water.  2. Keep the drainage bag below your hips, below the level of your bladder. This stops urine from going back into the tubing and into your bladder.  3. Hold the dirty bag over the toilet or a clean container.  4. Open the pour spout at the bottom of the bag and empty the urine into the toilet or container. Do not let the pour spout touch the toilet, container, or any other surface. Doing so can place bacteria on the bag, which can cause an infection.  5. Clean the pour spout with a gauze pad or cotton ball that has rubbing alcohol on it.  6. Close the pour spout.  7. Attach the bag to your leg with adhesive tape or a leg strap.  8. Wash your hands well.  Changing the Drainage Bag  Change your drainage bag once a month or sooner if it starts to smell bad or look dirty. Below are steps to follow when changing the drainage bag.  2. Wash your hands with soap and water.  3. Pinch off the rubber catheter so that urine does not spill out.  4. Disconnect the catheter tube from the drainage tube at the connection valve. Do not let the tubes touch  any surface.  5. Clean the end of the catheter tube with an alcohol wipe. Use a different alcohol wipe to clean the end of the drainage tube.  6. Connect the catheter tube to the drainage tube of the clean drainage bag.  7. Attach the new bag to the leg with adhesive tape or a leg strap. Avoid attaching the new bag too tightly.  8. Wash your hands well.  Cleaning the Drainage Bag  1. Wash your hands with soap and water.  2. Wash the bag in warm, soapy water.  3. Rinse the bag thoroughly with warm water.  4. Fill the bag with a solution of white vinegar and water (1 cup vinegar to 1 qt warm water [.2 L vinegar to 1 L warm water]). Close the bag and soak it for 30 minutes in the solution.  5. Rinse the bag with warm water.  6. Hang the bag to dry with the pour spout open and hanging downward.  7. Store the clean bag (once it is dry) in a clean plastic bag.  8. Wash your hands well.  PREVENTING INFECTION  · Wash your hands before and after handling your catheter.  · Take showers daily and wash the area where the catheter enters your body. Do not take baths. Replace wet leg straps with dry ones, if this applies.  · Do not use powders, sprays, or lotions on the genital area. Only use creams, lotions, or ointments as directed by your caregiver.  · For females, wipe from front to back after each bowel movement.  · Drink enough fluids to keep your urine clear or pale yellow unless you have a fluid restriction.  · Do not let the drainage bag or tubing touch or lie on the floor.  · Wear cotton underwear to absorb moisture and to keep your .  SEEK MEDICAL CARE IF:   · Your urine is cloudy or smells unusually bad.  · Your catheter becomes clogged.  · You are not draining urine into the bag or your bladder feels full.  · Your catheter starts to leak.  SEEK IMMEDIATE MEDICAL CARE IF:   · You have pain, swelling, redness, or pus where the catheter enters the body.  · You have pain in the abdomen, legs, lower back, or  bladder.  · You have a fever.  · You see blood fill the catheter, or your urine is pink or red.  · You have nausea, vomiting, or chills.  · Your catheter gets pulled out.  MAKE SURE YOU:   · Understand these instructions.  · Will watch your condition.  · Will get help right away if you are not doing well or get worse.     This information is not intended to replace advice given to you by your health care provider. Make sure you discuss any questions you have with your health care provider.     Document Released: 12/18/2006 Document Revised: 05/04/2015 Document Reviewed: 12/09/2013  [employer] Interactive Patient Education ©2016 [employer]      Depression / Suicide Risk    As you are discharged from this [facility] facility, it is important to learn how to keep safe from harming yourself.    Recognize the warning signs:  · Abrupt changes in personality, positive or negative- including increase in energy   · Giving away possessions  · Change in eating patterns- significant weight changes-  positive or negative  · Change in sleeping patterns- unable to sleep or sleeping all the time   · Unwillingness or inability to communicate  · Depression  · Unusual sadness, discouragement and loneliness  · Talk of wanting to die  · Neglect of personal appearance   · Rebelliousness- reckless behavior  · Withdrawal from people/activities they love  · Confusion- inability to concentrate     If you or a loved one observes any of these behaviors or has concerns about self-harm, here's what you can do:  · Talk about it- your feelings and reasons for harming yourself  · Remove any means that you might use to hurt yourself (examples: pills, rope, extension cords, firearm)  · Get professional help from the community (Mental Health, Substance Abuse, psychological counseling)  · Do not be alone:Call your Safe Contact- someone whom you trust who will be there for you.  · Call your local CRISIS HOTLINE 268-1966 or 851-675-9492  · Call your  local Children's Mobile Crisis Response Team [place] (043) 547-1532 or www.[employer]ur[employer]  · Call the toll free National Suicide Prevention Hotlines   · National Suicide Prevention Lifeline 810-236-NGQU (0287)  · National Hope Line Network 800-SUICIDE (377-3450)    Follow-up with urology doctor in 2 weeks  Come back to the emergency for any new symptoms or bleeding from catheter  Xiao Hamilton M.D.

## 2019-10-31 NOTE — PROGRESS NOTES
Discharge instructions reviewed with pt at bedside. Report called to MARY [place] @ [facility]. IV removed. Medications returned to pt. Transport escorted pt off unit with all belongings @ 1300.

## 2019-10-31 NOTE — PROGRESS NOTES
Assumed care at 0700 after report received from night RN. Pt A/Ox4 (off on time occasionally, with memory issues). Pt complaining of blurred vision and poor peripheral vision, but per chart this has been ongoing since admit. PIV flushed and site WNL. 3-way lockhart present, CBI stopped and urine has been yellow with no signs of clots or blood all shift thus far. No complaints of pain from pt.

## 2019-10-31 NOTE — CARE PLAN
Problem: Communication  Goal: The ability to communicate needs accurately and effectively will improve  Outcome: PROGRESSING AS EXPECTED   Pt appropriately expresses needs to nursing staff.    Problem: Safety  Goal: Will remain free from injury  Outcome: PROGRESSING AS EXPECTED   Pt uses call light for assistance. Bed alarm set.

## 2019-11-01 LAB — C5 SERPL-MCNC: 18 MG/DL (ref 7–20)

## 2019-12-03 ENCOUNTER — HOSPITAL ENCOUNTER (OUTPATIENT)
Dept: RADIOLOGY | Facility: MEDICAL CENTER | Age: 84
End: 2019-12-03
Attending: INTERNAL MEDICINE
Payer: MEDICARE

## 2019-12-03 DIAGNOSIS — G93.41 METABOLIC ENCEPHALOPATHY: ICD-10-CM

## 2019-12-03 PROCEDURE — 70551 MRI BRAIN STEM W/O DYE: CPT

## 2020-01-22 ENCOUNTER — HOSPITAL ENCOUNTER (INPATIENT)
Facility: MEDICAL CENTER | Age: 85
LOS: 8 days | DRG: 698 | End: 2020-01-30
Attending: EMERGENCY MEDICINE | Admitting: INTERNAL MEDICINE
Payer: MEDICARE

## 2020-01-22 ENCOUNTER — APPOINTMENT (OUTPATIENT)
Dept: RADIOLOGY | Facility: MEDICAL CENTER | Age: 85
DRG: 698 | End: 2020-01-22
Attending: EMERGENCY MEDICINE
Payer: MEDICARE

## 2020-01-22 DIAGNOSIS — E03.9 ACQUIRED HYPOTHYROIDISM: ICD-10-CM

## 2020-01-22 DIAGNOSIS — R93.89 ABNORMAL MRI: ICD-10-CM

## 2020-01-22 DIAGNOSIS — N17.9 AKI (ACUTE KIDNEY INJURY) (HCC): ICD-10-CM

## 2020-01-22 DIAGNOSIS — E86.0 DEHYDRATION: ICD-10-CM

## 2020-01-22 DIAGNOSIS — N39.0 URINARY TRACT INFECTION ASSOCIATED WITH INDWELLING URETHRAL CATHETER, INITIAL ENCOUNTER (HCC): ICD-10-CM

## 2020-01-22 DIAGNOSIS — E83.52 HYPERCALCEMIA: ICD-10-CM

## 2020-01-22 DIAGNOSIS — G93.41 ACUTE METABOLIC ENCEPHALOPATHY: ICD-10-CM

## 2020-01-22 DIAGNOSIS — T83.511A URINARY TRACT INFECTION ASSOCIATED WITH INDWELLING URETHRAL CATHETER, INITIAL ENCOUNTER (HCC): ICD-10-CM

## 2020-01-22 PROBLEM — D72.829 LEUKOCYTOSIS: Status: ACTIVE | Noted: 2020-01-22

## 2020-01-22 PROBLEM — R31.9 URINARY TRACT INFECTION WITH HEMATURIA: Status: ACTIVE | Noted: 2020-01-22

## 2020-01-22 PROBLEM — G93.40 ACUTE ENCEPHALOPATHY: Status: ACTIVE | Noted: 2020-01-22

## 2020-01-22 LAB
ALBUMIN SERPL BCP-MCNC: 4.3 G/DL (ref 3.2–4.9)
ALP SERPL-CCNC: 62 U/L (ref 30–99)
ALT SERPL-CCNC: 13 U/L (ref 2–50)
AMMONIA PLAS-SCNC: 24 UMOL/L (ref 11–45)
ANION GAP SERPL CALC-SCNC: 14 MMOL/L (ref 0–11.9)
APPEARANCE UR: ABNORMAL
AST SERPL-CCNC: 22 U/L (ref 12–45)
BACTERIA #/AREA URNS HPF: ABNORMAL /HPF
BASOPHILS # BLD AUTO: 0.5 % (ref 0–1.8)
BASOPHILS # BLD: 0.06 K/UL (ref 0–0.12)
BILIRUB CONJ SERPL-MCNC: 0.3 MG/DL (ref 0.1–0.5)
BILIRUB INDIRECT SERPL-MCNC: 0.9 MG/DL (ref 0–1)
BILIRUB SERPL-MCNC: 1.2 MG/DL (ref 0.1–1.5)
BILIRUB UR QL STRIP.AUTO: NEGATIVE
BUN SERPL-MCNC: 48 MG/DL (ref 8–22)
CALCIUM SERPL-MCNC: 10.9 MG/DL (ref 8.5–10.5)
CHLORIDE SERPL-SCNC: 105 MMOL/L (ref 96–112)
CO2 SERPL-SCNC: 23 MMOL/L (ref 20–33)
COLOR UR: YELLOW
CREAT SERPL-MCNC: 2.02 MG/DL (ref 0.5–1.4)
EKG IMPRESSION: NORMAL
EOSINOPHIL # BLD AUTO: 0.01 K/UL (ref 0–0.51)
EOSINOPHIL NFR BLD: 0.1 % (ref 0–6.9)
ERYTHROCYTE [DISTWIDTH] IN BLOOD BY AUTOMATED COUNT: 54.4 FL (ref 35.9–50)
GLUCOSE SERPL-MCNC: 123 MG/DL (ref 65–99)
GLUCOSE UR STRIP.AUTO-MCNC: NEGATIVE MG/DL
HCT VFR BLD AUTO: 43.6 % (ref 42–52)
HGB BLD-MCNC: 13.7 G/DL (ref 14–18)
IMM GRANULOCYTES # BLD AUTO: 0.08 K/UL (ref 0–0.11)
IMM GRANULOCYTES NFR BLD AUTO: 0.6 % (ref 0–0.9)
KETONES UR STRIP.AUTO-MCNC: ABNORMAL MG/DL
LEUKOCYTE ESTERASE UR QL STRIP.AUTO: ABNORMAL
LYMPHOCYTES # BLD AUTO: 0.76 K/UL (ref 1–4.8)
LYMPHOCYTES NFR BLD: 5.8 % (ref 22–41)
MCH RBC QN AUTO: 30.6 PG (ref 27–33)
MCHC RBC AUTO-ENTMCNC: 31.4 G/DL (ref 33.7–35.3)
MCV RBC AUTO: 97.3 FL (ref 81.4–97.8)
MICRO URNS: ABNORMAL
MONOCYTES # BLD AUTO: 0.74 K/UL (ref 0–0.85)
MONOCYTES NFR BLD AUTO: 5.6 % (ref 0–13.4)
NEUTROPHILS # BLD AUTO: 11.55 K/UL (ref 1.82–7.42)
NEUTROPHILS NFR BLD: 87.4 % (ref 44–72)
NITRITE UR QL STRIP.AUTO: POSITIVE
NRBC # BLD AUTO: 0 K/UL
NRBC BLD-RTO: 0 /100 WBC
PH UR STRIP.AUTO: 6 [PH] (ref 5–8)
PLATELET # BLD AUTO: 212 K/UL (ref 164–446)
PMV BLD AUTO: 11 FL (ref 9–12.9)
POTASSIUM SERPL-SCNC: 4.1 MMOL/L (ref 3.6–5.5)
PROT SERPL-MCNC: 8.4 G/DL (ref 6–8.2)
PROT UR QL STRIP: 100 MG/DL
RBC # BLD AUTO: 4.48 M/UL (ref 4.7–6.1)
RBC # URNS HPF: ABNORMAL /HPF
RBC UR QL AUTO: ABNORMAL
SODIUM SERPL-SCNC: 142 MMOL/L (ref 135–145)
SP GR UR STRIP.AUTO: 1.02
T4 FREE SERPL-MCNC: 0.67 NG/DL (ref 0.53–1.43)
TSH SERPL DL<=0.005 MIU/L-ACNC: 33.77 UIU/ML (ref 0.38–5.33)
UROBILINOGEN UR STRIP.AUTO-MCNC: 0.2 MG/DL
VIT B12 SERPL-MCNC: 688 PG/ML (ref 211–911)
WBC # BLD AUTO: 13.2 K/UL (ref 4.8–10.8)
WBC #/AREA URNS HPF: ABNORMAL /HPF

## 2020-01-22 PROCEDURE — 93005 ELECTROCARDIOGRAM TRACING: CPT

## 2020-01-22 PROCEDURE — 36415 COLL VENOUS BLD VENIPUNCTURE: CPT

## 2020-01-22 PROCEDURE — 82607 VITAMIN B-12: CPT

## 2020-01-22 PROCEDURE — 770020 HCHG ROOM/CARE - TELE (206)

## 2020-01-22 PROCEDURE — 99285 EMERGENCY DEPT VISIT HI MDM: CPT

## 2020-01-22 PROCEDURE — 81001 URINALYSIS AUTO W/SCOPE: CPT

## 2020-01-22 PROCEDURE — 85025 COMPLETE CBC W/AUTO DIFF WBC: CPT

## 2020-01-22 PROCEDURE — 82140 ASSAY OF AMMONIA: CPT

## 2020-01-22 PROCEDURE — 87186 SC STD MICRODIL/AGAR DIL: CPT

## 2020-01-22 PROCEDURE — 99223 1ST HOSP IP/OBS HIGH 75: CPT | Mod: AI | Performed by: INTERNAL MEDICINE

## 2020-01-22 PROCEDURE — 87077 CULTURE AEROBIC IDENTIFY: CPT

## 2020-01-22 PROCEDURE — 700105 HCHG RX REV CODE 258: Performed by: INTERNAL MEDICINE

## 2020-01-22 PROCEDURE — 303105 HCHG CATHETER EXTRA

## 2020-01-22 PROCEDURE — A9270 NON-COVERED ITEM OR SERVICE: HCPCS | Performed by: INTERNAL MEDICINE

## 2020-01-22 PROCEDURE — 700105 HCHG RX REV CODE 258: Performed by: EMERGENCY MEDICINE

## 2020-01-22 PROCEDURE — 96374 THER/PROPH/DIAG INJ IV PUSH: CPT

## 2020-01-22 PROCEDURE — 87086 URINE CULTURE/COLONY COUNT: CPT

## 2020-01-22 PROCEDURE — 51702 INSERT TEMP BLADDER CATH: CPT

## 2020-01-22 PROCEDURE — 93005 ELECTROCARDIOGRAM TRACING: CPT | Performed by: EMERGENCY MEDICINE

## 2020-01-22 PROCEDURE — 80076 HEPATIC FUNCTION PANEL: CPT

## 2020-01-22 PROCEDURE — 84443 ASSAY THYROID STIM HORMONE: CPT

## 2020-01-22 PROCEDURE — 71045 X-RAY EXAM CHEST 1 VIEW: CPT

## 2020-01-22 PROCEDURE — 700111 HCHG RX REV CODE 636 W/ 250 OVERRIDE (IP): Performed by: INTERNAL MEDICINE

## 2020-01-22 PROCEDURE — 700102 HCHG RX REV CODE 250 W/ 637 OVERRIDE(OP): Performed by: INTERNAL MEDICINE

## 2020-01-22 PROCEDURE — 87040 BLOOD CULTURE FOR BACTERIA: CPT

## 2020-01-22 PROCEDURE — 80048 BASIC METABOLIC PNL TOTAL CA: CPT

## 2020-01-22 PROCEDURE — 84439 ASSAY OF FREE THYROXINE: CPT

## 2020-01-22 RX ORDER — ACETAMINOPHEN 325 MG/1
650 TABLET ORAL EVERY 6 HOURS PRN
Status: DISCONTINUED | OUTPATIENT
Start: 2020-01-22 | End: 2020-01-30 | Stop reason: HOSPADM

## 2020-01-22 RX ORDER — POLYETHYLENE GLYCOL 3350 17 G/17G
1 POWDER, FOR SOLUTION ORAL
Status: DISCONTINUED | OUTPATIENT
Start: 2020-01-22 | End: 2020-01-30 | Stop reason: HOSPADM

## 2020-01-22 RX ORDER — SODIUM CHLORIDE 9 MG/ML
1000 INJECTION, SOLUTION INTRAVENOUS ONCE
Status: COMPLETED | OUTPATIENT
Start: 2020-01-22 | End: 2020-01-22

## 2020-01-22 RX ORDER — ONDANSETRON 2 MG/ML
4 INJECTION INTRAMUSCULAR; INTRAVENOUS EVERY 4 HOURS PRN
Status: DISCONTINUED | OUTPATIENT
Start: 2020-01-22 | End: 2020-01-30 | Stop reason: HOSPADM

## 2020-01-22 RX ORDER — TAMSULOSIN HYDROCHLORIDE 0.4 MG/1
0.4 CAPSULE ORAL
Status: DISCONTINUED | OUTPATIENT
Start: 2020-01-22 | End: 2020-01-30 | Stop reason: HOSPADM

## 2020-01-22 RX ORDER — BISACODYL 10 MG
10 SUPPOSITORY, RECTAL RECTAL
Status: DISCONTINUED | OUTPATIENT
Start: 2020-01-22 | End: 2020-01-30 | Stop reason: HOSPADM

## 2020-01-22 RX ORDER — OMEPRAZOLE 20 MG/1
40 CAPSULE, DELAYED RELEASE ORAL DAILY
Status: DISCONTINUED | OUTPATIENT
Start: 2020-01-23 | End: 2020-01-30 | Stop reason: HOSPADM

## 2020-01-22 RX ORDER — AMOXICILLIN 250 MG
2 CAPSULE ORAL 2 TIMES DAILY
Status: DISCONTINUED | OUTPATIENT
Start: 2020-01-22 | End: 2020-01-30 | Stop reason: HOSPADM

## 2020-01-22 RX ORDER — LEVOTHYROXINE SODIUM 88 UG/1
88 TABLET ORAL
Status: DISCONTINUED | OUTPATIENT
Start: 2020-01-23 | End: 2020-01-30 | Stop reason: HOSPADM

## 2020-01-22 RX ORDER — SODIUM CHLORIDE 9 MG/ML
INJECTION, SOLUTION INTRAVENOUS CONTINUOUS
Status: DISCONTINUED | OUTPATIENT
Start: 2020-01-22 | End: 2020-01-24

## 2020-01-22 RX ORDER — ONDANSETRON 4 MG/1
4 TABLET, ORALLY DISINTEGRATING ORAL EVERY 4 HOURS PRN
Status: DISCONTINUED | OUTPATIENT
Start: 2020-01-22 | End: 2020-01-30 | Stop reason: HOSPADM

## 2020-01-22 RX ORDER — HEPARIN SODIUM 5000 [USP'U]/ML
5000 INJECTION, SOLUTION INTRAVENOUS; SUBCUTANEOUS EVERY 8 HOURS
Status: DISCONTINUED | OUTPATIENT
Start: 2020-01-22 | End: 2020-01-23

## 2020-01-22 RX ADMIN — TAMSULOSIN HYDROCHLORIDE 0.4 MG: 0.4 CAPSULE ORAL at 20:34

## 2020-01-22 RX ADMIN — SODIUM CHLORIDE 1000 ML: 9 INJECTION, SOLUTION INTRAVENOUS at 18:30

## 2020-01-22 RX ADMIN — HEPARIN SODIUM 5000 UNITS: 5000 INJECTION, SOLUTION INTRAVENOUS; SUBCUTANEOUS at 21:47

## 2020-01-22 RX ADMIN — CEFTRIAXONE SODIUM 1 G: 1 INJECTION, POWDER, FOR SOLUTION INTRAMUSCULAR; INTRAVENOUS at 20:33

## 2020-01-22 RX ADMIN — SODIUM CHLORIDE: 9 INJECTION, SOLUTION INTRAVENOUS at 20:34

## 2020-01-23 PROBLEM — T83.511A URINARY TRACT INFECTION ASSOCIATED WITH INDWELLING URETHRAL CATHETER (HCC): Status: ACTIVE | Noted: 2020-01-22

## 2020-01-23 LAB
ALBUMIN SERPL BCP-MCNC: 3.4 G/DL (ref 3.2–4.9)
ALBUMIN/GLOB SERPL: 1 G/DL
ALP SERPL-CCNC: 49 U/L (ref 30–99)
ALT SERPL-CCNC: 8 U/L (ref 2–50)
ANION GAP SERPL CALC-SCNC: 10 MMOL/L (ref 0–11.9)
AST SERPL-CCNC: 18 U/L (ref 12–45)
BILIRUB SERPL-MCNC: 0.8 MG/DL (ref 0.1–1.5)
BUN SERPL-MCNC: 42 MG/DL (ref 8–22)
CALCIUM SERPL-MCNC: 10 MG/DL (ref 8.5–10.5)
CHLORIDE SERPL-SCNC: 111 MMOL/L (ref 96–112)
CO2 SERPL-SCNC: 22 MMOL/L (ref 20–33)
CREAT SERPL-MCNC: 1.8 MG/DL (ref 0.5–1.4)
ERYTHROCYTE [DISTWIDTH] IN BLOOD BY AUTOMATED COUNT: 55.9 FL (ref 35.9–50)
GLOBULIN SER CALC-MCNC: 3.3 G/DL (ref 1.9–3.5)
GLUCOSE SERPL-MCNC: 109 MG/DL (ref 65–99)
HCT VFR BLD AUTO: 38.9 % (ref 42–52)
HGB BLD-MCNC: 12 G/DL (ref 14–18)
MCH RBC QN AUTO: 30.8 PG (ref 27–33)
MCHC RBC AUTO-ENTMCNC: 30.8 G/DL (ref 33.7–35.3)
MCV RBC AUTO: 99.7 FL (ref 81.4–97.8)
PLATELET # BLD AUTO: 172 K/UL (ref 164–446)
PMV BLD AUTO: 10.9 FL (ref 9–12.9)
POTASSIUM SERPL-SCNC: 4.5 MMOL/L (ref 3.6–5.5)
PROT SERPL-MCNC: 6.7 G/DL (ref 6–8.2)
RBC # BLD AUTO: 3.9 M/UL (ref 4.7–6.1)
SODIUM SERPL-SCNC: 143 MMOL/L (ref 135–145)
WBC # BLD AUTO: 12.1 K/UL (ref 4.8–10.8)

## 2020-01-23 PROCEDURE — 700102 HCHG RX REV CODE 250 W/ 637 OVERRIDE(OP): Performed by: INTERNAL MEDICINE

## 2020-01-23 PROCEDURE — 97165 OT EVAL LOW COMPLEX 30 MIN: CPT

## 2020-01-23 PROCEDURE — 80053 COMPREHEN METABOLIC PANEL: CPT

## 2020-01-23 PROCEDURE — 770020 HCHG ROOM/CARE - TELE (206)

## 2020-01-23 PROCEDURE — 36415 COLL VENOUS BLD VENIPUNCTURE: CPT

## 2020-01-23 PROCEDURE — 700105 HCHG RX REV CODE 258: Performed by: HOSPITALIST

## 2020-01-23 PROCEDURE — 99233 SBSQ HOSP IP/OBS HIGH 50: CPT | Performed by: HOSPITALIST

## 2020-01-23 PROCEDURE — 700111 HCHG RX REV CODE 636 W/ 250 OVERRIDE (IP): Performed by: HOSPITALIST

## 2020-01-23 PROCEDURE — A9270 NON-COVERED ITEM OR SERVICE: HCPCS | Performed by: INTERNAL MEDICINE

## 2020-01-23 PROCEDURE — 700111 HCHG RX REV CODE 636 W/ 250 OVERRIDE (IP): Performed by: INTERNAL MEDICINE

## 2020-01-23 PROCEDURE — 700105 HCHG RX REV CODE 258: Performed by: INTERNAL MEDICINE

## 2020-01-23 PROCEDURE — 97161 PT EVAL LOW COMPLEX 20 MIN: CPT

## 2020-01-23 PROCEDURE — 85027 COMPLETE CBC AUTOMATED: CPT

## 2020-01-23 RX ADMIN — SODIUM CHLORIDE: 9 INJECTION, SOLUTION INTRAVENOUS at 05:09

## 2020-01-23 RX ADMIN — PIPERACILLIN AND TAZOBACTAM 4.5 G: 4; .5 INJECTION, POWDER, LYOPHILIZED, FOR SOLUTION INTRAVENOUS; PARENTERAL at 20:05

## 2020-01-23 RX ADMIN — SODIUM CHLORIDE: 9 INJECTION, SOLUTION INTRAVENOUS at 16:41

## 2020-01-23 RX ADMIN — PIPERACILLIN AND TAZOBACTAM 4.5 G: 4; .5 INJECTION, POWDER, LYOPHILIZED, FOR SOLUTION INTRAVENOUS; PARENTERAL at 16:40

## 2020-01-23 RX ADMIN — LEVOTHYROXINE SODIUM 88 MCG: 88 TABLET ORAL at 05:06

## 2020-01-23 RX ADMIN — HEPARIN SODIUM 5000 UNITS: 5000 INJECTION, SOLUTION INTRAVENOUS; SUBCUTANEOUS at 05:06

## 2020-01-23 RX ADMIN — OMEPRAZOLE 40 MG: 20 CAPSULE, DELAYED RELEASE ORAL at 05:06

## 2020-01-23 RX ADMIN — TAMSULOSIN HYDROCHLORIDE 0.4 MG: 0.4 CAPSULE ORAL at 10:06

## 2020-01-23 ASSESSMENT — COGNITIVE AND FUNCTIONAL STATUS - GENERAL
EATING MEALS: TOTAL
WALKING IN HOSPITAL ROOM: A LITTLE
DAILY ACTIVITIY SCORE: 15
DRESSING REGULAR UPPER BODY CLOTHING: A LITTLE
SUGGESTED CMS G CODE MODIFIER DAILY ACTIVITY: CL
MOBILITY SCORE: 17
HELP NEEDED FOR BATHING: A LOT
SUGGESTED CMS G CODE MODIFIER MOBILITY: CK
DRESSING REGULAR LOWER BODY CLOTHING: A LOT
MOVING FROM LYING ON BACK TO SITTING ON SIDE OF FLAT BED: A LITTLE
EATING MEALS: A LITTLE
STANDING UP FROM CHAIR USING ARMS: A LITTLE
CLIMB 3 TO 5 STEPS WITH RAILING: A LOT
MOVING TO AND FROM BED TO CHAIR: A LITTLE
TOILETING: A LOT
TOILETING: A LOT
DRESSING REGULAR UPPER BODY CLOTHING: A LOT
DAILY ACTIVITIY SCORE: 11
PERSONAL GROOMING: A LITTLE
PERSONAL GROOMING: A LOT
HELP NEEDED FOR BATHING: A LOT
SUGGESTED CMS G CODE MODIFIER DAILY ACTIVITY: CK
TURNING FROM BACK TO SIDE WHILE IN FLAT BAD: A LITTLE
DRESSING REGULAR LOWER BODY CLOTHING: A LOT

## 2020-01-23 ASSESSMENT — LIFESTYLE VARIABLES
TOTAL SCORE: 0
CONSUMPTION TOTAL: NEGATIVE
HOW MANY TIMES IN THE PAST YEAR HAVE YOU HAD 5 OR MORE DRINKS IN A DAY: 0
ON A TYPICAL DAY WHEN YOU DRINK ALCOHOL HOW MANY DRINKS DO YOU HAVE: 0
HAVE YOU EVER FELT YOU SHOULD CUT DOWN ON YOUR DRINKING: NO
TOTAL SCORE: 0
EVER_SMOKED: YES
HAVE PEOPLE ANNOYED YOU BY CRITICIZING YOUR DRINKING: NO
ALCOHOL_USE: NO
EVER HAD A DRINK FIRST THING IN THE MORNING TO STEADY YOUR NERVES TO GET RID OF A HANGOVER: NO
AVERAGE NUMBER OF DAYS PER WEEK YOU HAVE A DRINK CONTAINING ALCOHOL: 0
EVER FELT BAD OR GUILTY ABOUT YOUR DRINKING: NO
TOTAL SCORE: 0

## 2020-01-23 ASSESSMENT — GAIT ASSESSMENTS
ASSISTIVE DEVICE: FRONT WHEEL WALKER
GAIT LEVEL OF ASSIST: MINIMAL ASSIST
DISTANCE (FEET): 100

## 2020-01-23 ASSESSMENT — PATIENT HEALTH QUESTIONNAIRE - PHQ9
SUM OF ALL RESPONSES TO PHQ9 QUESTIONS 1 AND 2: 0
1. LITTLE INTEREST OR PLEASURE IN DOING THINGS: NOT AT ALL
2. FEELING DOWN, DEPRESSED, IRRITABLE, OR HOPELESS: NOT AT ALL

## 2020-01-23 ASSESSMENT — ACTIVITIES OF DAILY LIVING (ADL): TOILETING: UNABLE TO DETERMINE AT THIS TIME

## 2020-01-23 NOTE — CARE PLAN
Problem: Communication  Goal: The ability to communicate needs accurately and effectively will improve  Outcome: PROGRESSING AS EXPECTED  Note:   Patient will remain free from falls. Patient educated on importance of calling for assistance when assistance needed. Patient educated on tread socks, belongings within reach, and use of call light. Fall risk wristband on, bed alarm on. Bed in low and locked position. Fall precautions in place.       Problem: Safety  Goal: Will remain free from falls  Outcome: PROGRESSING AS EXPECTED  Note:   Patient will remain free from falls. Patient educated on importance of calling for assistance when assistance needed. Patient educated on tread socks, belongings within reach, and use of call light. Fall risk wristband on, bed alarm on. Bed in low and locked position. Fall precautions in place.

## 2020-01-23 NOTE — PROGRESS NOTES
· 2 RN skin check complete with MARY Corona.  · Devices in place PIV, tele box, lockhart  · Skin assessed under devices yes  · Confirmed pressure ulcers found on n/a  · New potential pressure ulcers noted on n/a. Wound consult placed and wound reported n/a.   · The following interventions in place: q2h turns, waffle cushion, gray foam pads for O2, pillows in use for support and positioning, pillows in use to float heels, moisturizer at bedside    Assessment:  · Bilateral ears pink and blanching  · Bilateral elbows pink and blanching  · Sacrum red and blanching  · Bilateral feet/heels dry, calloused, boggy

## 2020-01-23 NOTE — PROGRESS NOTES
Report received. Patient A&O x 1, oriented to self. Reports no pain.  VS Stable. POC discussed, patient verbalized understanding. Belongings and bedside table within reach. Bed in low and locked positions. Fall precautions in place. Patient educated to call for assistance. Hourly rounding in place. No other needs at this time.

## 2020-01-23 NOTE — ED NOTES
Patient's old lockhart was removed to be replaced and he began to ooze blood from his urethra. MD made aware and he would now like a 3 way lockhart placed for CBI. VS remain stable. Awaiting 3 way lockhart and equipment from central supply

## 2020-01-23 NOTE — ASSESSMENT & PLAN NOTE
1/30 - Patient medically stable to discharge home with family. Patient's mentation isn't particularly improving, but there's very little to do medically left.  Family has arranged 24 hour home care, home health services approved, discharging  1/28-1/29 - unclear if it's improving.  Worse than baseline. Palliative Care team evaluating goals.  Patient is a DNR/DNI per his family.  - Previous Plans by Other Providers -  Likely secondary to UTI and dehydration  Overall improved  Avoid sedating agents frequent orientation

## 2020-01-23 NOTE — ED NOTES
Assumed care of pt. Pt oriented to self and situation. VSS. No signs of distress. Side rails up.

## 2020-01-23 NOTE — CARE PLAN
Problem: Communication  Goal: The ability to communicate needs accurately and effectively will improve  Outcome: NOT MET     Problem: Knowledge Deficit  Goal: Knowledge of disease process/condition, treatment plan, diagnostic tests, and medications will improve  Outcome: NOT MET     Problem: Safety  Goal: Will remain free from injury  Outcome: PROGRESSING AS EXPECTED

## 2020-01-23 NOTE — THERAPY
"Pt is confused.  Information obtained from nsg and chart review.  Apparently pt was found down at home w/ his wife present, by his [employer] company, making a delivery.  Wife also has dementia.  Today, he is able to mobilize w/ constant cues.  He does not follow commands.  There is no carry over or learning evident.  He can mobilize w/ fww and nsg.  At this time, he is not a therapy candidate, however PT will follow 1x/wk in hopes that his mentation will improve as his UTI resolves.    Physical Therapy Evaluation completed.   Bed Mobility:  Supine to Sit: Supervised  Transfers: Sit to Stand: Supervised  Gait: Level Of Assist: Minimal Assist with Front-Wheel Walker       Plan of Care: Will benefit from Physical Therapy 1 times per week  Discharge Recommendations: Equipment: Will Continue to Assess for Equipment Needs. Post-acute therapy   LTAC  See \"Rehab Therapy-Acute\" Patient Summary Report for complete documentation.     "

## 2020-01-23 NOTE — ED PROVIDER NOTES
"       ER Provider Note    CHIEF COMPLAINT  Chief Complaint   Patient presents with   • Fall       HPI   Efra Soto is a 84 y.o. male w/ pmhx of dementia, BPH, HLD, hypothyroidism and recent personality/behavioral changes who presents to the ED after being found on the floor of his home with the gas stove on. Niece provided history- states that earlier today an oxygen supply company arrived at the home to drop of supplemental oxygen. When they arrived patient was noted to be on the floor presumably having fallen out of his wheelchair.  It was also noted that the gas stove had been left on for an unknown amount of time. Fire department was called and patient was brought to the ED. Patient does not recall the event. Wife is present at time of interview as well but also has dementia and does not recall the events of the day. Patient was recently discharged from [facility] 3 days ago after being hospitalized back in October 2019 for encephalopathy of unknown cause. Niece reports that patient began having difficulty with speech, mentation and behavioral issues since 09/2019. After hospitalization in 10/209 mirlande states he has only become worse but admitted some days are better than others.     In the ED he has no complaints of pain (chest pain, abdominal pain, HA, BLE pain). He states he is having a hard time remembering what is going on- states \"it has been a lot.\".    Per chart review: admission 10/2019 had \"MRI and showed possible restricted diffusion in the bilateral parietal occipital region which carries a extensive differential including Creutzfeldt Thang, metabolic encephalopathy, or potential seizure activity, the patient was evaluated by a neurologist who suggested these imaging findings are secondary to an underlying metabolic encephalopathy due to acute kidney failure and uremia and the neurologist recommended follow-up with repeat MRI if there is no improvement. Also the patient had " "acute kidney injury with creatinine around 6 due to obstructive uropathy, was evaluated by urologist who recommended starting Flomax and CBI which showed some clot, and no cystoscopy was done and the urologist recommended to follow-up with the clinic in 2 weeks for voiding trial and removing the Rosa, however his urine got cleard and no more clots or blood, and his creatinine has improved significantly from 6.4 to1.5 with improving and his mental status.\"     Since this admission Niece reports she is unsure if he has had any follow up with the urologist. She does not know when the Rosa was last changed. Patient did have a repeat MRI 12/3/20 that showed not change from previous.     REVIEW OF SYSTEMS  Pertinent positives include: problems with mentation, speech and behavior as noted above.  Pertinent negatives include: cough, shortness of breath, nausea, vomiting, fever, chills, abdominal complaints, diarrhea, headache   All other systems are negative.     PAST MEDICAL HISTORY(PFS1,2)  Past Medical History:   Diagnosis Date   • Dementia (HCC)        FAMILY HISTORY  No family history on file.    SOCIAL HISTORY  Social History     Tobacco Use   • Smoking status: Former Smoker     Packs/day: 0.00     Start date: 9/25/1982   • Smokeless tobacco: Former User     Types: Chew   Substance Use Topics   • Alcohol use: Not Currently   • Drug use: Not Currently     Social History     Substance and Sexual Activity   Drug Use Not Currently       SURGICAL HISTORY  No past surgical history on file.    CURRENT MEDICATIONS  Home Medications    **Home medications have not yet been reviewed for this encounter**         ALLERGIES  No Known Allergies    PHYSICAL EXAM (2,8)  VITAL SIGNS: /91   Pulse 74   Temp 36.7 °C (98 °F) (Temporal)   Resp 18   Ht 1.702 m (5' 7\")   Wt 63.5 kg (140 lb)   SpO2 96%   BMI 21.93 kg/m²  Reviewed   Constitutional: Alert and awake, Np acute distress.  HENT: NCAT, MMM  Eyes: EOMI, " PERRLA  Cardiovascular: Rapidly alternating rate ranging from 41- 120s, No obvious murmurs, Cap refill <2 seconds  Respiratory: Normal work of breathing, No wheezes/rhonchi, saturating at 98% on 2L of supplemental oxygen  Gastrointestinal: RLQ and suprapubic tenderness, + BS, ND, no rebound or voluntary/involuntary guarding  Skin: Exposed areas of skin appear warm/dry/intact and without lesions  Genitourinary:  Rosa catheter in place with gross hematuria  Neurologic: Non focal, A&O x1, strenghth intact globally  Psychiatric: Appropriate affect.    DIFFERENTIAL DIAGNOSIS:  Worsening metabolic v toxi v hepatic encephalopthy v CVA v UTI v PNA    EKG  Sinus tachycardia, LAD    RADIOLOGY/PROCEDURES  DX-CHEST-PORTABLE (1 VIEW)   Final Result      1.  Bibasilar atelectasis.      2.  Mild cardiomegaly.          LABORATORY: Reviewed as below.  Results for orders placed or performed during the hospital encounter of 01/22/20   CBC WITH DIFFERENTIAL   Result Value Ref Range    WBC 13.2 (H) 4.8 - 10.8 K/uL    RBC 4.48 (L) 4.70 - 6.10 M/uL    Hemoglobin 13.7 (L) 14.0 - 18.0 g/dL    Hematocrit 43.6 42.0 - 52.0 %    MCV 97.3 81.4 - 97.8 fL    MCH 30.6 27.0 - 33.0 pg    MCHC 31.4 (L) 33.7 - 35.3 g/dL    RDW 54.4 (H) 35.9 - 50.0 fL    Platelet Count 212 164 - 446 K/uL    MPV 11.0 9.0 - 12.9 fL    Neutrophils-Polys 87.40 (H) 44.00 - 72.00 %    Lymphocytes 5.80 (L) 22.00 - 41.00 %    Monocytes 5.60 0.00 - 13.40 %    Eosinophils 0.10 0.00 - 6.90 %    Basophils 0.50 0.00 - 1.80 %    Immature Granulocytes 0.60 0.00 - 0.90 %    Nucleated RBC 0.00 /100 WBC    Neutrophils (Absolute) 11.55 (H) 1.82 - 7.42 K/uL    Lymphs (Absolute) 0.76 (L) 1.00 - 4.80 K/uL    Monos (Absolute) 0.74 0.00 - 0.85 K/uL    Eos (Absolute) 0.01 0.00 - 0.51 K/uL    Baso (Absolute) 0.06 0.00 - 0.12 K/uL    Immature Granulocytes (abs) 0.08 0.00 - 0.11 K/uL    NRBC (Absolute) 0.00 K/uL   Basic Metabolic Panel   Result Value Ref Range    Sodium 142 135 - 145 mmol/L     Potassium 4.1 3.6 - 5.5 mmol/L    Chloride 105 96 - 112 mmol/L    Co2 23 20 - 33 mmol/L    Glucose 123 (H) 65 - 99 mg/dL    Bun 48 (H) 8 - 22 mg/dL    Creatinine 2.02 (H) 0.50 - 1.40 mg/dL    Calcium 10.9 (H) 8.5 - 10.5 mg/dL    Anion Gap 14.0 (H) 0.0 - 11.9   URINALYSIS CULTURE, IF INDICATED   Result Value Ref Range    Color Yellow     Character Turbid (A)     Specific Gravity 1.025 <1.035    Ph 6.0 5.0 - 8.0    Glucose Negative Negative mg/dL    Ketones Trace (A) Negative mg/dL    Protein 100 (A) Negative mg/dL    Bilirubin Negative Negative    Urobilinogen, Urine 0.2 Negative    Nitrite Positive (A) Negative    Leukocyte Esterase Large (A) Negative    Occult Blood Large (A) Negative    Micro Urine Req Microscopic    AMMONIA   Result Value Ref Range    Ammonia 24 11 - 45 umol/L   HEPATIC FUNCTION PANEL   Result Value Ref Range    Alkaline Phosphatase 62 30 - 99 U/L    AST(SGOT) 22 12 - 45 U/L    ALT(SGPT) 13 2 - 50 U/L    Total Bilirubin 1.2 0.1 - 1.5 mg/dL    Direct Bilirubin 0.3 0.1 - 0.5 mg/dL    Indirect Bilirubin 0.9 0.0 - 1.0 mg/dL    Albumin 4.3 3.2 - 4.9 g/dL    Total Protein 8.4 (H) 6.0 - 8.2 g/dL   TSH   Result Value Ref Range    TSH 33.770 (H) 0.380 - 5.330 uIU/mL   VITAMIN B12   Result Value Ref Range    Vitamin B12 -True Cobalamin 688 211 - 911 pg/mL   ESTIMATED GFR   Result Value Ref Range    GFR If  38 (A) >60 mL/min/1.73 m 2    GFR If Non  32 (A) >60 mL/min/1.73 m 2   URINE MICROSCOPIC (W/UA)   Result Value Ref Range    WBC Packed (A) /hpf    RBC 5-10 (A) /hpf    Bacteria Moderate (A) None /hpf   EKG   Result Value Ref Range    Report       [facility] Emergency Dept.    Test Date:  2020  Pt Name:    ALLI GALLAGHER              Department: ER  MRN:        [id number]                      Room:       [facility]  Gender:     Male                         Technician: [id number]  :        1935                   Requested By:ER TRIAGE PROTOCOL  Order  #:    [id number]                    Reading MD: ROULA BRUNER MD    Measurements  Intervals                                Axis  Rate:       112                          P:          17  KY:         172                          QRS:        -44  QRSD:       84                           T:          23  QT:         340  QTc:        464    Interpretive Statements  SINUS TACHYCARDIA  MULTIPLE ATRIAL PREMATURE COMPLEXES  LEFT AXIS DEVIATION  LATE PRECORDIAL R/S TRANSITION  No previous ECG available for comparison  Electronically Signed On 1- 17:30:04 PST by ROULA BRUNER MD         INTERVENTIONS:  Medications   levothyroxine (SYNTHROID) tablet 88 mcg (has no administration in time range)   omeprazole (PRILOSEC) capsule 40 mg (has no administration in time range)   tamsulosin (FLOMAX) capsule 0.4 mg (0.4 mg Oral Given 1/22/20 2034)   senna-docusate (PERICOLACE or SENOKOT S) 8.6-50 MG per tablet 2 Tab (0 Tabs Oral Held 1/22/20 1915)     And   polyethylene glycol/lytes (MIRALAX) PACKET 1 Packet (has no administration in time range)     And   magnesium hydroxide (MILK OF MAGNESIA) suspension 30 mL (has no administration in time range)     And   bisacodyl (DULCOLAX) suppository 10 mg (has no administration in time range)   NS infusion ( Intravenous New Bag 1/22/20 2034)   heparin injection 5,000 Units (has no administration in time range)   acetaminophen (TYLENOL) tablet 650 mg (has no administration in time range)   ondansetron (ZOFRAN) syringe/vial injection 4 mg (has no administration in time range)   ondansetron (ZOFRAN ODT) dispertab 4 mg (has no administration in time range)   cefTRIAXone (ROCEPHIN) 1 g in  mL IVPB (1 g Intravenous New Bag 1/22/20 2033)   NS infusion 1,000 mL (1,000 mL Intravenous New Bag 1/22/20 1830)     Response: Tolerated well    COURSE & MEDICAL DECISION MAKING  84 year old male with complicated recent past medical history for encephalopathy of unknown origin. Recently discharge  from  3 days ago and found to have fallen out of wheelchair and left gas stove running at home. Lives at home with wife whom also has a dementia. He has a HR ranging from 41-120s in ED. He does not recall events preceding arrival in the ED. He is alert and oriented x1. History and pertinent medical information obtained from EMR and niece. Ddx includes but not limited to worsening encephalopathy (metabolic v hepatic v toxic) v Sepsis v CVA. Origin of symptoms not clear at this point but it is not clear. Does appear that the patient's encephalopathy is worsening, though general symptoms are not new. Recently discharged from [facility] reported to have fallen multiple times out of wheel chair at home and left gas stove running. Will need to be admitted, unable to care for self. Will obtain CBC, BMP, ammonia, hepatic panel UA and culture, Blood culture x2, CXR.  Will discuss plan with Niece after she returns to ED. Rosa catheter to be changed.    6:30 PM.  CBC with leukocytosis and left shift.  UA with packed WBC and moderate bacteria, TSH 33, ammonia wnl, hepatic panel wnl, chest x-ray with cardiomegaly and bibasilar atelectasis.  BMP with worsening BUN/creatinine.  Started on IV ceftriaxone.  And given 1 L bolus NS.  Discussed with the [facility] internal medicine hospital team for admission. Updated niece on plan of care in regards to recent lab results.  Patient admitted to the [facility] internal medicine hospital team, in guarded condition.    FINAL IMPRESSION  Urinary tract infection  Leukocytosis   Dehydration  Acute on chronic kidney disease  Metabolic encephalopathy      Electronically signed by: Inez Alvarado M.D., 1/22/2020 5:39 PM  History, physical, work-up and medical decision making performed in collaboration with Dr. Hernandez, attending ER physician, who is aware of the patient and agrees with the aforementioned documentation.    I independently evaluated the patient and repeated the important components of the  history and physical. I discussed the management with the resident. I have reviewed and agree with the pertinent clinical information above including history, exam, study findings, and recommendations.     Electronically signed by: José Luis Hernandez M.D., 1/26/2020 11:06 AM

## 2020-01-23 NOTE — ASSESSMENT & PLAN NOTE
1/28-1/30 - resolved - Rosa looks to have been ordered upon arrival for CBI.  - Previous Plans by Other Providers -  Urine culture Pseudomonas  Complete 5-day course of Zosyn

## 2020-01-23 NOTE — PROGRESS NOTES
Monitor Summary:    Rhythm: SR  Rate:   Ectopy: occasional-frequent PACs, rare PVCs  Intervals: .16/.10/.36

## 2020-01-23 NOTE — H&P
Hospital Medicine History & Physical Note    Date of Service  1/22/2020    Primary Care Physician  Vini Menjivar M.D.    Consultants  none    Code Status  full    Chief Complaint  confusion    History of Presenting Illness  84 y.o. male with past medical history of essential hypertension, hypothyroid, BPH, dyslipidemia who presented 1/22/2020 with confusion.  The patient was just recently discharged from rehab a few days ago.  He lives with his demented wife.  I had a long discussion with the niece Irene and she stated that he was coherent yesterday.  But today he is very confused.  He was found down at his home by oxygen transport staff who called 911 for the patient.  In the ER the patient was found to have urinary tract infection.  According to niece she is looking into hospice care which I told her the we can have hospice team to see him in the hospital.  He will be admitted for further management.    Review of Systems  Review of Systems   Unable to perform ROS: Mental acuity       Past Medical History   has a past medical history of Dementia (HCC).    Surgical History  Cannot be to obtain due to his mental acuity    Family History  Cannot be obtained due to his mental acuity    Social History   reports that he has quit smoking. He started smoking about 37 years ago. He smoked 0.00 packs per day. He has quit using smokeless tobacco.  His smokeless tobacco use included chew. He reports previous alcohol use. He reports previous drug use.    Allergies  No Known Allergies    Medications  Prior to Admission Medications   Prescriptions Last Dose Informant Patient Reported? Taking?   Cholecalciferol (VITAMIN D3 PO)  Patient Yes No   Sig: Take 1 Dose by mouth every day. Unknown OTC Strength.   allopurinol (ZYLOPRIM) 100 MG Tab  Patient Yes No   Sig: Take 100 mg by mouth every day.   fluocinonide (LIDEX) 0.05 % Cream   Yes No   Sig: Apply  to affected area(s) 2 times a day. Apply sparingly to both legs twice a day  as needed for rash   furosemide (LASIX) 20 MG Tab  Rx Bottle (For Med Information) Yes No   Sig: Take 20 mg by mouth every morning.   levothyroxine (SYNTHROID) 88 MCG Tab  Historical Yes No   Sig: Take 88 mcg by mouth Every morning on an empty stomach.   losartan (COZAAR) 100 MG Tab  Historical Yes No   Sig: Take 100 mg by mouth every day.   omeprazole (PRILOSEC) 20 MG delayed-release capsule  Patient Yes No   Sig: Take 40 mg by mouth every day. Indications: Gastroesophageal Reflux Disease   simvastatin (ZOCOR) 20 MG Tab  Historical Yes No   Sig: Take 20 mg by mouth every evening.   tamsulosin (FLOMAX) 0.4 MG capsule   No No   Sig: Take 1 Cap by mouth ONE-HALF HOUR AFTER BREAKFAST.   therapeutic multivitamin-minerals (THERAGRAN-M) Tab  Patient Yes No   Sig: Take 1 Tab by mouth every day.      Facility-Administered Medications: None       Physical Exam  Temp:  [36.7 °C (98 °F)] 36.7 °C (98 °F)  Pulse:  [] 109  Resp:  [18] 18  BP: (136-152)/(96-98) 136/98  SpO2:  [99 %] 99 %    Physical Exam  Vitals signs reviewed.   Constitutional:       General: He is not in acute distress.     Appearance: Normal appearance.   HENT:      Head: Normocephalic and atraumatic.      Nose: No congestion or rhinorrhea.   Eyes:      Extraocular Movements: Extraocular movements intact.      Pupils: Pupils are equal, round, and reactive to light.   Neck:      Musculoskeletal: Normal range of motion and neck supple.   Cardiovascular:      Rate and Rhythm: Normal rate and regular rhythm.      Pulses: Normal pulses.   Pulmonary:      Effort: Pulmonary effort is normal. No respiratory distress.      Breath sounds: Normal breath sounds.   Abdominal:      General: Bowel sounds are normal. There is no distension.      Palpations: Abdomen is soft.      Tenderness: There is no tenderness.   Musculoskeletal:         General: No swelling or tenderness.   Skin:     General: Skin is warm.      Findings: No erythema.   Neurological:      General: No  focal deficit present.      Mental Status: He is alert.         Laboratory:  Recent Labs     01/22/20  1755   WBC 13.2*   RBC 4.48*   HEMOGLOBIN 13.7*   HEMATOCRIT 43.6   MCV 97.3   MCH 30.6   MCHC 31.4*   RDW 54.4*   PLATELETCT 212   MPV 11.0     Recent Labs     01/22/20  1755   SODIUM 142   POTASSIUM 4.1   CHLORIDE 105   CO2 23   GLUCOSE 123*   BUN 48*   CREATININE 2.02*   CALCIUM 10.9*     Recent Labs     01/22/20  1755   ALTSGPT 13   ASTSGOT 22   ALKPHOSPHAT 62   TBILIRUBIN 1.2   DBILIRUBIN 0.3   GLUCOSE 123*         No results for input(s): NTPROBNP in the last 72 hours.      No results for input(s): TROPONINT in the last 72 hours.    Urinalysis:    No results found     Imaging:  DX-CHEST-PORTABLE (1 VIEW)   Final Result      1.  Bibasilar atelectasis.      2.  Mild cardiomegaly.            Assessment/Plan:  I anticipate this patient will require at least two midnights for appropriate medical management, necessitating inpatient admission.    Acute encephalopathy- (present on admission)  Assessment & Plan  Likely related to urinary tract infection  Started on IV ceftriaxone  Follow culture    Hypercalcemia- (present on admission)  Assessment & Plan  Likely related to dehydration    Dehydration- (present on admission)  Assessment & Plan  On IV fluid  Follow CMP closely    Urinary tract infection with hematuria- (present on admission)  Assessment & Plan  Started on IV ceftriaxone  Follow cultures    Leukocytosis- (present on admission)  Assessment & Plan  Related to above      VTE prophylaxis: heparin

## 2020-01-23 NOTE — ASSESSMENT & PLAN NOTE
1/28-1/30 - Resolved  - Previous Plans by Other Providers -  Resolved with IV hydration  Results from last 7 days   Lab Units 01/27/20  0327 01/26/20  0222   SODIUM 101 mmol/L 138 138   POTASSIUM 102 mmol/L 3.8 3.5*   CHLORIDE 103 mmol/L 109 106   CO2 104 mmol/L 23 24   GLUCOSE 112 mg/dL 79 86    mg/dL 22 23*   CREATININE 109 mg/dL 1.60* 1.87*   CALCIUM 105 mg/dL 8.9 9.0   ANION GAP ANION  6.0 8.0

## 2020-01-23 NOTE — ASSESSMENT & PLAN NOTE
1/28-1/30 - resolved  - Previous Plans by Other Providers -  encourage and monitor p.o. intake  Hold diuretics on hold he was severely dehydrated on presentation  Monitor fluid status

## 2020-01-23 NOTE — THERAPY
"Occupational Therapy Evaluation completed.   Functional Status: Pt is an 83 y/o male admitted after being found down in house. He was found to have a UTI. He has a hx of dementia. Today he was most limited by impaired cognition - unclear if this is his baseline or if the UTI is making it worse. Limited to no new learning today, and with impaired sequencing and object recognition. Supv bed mobility w/ extra time. Supv functional mobility with FWW however he would frequently make turns when a turn wasn't required and bump straight into a wall, unaware of what he was doing. Emilie grooming seated. Will follow 1x/week to monitor for improved cognition and ability to participate, otherwise would recommend 24/7 supv from family or long term placement of some sort where he could receive assistance with ADLs and mobility as needed.  Plan of Care: Will benefit from Occupational Therapy 1 times per week  Discharge Recommendations:  Equipment: Will Continue to Assess for Equipment Needs  See above    See \"Rehab Therapy-Acute\" Patient Summary Report for complete documentation.    "

## 2020-01-24 PROBLEM — E46 PROTEIN-CALORIE MALNUTRITION (HCC): Status: ACTIVE | Noted: 2020-01-24

## 2020-01-24 PROBLEM — E87.6 HYPOKALEMIA: Status: ACTIVE | Noted: 2020-01-24

## 2020-01-24 LAB
ALBUMIN SERPL BCP-MCNC: 3 G/DL (ref 3.2–4.9)
ALBUMIN/GLOB SERPL: 1.1 G/DL
ALP SERPL-CCNC: 40 U/L (ref 30–99)
ALT SERPL-CCNC: 7 U/L (ref 2–50)
ANION GAP SERPL CALC-SCNC: 8 MMOL/L (ref 0–11.9)
AST SERPL-CCNC: 15 U/L (ref 12–45)
BACTERIA UR CULT: ABNORMAL
BACTERIA UR CULT: ABNORMAL
BASOPHILS # BLD AUTO: 0.6 % (ref 0–1.8)
BASOPHILS # BLD: 0.03 K/UL (ref 0–0.12)
BILIRUB SERPL-MCNC: 0.6 MG/DL (ref 0.1–1.5)
BUN SERPL-MCNC: 35 MG/DL (ref 8–22)
CALCIUM SERPL-MCNC: 9.2 MG/DL (ref 8.5–10.5)
CHLORIDE SERPL-SCNC: 111 MMOL/L (ref 96–112)
CO2 SERPL-SCNC: 21 MMOL/L (ref 20–33)
CREAT SERPL-MCNC: 1.82 MG/DL (ref 0.5–1.4)
EOSINOPHIL # BLD AUTO: 0.2 K/UL (ref 0–0.51)
EOSINOPHIL NFR BLD: 3.9 % (ref 0–6.9)
ERYTHROCYTE [DISTWIDTH] IN BLOOD BY AUTOMATED COUNT: 52.2 FL (ref 35.9–50)
GLOBULIN SER CALC-MCNC: 2.8 G/DL (ref 1.9–3.5)
GLUCOSE SERPL-MCNC: 86 MG/DL (ref 65–99)
HCT VFR BLD AUTO: 31.4 % (ref 42–52)
HGB BLD-MCNC: 10.2 G/DL (ref 14–18)
IMM GRANULOCYTES # BLD AUTO: 0.04 K/UL (ref 0–0.11)
IMM GRANULOCYTES NFR BLD AUTO: 0.8 % (ref 0–0.9)
LYMPHOCYTES # BLD AUTO: 0.73 K/UL (ref 1–4.8)
LYMPHOCYTES NFR BLD: 14.3 % (ref 22–41)
MAGNESIUM SERPL-MCNC: 1.7 MG/DL (ref 1.5–2.5)
MCH RBC QN AUTO: 31.2 PG (ref 27–33)
MCHC RBC AUTO-ENTMCNC: 32.5 G/DL (ref 33.7–35.3)
MCV RBC AUTO: 96 FL (ref 81.4–97.8)
MONOCYTES # BLD AUTO: 0.41 K/UL (ref 0–0.85)
MONOCYTES NFR BLD AUTO: 8 % (ref 0–13.4)
NEUTROPHILS # BLD AUTO: 3.71 K/UL (ref 1.82–7.42)
NEUTROPHILS NFR BLD: 72.4 % (ref 44–72)
NRBC # BLD AUTO: 0 K/UL
NRBC BLD-RTO: 0 /100 WBC
PHOSPHATE SERPL-MCNC: 2.8 MG/DL (ref 2.5–4.5)
PLATELET # BLD AUTO: 142 K/UL (ref 164–446)
PMV BLD AUTO: 10.7 FL (ref 9–12.9)
POTASSIUM SERPL-SCNC: 3.5 MMOL/L (ref 3.6–5.5)
PROT SERPL-MCNC: 5.8 G/DL (ref 6–8.2)
RBC # BLD AUTO: 3.27 M/UL (ref 4.7–6.1)
SIGNIFICANT IND 70042: ABNORMAL
SITE SITE: ABNORMAL
SODIUM SERPL-SCNC: 140 MMOL/L (ref 135–145)
SOURCE SOURCE: ABNORMAL
WBC # BLD AUTO: 5.1 K/UL (ref 4.8–10.8)

## 2020-01-24 PROCEDURE — 700111 HCHG RX REV CODE 636 W/ 250 OVERRIDE (IP): Performed by: HOSPITALIST

## 2020-01-24 PROCEDURE — 700105 HCHG RX REV CODE 258

## 2020-01-24 PROCEDURE — 700102 HCHG RX REV CODE 250 W/ 637 OVERRIDE(OP): Performed by: INTERNAL MEDICINE

## 2020-01-24 PROCEDURE — 99232 SBSQ HOSP IP/OBS MODERATE 35: CPT | Performed by: HOSPITALIST

## 2020-01-24 PROCEDURE — A9270 NON-COVERED ITEM OR SERVICE: HCPCS | Performed by: INTERNAL MEDICINE

## 2020-01-24 PROCEDURE — A9270 NON-COVERED ITEM OR SERVICE: HCPCS | Performed by: HOSPITALIST

## 2020-01-24 PROCEDURE — 36415 COLL VENOUS BLD VENIPUNCTURE: CPT

## 2020-01-24 PROCEDURE — 80053 COMPREHEN METABOLIC PANEL: CPT

## 2020-01-24 PROCEDURE — 700102 HCHG RX REV CODE 250 W/ 637 OVERRIDE(OP): Performed by: HOSPITALIST

## 2020-01-24 PROCEDURE — 84100 ASSAY OF PHOSPHORUS: CPT

## 2020-01-24 PROCEDURE — 85025 COMPLETE CBC W/AUTO DIFF WBC: CPT

## 2020-01-24 PROCEDURE — 83735 ASSAY OF MAGNESIUM: CPT

## 2020-01-24 PROCEDURE — 770020 HCHG ROOM/CARE - TELE (206)

## 2020-01-24 PROCEDURE — 700105 HCHG RX REV CODE 258: Performed by: HOSPITALIST

## 2020-01-24 RX ORDER — POTASSIUM CHLORIDE 20 MEQ/1
40 TABLET, EXTENDED RELEASE ORAL ONCE
Status: COMPLETED | OUTPATIENT
Start: 2020-01-24 | End: 2020-01-24

## 2020-01-24 RX ORDER — SODIUM CHLORIDE 9 MG/ML
INJECTION, SOLUTION INTRAVENOUS
Status: COMPLETED
Start: 2020-01-24 | End: 2020-01-24

## 2020-01-24 RX ORDER — CARBOXYMETHYLCELLULOSE SODIUM 5 MG/ML
1 SOLUTION/ DROPS OPHTHALMIC
Status: DISCONTINUED | OUTPATIENT
Start: 2020-01-24 | End: 2020-01-30 | Stop reason: HOSPADM

## 2020-01-24 RX ORDER — MAGNESIUM SULFATE HEPTAHYDRATE 40 MG/ML
2 INJECTION, SOLUTION INTRAVENOUS ONCE
Status: COMPLETED | OUTPATIENT
Start: 2020-01-24 | End: 2020-01-24

## 2020-01-24 RX ADMIN — PIPERACILLIN AND TAZOBACTAM 4.5 G: 4; .5 INJECTION, POWDER, LYOPHILIZED, FOR SOLUTION INTRAVENOUS; PARENTERAL at 20:57

## 2020-01-24 RX ADMIN — PIPERACILLIN AND TAZOBACTAM 4.5 G: 4; .5 INJECTION, POWDER, LYOPHILIZED, FOR SOLUTION INTRAVENOUS; PARENTERAL at 05:02

## 2020-01-24 RX ADMIN — ENOXAPARIN SODIUM 30 MG: 100 INJECTION SUBCUTANEOUS at 10:29

## 2020-01-24 RX ADMIN — TAMSULOSIN HYDROCHLORIDE 0.4 MG: 0.4 CAPSULE ORAL at 10:29

## 2020-01-24 RX ADMIN — OMEPRAZOLE 40 MG: 20 CAPSULE, DELAYED RELEASE ORAL at 05:03

## 2020-01-24 RX ADMIN — LEVOTHYROXINE SODIUM 88 MCG: 88 TABLET ORAL at 05:03

## 2020-01-24 RX ADMIN — SENNOSIDES AND DOCUSATE SODIUM 2 TABLET: 8.6; 5 TABLET ORAL at 05:03

## 2020-01-24 RX ADMIN — MAGNESIUM SULFATE HEPTAHYDRATE 2 G: 40 INJECTION, SOLUTION INTRAVENOUS at 10:29

## 2020-01-24 RX ADMIN — SODIUM CHLORIDE: 9 INJECTION, SOLUTION INTRAVENOUS at 21:15

## 2020-01-24 RX ADMIN — SENNOSIDES AND DOCUSATE SODIUM 2 TABLET: 8.6; 5 TABLET ORAL at 17:17

## 2020-01-24 RX ADMIN — PIPERACILLIN AND TAZOBACTAM 4.5 G: 4; .5 INJECTION, POWDER, LYOPHILIZED, FOR SOLUTION INTRAVENOUS; PARENTERAL at 13:47

## 2020-01-24 RX ADMIN — POTASSIUM CHLORIDE 40 MEQ: 1500 TABLET, EXTENDED RELEASE ORAL at 10:29

## 2020-01-24 NOTE — ASSESSMENT & PLAN NOTE
1/30 - medically stable for discharge home to the care of his family with home health services  1/28-1/29 - improving  - Previous Plans by Other Providers -  In setting of chronic kidney disease stage III   Renal function improved          Results from last 7 days   Lab Units 01/27/20  0327 01/26/20  0222 01/25/20  0335 01/24/20  0224 01/23/20  0054 01/22/20  1755   SODIUM 101 mmol/L 138 138 138 140 143 142    mg/dL 22 23* 23* 35* 42* 48*   CREATININE 109 mg/dL 1.60* 1.87* 1.66* 1.82* 1.80* 2.02*   IF TYLER AMER 768095 mL/min/1.73 m 2 50* 42* 48* 43* 44* 38*   IF NON  AMER 109GFRB mL/min/1.73 m 2 41* 35* 40* 36* 36* 32*   MAGNESIUM 561 mg/dL  --   --  2.1 1.7  --   --

## 2020-01-24 NOTE — PROGRESS NOTES
Monitor Summary:    Rhythm: SR  Rate: 65-89  Ectopy: occasional PAC, rare PVC  Intervals: .20/.08/.36

## 2020-01-24 NOTE — DOCUMENTATION QUERY
[facility]                                                                       Query Response Note      PATIENT:               ALLI GALLAGHER  ACCT #:                  [id number]  MRN:                     [id number]  :                      1935  ADMIT DATE:       2020 4:30 PM  DISCH DATE:          RESPONDING  PROVIDER #:        [id number]           QUERY TEXT:    Unspecified encephalopathy is documented in the H&P and Progress Notes.  Metabolic encephalopathy is documented in the ED Provider Note.  Can you clarify if you agree with the assessment of metabolic encephalopathy?    NOTE:  If an appropriate response is not listed below, please respond with a new note.    The patient's Clinical Indicators include:  Acute encephalopathy, likely secondary to UTI and dehydration  Per ED Provider Note: metabolic encephalopathy  Risk Factors: UTI, dehydration, history of dementia, age  Treatment: IVF, zosyn, rocephin  Options provided:   -- Agree with assessment of metabolic encephalopathy   -- Disagree with assessment of metabolic encephalopathy, please specify type of encephalopathy if able   -- Unable to determine      Query created by: Shante Moreira on 2020 10:07 AM    RESPONSE TEXT:    Agree with assessment of metabolic encephalopathy          Electronically signed by:  JUAN CARLOS CHATMAN MD 2020 12:02 PM

## 2020-01-24 NOTE — PROGRESS NOTES
Hospital Medicine Daily Progress Note    Date of Service  1/23/2020    Chief Complaint  84 y.o. male admitted 1/22/2020 with acute encephalopathy secondary to UTI and dehydration    Hospital Course          Interval Problem Update  Patient is confused oriented to self  Mild bleeding around catheter site  Denies pain    Consultants/Specialty  none    Code Status  Full code    Disposition  To be determined    Review of Systems  Review of Systems   Unable to perform ROS: Mental status change        Physical Exam  Temp:  [36.1 °C (97 °F)-37.1 °C (98.7 °F)] 36.7 °C (98 °F)  Pulse:  [] 83  Resp:  [16-18] 17  BP: (126-152)/() 142/89  SpO2:  [95 %-100 %] 98 %    Physical Exam  Vitals signs and nursing note reviewed.   Constitutional:       Appearance: He is well-developed. He is not diaphoretic.   HENT:      Head: Normocephalic and atraumatic.      Mouth/Throat:      Pharynx: No oropharyngeal exudate.   Eyes:      General: No scleral icterus.        Right eye: No discharge.         Left eye: No discharge.      Conjunctiva/sclera: Conjunctivae normal.      Pupils: Pupils are equal, round, and reactive to light.   Neck:      Musculoskeletal: Neck supple.      Vascular: No JVD.      Trachea: No tracheal deviation.   Cardiovascular:      Rate and Rhythm: Normal rate and regular rhythm.      Heart sounds: No murmur. No friction rub. No gallop.    Pulmonary:      Effort: Pulmonary effort is normal. No respiratory distress.      Breath sounds: Normal breath sounds. No stridor. No wheezing.   Chest:      Chest wall: No tenderness.   Abdominal:      General: Bowel sounds are normal. There is no distension.      Palpations: Abdomen is soft.      Tenderness: There is no tenderness. There is no rebound.   Musculoskeletal:         General: No tenderness.   Skin:     General: Skin is warm and dry.      Nails: There is no clubbing.     Neurological:      General: No focal deficit present.      Mental Status: He is alert.       Cranial Nerves: No cranial nerve deficit.      Motor: No abnormal muscle tone.      Comments: Oriented to self only     Psychiatric:         Cognition and Memory: He exhibits impaired recent memory.         Judgment: Judgment is impulsive.         Fluids    Intake/Output Summary (Last 24 hours) at 1/23/2020 1601  Last data filed at 1/23/2020 0900  Gross per 24 hour   Intake 240 ml   Output 2600 ml   Net -2360 ml       Laboratory  Recent Labs     01/22/20  1755 01/23/20  0054   WBC 13.2* 12.1*   RBC 4.48* 3.90*   HEMOGLOBIN 13.7* 12.0*   HEMATOCRIT 43.6 38.9*   MCV 97.3 99.7*   MCH 30.6 30.8   MCHC 31.4* 30.8*   RDW 54.4* 55.9*   PLATELETCT 212 172   MPV 11.0 10.9     Recent Labs     01/22/20 1755 01/23/20  0054   SODIUM 142 143   POTASSIUM 4.1 4.5   CHLORIDE 105 111   CO2 23 22   GLUCOSE 123* 109*   BUN 48* 42*   CREATININE 2.02* 1.80*   CALCIUM 10.9* 10.0                   Imaging  DX-CHEST-PORTABLE (1 VIEW)   Final Result      1.  Bibasilar atelectasis.      2.  Mild cardiomegaly.           Assessment/Plan  Acute encephalopathy- (present on admission)  Assessment & Plan  Likely secondary to UTI and dehydration    Continue IV fluids and antibiotics  Frequent orientation and avoid sedating agents    JAIRON (acute kidney injury) (HCC)  Assessment & Plan  In setting of chronic kidney disease stage III baseline serum creatinine of 1.5    Continue IV fluids and monitor  Avoid nephrotoxic agents    Hypercalcemia- (present on admission)  Assessment & Plan  Improved with hydration    Dehydration- (present on admission)  Assessment & Plan  Continue IV fluids and monitor intake and output    Urinary tract infection associated with indwelling urethral catheter (HCC)- (present on admission)  Assessment & Plan  Urine culture Pseudomonas    I will change ceftriaxone to IV Zosyn  Follow-up on final sensitivities    Hypothyroidism- (present on admission)  Assessment & Plan  Continue levothyroxine    TSH 33.7 query compliance will  reevaluate when mental status improved if  the patient has been compliant we will increase levothyroxine dose       VTE prophylaxis: SCD

## 2020-01-24 NOTE — CARE PLAN
Problem: Nutritional:  Goal: Achieve adequate nutritional intake  Description  Patient will consume 50% of meals/supplements + snacks   Outcome: NOT MET

## 2020-01-24 NOTE — PROGRESS NOTES
Assumed care of pt, he is asleep in bed. Discussed POC with NOC RN at bedside. Bed is locked in lowest position and call light/personal belongings are within reach.

## 2020-01-24 NOTE — CARE PLAN
Problem: Communication  Goal: The ability to communicate needs accurately and effectively will improve  Outcome: NOT MET     Problem: Bowel/Gastric:  Goal: Normal bowel function is maintained or improved  Outcome: NOT MET     Problem: Knowledge Deficit  Goal: Knowledge of disease process/condition, treatment plan, diagnostic tests, and medications will improve  Outcome: NOT MET     Problem: Safety  Goal: Will remain free from injury  Outcome: PROGRESSING AS EXPECTED     Problem: Infection  Goal: Will remain free from infection  Outcome: PROGRESSING AS EXPECTED     Problem: Respiratory:  Goal: Respiratory status will improve  Outcome: PROGRESSING AS EXPECTED

## 2020-01-24 NOTE — ASSESSMENT & PLAN NOTE
1/28-1/30 - cont  - Previous Plans by Other Providers -  Continue levothyroxine  TSH was elevated but free T4 was normal continue current dose recheck TFTs in a few weeks when recovered from acute illness and after documenting compliance with medical therapy

## 2020-01-25 PROBLEM — D72.829 LEUKOCYTOSIS: Status: RESOLVED | Noted: 2020-01-22 | Resolved: 2020-01-25

## 2020-01-25 PROBLEM — E83.52 HYPERCALCEMIA: Status: RESOLVED | Noted: 2020-01-22 | Resolved: 2020-01-25

## 2020-01-25 LAB
ANION GAP SERPL CALC-SCNC: 12 MMOL/L (ref 0–11.9)
BASOPHILS # BLD AUTO: 0.6 % (ref 0–1.8)
BASOPHILS # BLD: 0.03 K/UL (ref 0–0.12)
BUN SERPL-MCNC: 23 MG/DL (ref 8–22)
CALCIUM SERPL-MCNC: 9.6 MG/DL (ref 8.5–10.5)
CHLORIDE SERPL-SCNC: 106 MMOL/L (ref 96–112)
CO2 SERPL-SCNC: 20 MMOL/L (ref 20–33)
CREAT SERPL-MCNC: 1.66 MG/DL (ref 0.5–1.4)
EOSINOPHIL # BLD AUTO: 0.26 K/UL (ref 0–0.51)
EOSINOPHIL NFR BLD: 5.4 % (ref 0–6.9)
ERYTHROCYTE [DISTWIDTH] IN BLOOD BY AUTOMATED COUNT: 51.8 FL (ref 35.9–50)
GLUCOSE SERPL-MCNC: 90 MG/DL (ref 65–99)
HCT VFR BLD AUTO: 34.1 % (ref 42–52)
HGB BLD-MCNC: 11.1 G/DL (ref 14–18)
IMM GRANULOCYTES # BLD AUTO: 0.02 K/UL (ref 0–0.11)
IMM GRANULOCYTES NFR BLD AUTO: 0.4 % (ref 0–0.9)
LYMPHOCYTES # BLD AUTO: 0.55 K/UL (ref 1–4.8)
LYMPHOCYTES NFR BLD: 11.4 % (ref 22–41)
MAGNESIUM SERPL-MCNC: 2.1 MG/DL (ref 1.5–2.5)
MCH RBC QN AUTO: 31.4 PG (ref 27–33)
MCHC RBC AUTO-ENTMCNC: 32.6 G/DL (ref 33.7–35.3)
MCV RBC AUTO: 96.6 FL (ref 81.4–97.8)
MONOCYTES # BLD AUTO: 0.42 K/UL (ref 0–0.85)
MONOCYTES NFR BLD AUTO: 8.7 % (ref 0–13.4)
NEUTROPHILS # BLD AUTO: 3.54 K/UL (ref 1.82–7.42)
NEUTROPHILS NFR BLD: 73.5 % (ref 44–72)
NRBC # BLD AUTO: 0 K/UL
NRBC BLD-RTO: 0 /100 WBC
PLATELET # BLD AUTO: 156 K/UL (ref 164–446)
PMV BLD AUTO: 10.5 FL (ref 9–12.9)
POTASSIUM SERPL-SCNC: 3.5 MMOL/L (ref 3.6–5.5)
RBC # BLD AUTO: 3.53 M/UL (ref 4.7–6.1)
SODIUM SERPL-SCNC: 138 MMOL/L (ref 135–145)
WBC # BLD AUTO: 4.8 K/UL (ref 4.8–10.8)

## 2020-01-25 PROCEDURE — 99232 SBSQ HOSP IP/OBS MODERATE 35: CPT | Performed by: HOSPITALIST

## 2020-01-25 PROCEDURE — 83735 ASSAY OF MAGNESIUM: CPT

## 2020-01-25 PROCEDURE — 80048 BASIC METABOLIC PNL TOTAL CA: CPT

## 2020-01-25 PROCEDURE — 85025 COMPLETE CBC W/AUTO DIFF WBC: CPT

## 2020-01-25 PROCEDURE — A9270 NON-COVERED ITEM OR SERVICE: HCPCS | Performed by: HOSPITALIST

## 2020-01-25 PROCEDURE — A9270 NON-COVERED ITEM OR SERVICE: HCPCS | Performed by: INTERNAL MEDICINE

## 2020-01-25 PROCEDURE — 700105 HCHG RX REV CODE 258: Performed by: HOSPITALIST

## 2020-01-25 PROCEDURE — 770020 HCHG ROOM/CARE - TELE (206)

## 2020-01-25 PROCEDURE — 87040 BLOOD CULTURE FOR BACTERIA: CPT

## 2020-01-25 PROCEDURE — 700102 HCHG RX REV CODE 250 W/ 637 OVERRIDE(OP): Performed by: INTERNAL MEDICINE

## 2020-01-25 PROCEDURE — 700111 HCHG RX REV CODE 636 W/ 250 OVERRIDE (IP): Performed by: HOSPITALIST

## 2020-01-25 PROCEDURE — 700102 HCHG RX REV CODE 250 W/ 637 OVERRIDE(OP): Performed by: HOSPITALIST

## 2020-01-25 PROCEDURE — 36415 COLL VENOUS BLD VENIPUNCTURE: CPT

## 2020-01-25 RX ORDER — SIMVASTATIN 20 MG
20 TABLET ORAL NIGHTLY
Status: DISCONTINUED | OUTPATIENT
Start: 2020-01-25 | End: 2020-01-30 | Stop reason: HOSPADM

## 2020-01-25 RX ORDER — ALLOPURINOL 100 MG/1
100 TABLET ORAL DAILY
Status: DISCONTINUED | OUTPATIENT
Start: 2020-01-25 | End: 2020-01-30 | Stop reason: HOSPADM

## 2020-01-25 RX ORDER — POTASSIUM CHLORIDE 20 MEQ/1
20 TABLET, EXTENDED RELEASE ORAL ONCE
Status: COMPLETED | OUTPATIENT
Start: 2020-01-25 | End: 2020-01-25

## 2020-01-25 RX ADMIN — OMEPRAZOLE 40 MG: 20 CAPSULE, DELAYED RELEASE ORAL at 04:36

## 2020-01-25 RX ADMIN — PIPERACILLIN AND TAZOBACTAM 4.5 G: 4; .5 INJECTION, POWDER, LYOPHILIZED, FOR SOLUTION INTRAVENOUS; PARENTERAL at 04:36

## 2020-01-25 RX ADMIN — PIPERACILLIN AND TAZOBACTAM 4.5 G: 4; .5 INJECTION, POWDER, LYOPHILIZED, FOR SOLUTION INTRAVENOUS; PARENTERAL at 19:38

## 2020-01-25 RX ADMIN — SIMVASTATIN 20 MG: 20 TABLET, FILM COATED ORAL at 19:38

## 2020-01-25 RX ADMIN — SENNOSIDES AND DOCUSATE SODIUM 2 TABLET: 8.6; 5 TABLET ORAL at 04:36

## 2020-01-25 RX ADMIN — TAMSULOSIN HYDROCHLORIDE 0.4 MG: 0.4 CAPSULE ORAL at 09:10

## 2020-01-25 RX ADMIN — ENOXAPARIN SODIUM 30 MG: 100 INJECTION SUBCUTANEOUS at 04:36

## 2020-01-25 RX ADMIN — ALLOPURINOL 100 MG: 100 TABLET ORAL at 18:01

## 2020-01-25 RX ADMIN — LEVOTHYROXINE SODIUM 88 MCG: 88 TABLET ORAL at 04:36

## 2020-01-25 RX ADMIN — ACETAMINOPHEN 650 MG: 325 TABLET, FILM COATED ORAL at 17:03

## 2020-01-25 RX ADMIN — POTASSIUM CHLORIDE 20 MEQ: 1500 TABLET, EXTENDED RELEASE ORAL at 12:27

## 2020-01-25 RX ADMIN — PIPERACILLIN AND TAZOBACTAM 4.5 G: 4; .5 INJECTION, POWDER, LYOPHILIZED, FOR SOLUTION INTRAVENOUS; PARENTERAL at 12:28

## 2020-01-25 RX ADMIN — SENNOSIDES AND DOCUSATE SODIUM 2 TABLET: 8.6; 5 TABLET ORAL at 18:01

## 2020-01-25 NOTE — PROGRESS NOTES
Monitor Summary  Rhythm: SR  Rate: 71-81  Ectopy: Rare PVC's; occasional PAC's.   .20 / .08 / .40

## 2020-01-25 NOTE — ASSESSMENT & PLAN NOTE
1/28-1/30 - waxes and wanes    Results from last 7 days   Lab Units 01/27/20  0327 01/26/20  0222 01/25/20  0335 01/24/20 0224 01/23/20  0054 01/22/20  1755   POTASSIUM 102 mmol/L 3.8 3.5* 3.5* 3.5* 4.5 4.1   MAGNESIUM 561 mg/dL  --   --  2.1 1.7  --   --

## 2020-01-25 NOTE — PROGRESS NOTES
Hospital Medicine Daily Progress Note    Date of Service  1/24/2020    Chief Complaint  84 y.o. male admitted 1/22/2020 with acute encephalopathy secondary to UTI and dehydration    Hospital Course          Interval Problem Update    Complains of dry eyes  No further bleeding from catheter site  Denies pain  Oriented to self and place  Consultants/Specialty  none    Code Status  Full code    Disposition  To be determined    Review of Systems  Review of Systems   Unable to perform ROS: Mental acuity        Physical Exam  Temp:  [36.4 °C (97.6 °F)-37.2 °C (98.9 °F)] 36.9 °C (98.4 °F)  Pulse:  [68-85] 74  Resp:  [16-20] 20  BP: (100-134)/(63-89) 122/73  SpO2:  [92 %-99 %] 99 %    Physical Exam  Vitals signs and nursing note reviewed.   Constitutional:       Appearance: He is well-developed. He is not diaphoretic.   HENT:      Head: Normocephalic and atraumatic.      Mouth/Throat:      Pharynx: No oropharyngeal exudate.   Eyes:      General: No scleral icterus.        Right eye: No discharge.         Left eye: No discharge.      Conjunctiva/sclera: Conjunctivae normal.      Pupils: Pupils are equal, round, and reactive to light.   Neck:      Musculoskeletal: Neck supple.      Vascular: No JVD.      Trachea: No tracheal deviation.   Cardiovascular:      Rate and Rhythm: Normal rate and regular rhythm.      Heart sounds: No murmur. No friction rub. No gallop.    Pulmonary:      Effort: Pulmonary effort is normal. No respiratory distress.      Breath sounds: No stridor. Decreased breath sounds present. No wheezing.   Chest:      Chest wall: No tenderness.   Abdominal:      General: Bowel sounds are normal. There is no distension.      Palpations: Abdomen is soft.      Tenderness: There is no tenderness. There is no rebound.   Genitourinary:     Comments: Rosa in place with no bleeding noted  Musculoskeletal:         General: Swelling present. No tenderness.   Skin:     General: Skin is warm and dry.      Nails: There is  no clubbing.     Neurological:      Mental Status: He is alert.      Cranial Nerves: No cranial nerve deficit.      Motor: No abnormal muscle tone.      Comments: Oriented to self and place   Psychiatric:         Speech: Speech is delayed.         Cognition and Memory: He exhibits impaired recent memory.         Fluids    Intake/Output Summary (Last 24 hours) at 1/24/2020 1601  Last data filed at 1/24/2020 0902  Gross per 24 hour   Intake --   Output 725 ml   Net -725 ml       Laboratory  Recent Labs     01/22/20 1755 01/23/20 0054 01/24/20 0224   WBC 13.2* 12.1* 5.1   RBC 4.48* 3.90* 3.27*   HEMOGLOBIN 13.7* 12.0* 10.2*   HEMATOCRIT 43.6 38.9* 31.4*   MCV 97.3 99.7* 96.0   MCH 30.6 30.8 31.2   MCHC 31.4* 30.8* 32.5*   RDW 54.4* 55.9* 52.2*   PLATELETCT 212 172 142*   MPV 11.0 10.9 10.7     Recent Labs     01/22/20 1755 01/23/20 0054 01/24/20 0224   SODIUM 142 143 140   POTASSIUM 4.1 4.5 3.5*   CHLORIDE 105 111 111   CO2 23 22 21   GLUCOSE 123* 109* 86   BUN 48* 42* 35*   CREATININE 2.02* 1.80* 1.82*   CALCIUM 10.9* 10.0 9.2                   Imaging  DX-CHEST-PORTABLE (1 VIEW)   Final Result      1.  Bibasilar atelectasis.      2.  Mild cardiomegaly.           Assessment/Plan  Acute encephalopathy- (present on admission)  Assessment & Plan  Likely secondary to UTI and dehydration    Slowly improving  Continue antibiotics  Avoid sedating agents    JAIRON (acute kidney injury) (HCC)  Assessment & Plan  In setting of chronic kidney disease stage III     Monitor renal function avoid nephrotoxic agent    Hypokalemia  Assessment & Plan  Hypomagnesemia     replete and monitor    Hypercalcemia- (present on admission)  Assessment & Plan  Resolved with IV hydration    Dehydration- (present on admission)  Assessment & Plan  Improved with hydration    Urinary tract infection associated with indwelling urethral catheter (HCC)- (present on admission)  Assessment & Plan  Urine culture Pseudomonas    Continue IV Zosyn  Plan for  5-day course of antibiotic    Leukocytosis- (present on admission)  Assessment & Plan  Improved    Hypothyroidism- (present on admission)  Assessment & Plan  Continue levothyroxine    Will need repeat TFTs in a few weeks       VTE prophylaxis: Lovenox

## 2020-01-25 NOTE — ASSESSMENT & PLAN NOTE
1/28-1/30 - resolved  - Previous Plans by Other Providers -  Improved  Results from last 7 days   Lab Units 01/27/20  0327 01/25/20  0335 01/24/20  0224   WBC 1501 K/uL 5.4 4.8 5.1   PLATELET COUNT 1518 K/uL 141* 156* 142*

## 2020-01-25 NOTE — PROGRESS NOTES
Hospital Medicine Daily Progress Note    Date of Service  1/25/2020    Chief Complaint  84 y.o. male admitted 1/22/2020 with acute encephalopathy secondary to UTI and dehydration    Hospital Course          Interval Problem Update    Patient is asleep arousable  Oriented to self  Denies pain  Long-term friend at bedside reports that patient and wife live by themselves they have a niece who checks on them and is their only next of kin as far as he knows        Consultants/Specialty  none    Code Status  Full code    Disposition  To be determined    Review of Systems  Review of Systems   Unable to perform ROS: Mental acuity        Physical Exam  Temp:  [36.4 °C (97.6 °F)-37.9 °C (100.2 °F)] 37.8 °C (100 °F)  Pulse:  [] 100  Resp:  [14-18] 16  BP: (126-174)/(73-98) 126/73  SpO2:  [94 %-99 %] 94 %    Physical Exam  Vitals signs and nursing note reviewed.   Constitutional:       Appearance: He is well-developed. He is not diaphoretic.   HENT:      Head: Normocephalic and atraumatic.      Mouth/Throat:      Pharynx: No oropharyngeal exudate.   Eyes:      General:         Right eye: No discharge.         Left eye: No discharge.      Conjunctiva/sclera: Conjunctivae normal.      Pupils: Pupils are equal, round, and reactive to light.   Neck:      Musculoskeletal: Neck supple.      Vascular: No JVD.      Trachea: No tracheal deviation.   Cardiovascular:      Rate and Rhythm: Normal rate and regular rhythm.      Heart sounds: No murmur. No friction rub. No gallop.    Pulmonary:      Effort: Pulmonary effort is normal. No respiratory distress.      Breath sounds: No stridor. Decreased breath sounds present. No wheezing.   Chest:      Chest wall: No tenderness.   Abdominal:      General: Bowel sounds are normal. There is no distension.      Palpations: Abdomen is soft.      Tenderness: There is no tenderness. There is no rebound.   Musculoskeletal:         General: No tenderness.   Skin:     General: Skin is warm and dry.       Nails: There is no clubbing.     Neurological:      General: No focal deficit present.      Mental Status: He is alert.      Cranial Nerves: No cranial nerve deficit.      Motor: No abnormal muscle tone.      Comments: Oriented to self   Psychiatric:      Comments: Impaired memory           Fluids    Intake/Output Summary (Last 24 hours) at 1/25/2020 1520  Last data filed at 1/25/2020 0930  Gross per 24 hour   Intake 500 ml   Output 2100 ml   Net -1600 ml       Laboratory  Recent Labs     01/23/20 0054 01/24/20 0224 01/25/20 0335   WBC 12.1* 5.1 4.8   RBC 3.90* 3.27* 3.53*   HEMOGLOBIN 12.0* 10.2* 11.1*   HEMATOCRIT 38.9* 31.4* 34.1*   MCV 99.7* 96.0 96.6   MCH 30.8 31.2 31.4   MCHC 30.8* 32.5* 32.6*   RDW 55.9* 52.2* 51.8*   PLATELETCT 172 142* 156*   MPV 10.9 10.7 10.5     Recent Labs     01/23/20 0054 01/24/20 0224 01/25/20 0335   SODIUM 143 140 138   POTASSIUM 4.5 3.5* 3.5*   CHLORIDE 111 111 106   CO2 22 21 20   GLUCOSE 109* 86 90   BUN 42* 35* 23*   CREATININE 1.80* 1.82* 1.66*   CALCIUM 10.0 9.2 9.6                   Imaging  DX-CHEST-PORTABLE (1 VIEW)   Final Result      1.  Bibasilar atelectasis.      2.  Mild cardiomegaly.           Assessment/Plan  Acute encephalopathy- (present on admission)  Assessment & Plan  Likely secondary to UTI and dehydration    Unsure of baseline mentation  Avoid sedating agents  Continue antibiotics for UTI    JAIRON (acute kidney injury) (HCC)  Assessment & Plan  In setting of chronic kidney disease stage III     Renal function improving  Continue to monitor and avoid nephrotoxic agents    Hypokalemia  Assessment & Plan  Replete and monitor    Dehydration- (present on admission)  Assessment & Plan  Monitor oral intake of IV fluids  Continue to hold home dose of Lasix    Urinary tract infection associated with indwelling urethral catheter (HCC)- (present on admission)  Assessment & Plan  Urine culture Pseudomonas    On IV Zosyn complete 5-day course of  antibiotics    HLD (hyperlipidemia)- (present on admission)  Assessment & Plan  Resume simvastatin    HTN (hypertension)- (present on admission)  Assessment & Plan  Continue to hold home dose of losartan diuretics given JAIRON    Hypothyroidism- (present on admission)  Assessment & Plan  Continue levothyroxine    TSH was elevated but free T4 was normal continue current dose recheck TFTs in a few weeks when recovered from acute illness      Plan of care reviewed with nursing staff pharmacist  Discussed with case management discharge planning patient will likely need placement unless he has more caregivers available at home    VTE prophylaxis: Lovenox

## 2020-01-26 ENCOUNTER — APPOINTMENT (OUTPATIENT)
Dept: RADIOLOGY | Facility: MEDICAL CENTER | Age: 85
DRG: 698 | End: 2020-01-26
Attending: HOSPITALIST
Payer: MEDICARE

## 2020-01-26 LAB
ANION GAP SERPL CALC-SCNC: 8 MMOL/L (ref 0–11.9)
BUN SERPL-MCNC: 23 MG/DL (ref 8–22)
CALCIUM SERPL-MCNC: 9 MG/DL (ref 8.5–10.5)
CHLORIDE SERPL-SCNC: 106 MMOL/L (ref 96–112)
CO2 SERPL-SCNC: 24 MMOL/L (ref 20–33)
CREAT SERPL-MCNC: 1.87 MG/DL (ref 0.5–1.4)
GLUCOSE SERPL-MCNC: 86 MG/DL (ref 65–99)
POTASSIUM SERPL-SCNC: 3.5 MMOL/L (ref 3.6–5.5)
SODIUM SERPL-SCNC: 138 MMOL/L (ref 135–145)

## 2020-01-26 PROCEDURE — 700102 HCHG RX REV CODE 250 W/ 637 OVERRIDE(OP): Performed by: HOSPITALIST

## 2020-01-26 PROCEDURE — 99232 SBSQ HOSP IP/OBS MODERATE 35: CPT | Performed by: HOSPITALIST

## 2020-01-26 PROCEDURE — 700102 HCHG RX REV CODE 250 W/ 637 OVERRIDE(OP): Performed by: INTERNAL MEDICINE

## 2020-01-26 PROCEDURE — 700105 HCHG RX REV CODE 258: Performed by: HOSPITALIST

## 2020-01-26 PROCEDURE — 700111 HCHG RX REV CODE 636 W/ 250 OVERRIDE (IP): Performed by: INTERNAL MEDICINE

## 2020-01-26 PROCEDURE — 770020 HCHG ROOM/CARE - TELE (206)

## 2020-01-26 PROCEDURE — 36415 COLL VENOUS BLD VENIPUNCTURE: CPT

## 2020-01-26 PROCEDURE — 71045 X-RAY EXAM CHEST 1 VIEW: CPT

## 2020-01-26 PROCEDURE — A9270 NON-COVERED ITEM OR SERVICE: HCPCS | Performed by: HOSPITALIST

## 2020-01-26 PROCEDURE — 700111 HCHG RX REV CODE 636 W/ 250 OVERRIDE (IP): Performed by: HOSPITALIST

## 2020-01-26 PROCEDURE — A9270 NON-COVERED ITEM OR SERVICE: HCPCS | Performed by: INTERNAL MEDICINE

## 2020-01-26 PROCEDURE — 80048 BASIC METABOLIC PNL TOTAL CA: CPT

## 2020-01-26 RX ORDER — POTASSIUM CHLORIDE 20 MEQ/1
40 TABLET, EXTENDED RELEASE ORAL ONCE
Status: COMPLETED | OUTPATIENT
Start: 2020-01-26 | End: 2020-01-26

## 2020-01-26 RX ADMIN — ALLOPURINOL 100 MG: 100 TABLET ORAL at 05:11

## 2020-01-26 RX ADMIN — SENNOSIDES AND DOCUSATE SODIUM 2 TABLET: 8.6; 5 TABLET ORAL at 05:11

## 2020-01-26 RX ADMIN — OMEPRAZOLE 40 MG: 20 CAPSULE, DELAYED RELEASE ORAL at 05:11

## 2020-01-26 RX ADMIN — ONDANSETRON 4 MG: 4 TABLET, ORALLY DISINTEGRATING ORAL at 20:18

## 2020-01-26 RX ADMIN — SIMVASTATIN 20 MG: 20 TABLET, FILM COATED ORAL at 20:18

## 2020-01-26 RX ADMIN — ENOXAPARIN SODIUM 30 MG: 100 INJECTION SUBCUTANEOUS at 05:11

## 2020-01-26 RX ADMIN — ACETAMINOPHEN 650 MG: 325 TABLET, FILM COATED ORAL at 20:18

## 2020-01-26 RX ADMIN — POTASSIUM CHLORIDE 40 MEQ: 1500 TABLET, EXTENDED RELEASE ORAL at 17:39

## 2020-01-26 RX ADMIN — PIPERACILLIN AND TAZOBACTAM 4.5 G: 4; .5 INJECTION, POWDER, LYOPHILIZED, FOR SOLUTION INTRAVENOUS; PARENTERAL at 20:19

## 2020-01-26 RX ADMIN — TAMSULOSIN HYDROCHLORIDE 0.4 MG: 0.4 CAPSULE ORAL at 09:27

## 2020-01-26 RX ADMIN — LEVOTHYROXINE SODIUM 88 MCG: 88 TABLET ORAL at 05:11

## 2020-01-26 RX ADMIN — PIPERACILLIN AND TAZOBACTAM 4.5 G: 4; .5 INJECTION, POWDER, LYOPHILIZED, FOR SOLUTION INTRAVENOUS; PARENTERAL at 05:11

## 2020-01-26 RX ADMIN — PIPERACILLIN AND TAZOBACTAM 4.5 G: 4; .5 INJECTION, POWDER, LYOPHILIZED, FOR SOLUTION INTRAVENOUS; PARENTERAL at 12:17

## 2020-01-26 NOTE — PROGRESS NOTES
"RN spoke with patient's niece at bedside. Irene states that prior to his stroke in October the patient was independent and \"sharp as a tack\". The patient was at [facility] for Rehab, however, Irene states that patient still has difficulty with word finding and being able to feed himself as well as other ADLs.   Irene has contacted a friend with home health experience who is going to move in to the patients home on discharge to provide 24 hour care. Irene will still provide rides to appointments and grocery shopping.   "

## 2020-01-26 NOTE — DISCHARGE PLANNING
"Anticipated Discharge Disposition:   TBD    Action:   · Outreach call to pt's niece Irene to follow up regarding discharge plans/barriers:  · Irene reported she has been pt's and spouse's primary care giver since her father (pt's brother passed away in July). She indicated she does not live with pt or spouse but checks on them daily. Per Irene, wife Hannah has significant dementia and \"does not know the who, what, where, when.\" She reported she would like pt to be able to return home and they have a friend who is planning to move in with pt and spouse to be present 24/7 for support and caregiving. She also reported she would like C services resumed through [facility]. LSW discussed that HHC was typically a temporary services and potential plans once HHC discharge. She reported pt and spouse do have \"a comfortable intermediate\" and discussed looking into and hiring private caregiver/agency for additional support. She reported caregiver resource list be emailed to her to review at [email or web address]--Resource list sent.    · Prior to this admission pt was at [facility] and returned home for a day and half prior to this admission. She reported prior these medical events pt was independent with his ADLs/iADLs, she provided transportation but he was able to care for self and his wife.   Barriers to Discharge:   · TBD    Plan:   · Care coordination will continue to follow up and provide assistance with discharge plans/barriers.     "

## 2020-01-26 NOTE — PROGRESS NOTES
Hospital Medicine Daily Progress Note    Date of Service  1/26/2020    Chief Complaint  84 y.o. male admitted 1/22/2020 with acute encephalopathy secondary to UTI and dehydration    Hospital Course          Interval Problem Update    T-max 100.5 yesterday afternoon  Blood cultures obtained  This x-ray reviewed with no acute pathology  Oriented to self  K 3.5      Consultants/Specialty  none    Code Status  Full code    Disposition  Home with full-time caregivers    Review of Systems  Review of Systems   Unable to perform ROS: Mental acuity        Physical Exam  Temp:  [35.9 °C (96.7 °F)-38.1 °C (100.5 °F)] 36.3 °C (97.4 °F)  Pulse:  [61-90] 70  Resp:  [16-17] 16  BP: ()/() 107/69  SpO2:  [97 %-99 %] 97 %    Physical Exam  Vitals signs and nursing note reviewed.   Constitutional:       Appearance: He is well-developed. He is not diaphoretic.   HENT:      Head: Normocephalic and atraumatic.      Mouth/Throat:      Pharynx: No oropharyngeal exudate.   Eyes:      General: No scleral icterus.        Right eye: No discharge.         Left eye: No discharge.      Conjunctiva/sclera: Conjunctivae normal.      Pupils: Pupils are equal, round, and reactive to light.   Neck:      Musculoskeletal: Neck supple.      Vascular: No JVD.      Trachea: No tracheal deviation.   Cardiovascular:      Rate and Rhythm: Normal rate and regular rhythm.      Heart sounds: No murmur. No friction rub. No gallop.    Pulmonary:      Effort: Pulmonary effort is normal. No respiratory distress.      Breath sounds: No stridor. Rhonchi (Bilateral coarse) present. No wheezing.   Chest:      Chest wall: No tenderness.   Abdominal:      General: Bowel sounds are normal. There is no distension.      Palpations: Abdomen is soft.      Tenderness: There is no tenderness. There is no rebound.   Musculoskeletal:         General: No tenderness.   Skin:     General: Skin is warm and dry.      Nails: There is no clubbing.     Neurological:       General: No focal deficit present.      Mental Status: He is alert.      Cranial Nerves: No cranial nerve deficit.      Motor: No abnormal muscle tone.      Comments: Oriented to self   Psychiatric:         Behavior: Behavior is slowed.         Cognition and Memory: He exhibits impaired recent memory.         Fluids    Intake/Output Summary (Last 24 hours) at 1/26/2020 1535  Last data filed at 1/26/2020 1000  Gross per 24 hour   Intake 240 ml   Output 1100 ml   Net -860 ml       Laboratory  Recent Labs     01/24/20 0224 01/25/20 0335   WBC 5.1 4.8   RBC 3.27* 3.53*   HEMOGLOBIN 10.2* 11.1*   HEMATOCRIT 31.4* 34.1*   MCV 96.0 96.6   MCH 31.2 31.4   MCHC 32.5* 32.6*   RDW 52.2* 51.8*   PLATELETCT 142* 156*   MPV 10.7 10.5     Recent Labs     01/24/20 0224 01/25/20 0335 01/26/20 0222   SODIUM 140 138 138   POTASSIUM 3.5* 3.5* 3.5*   CHLORIDE 111 106 106   CO2 21 20 24   GLUCOSE 86 90 86   BUN 35* 23* 23*   CREATININE 1.82* 1.66* 1.87*   CALCIUM 9.2 9.6 9.0                   Imaging  DX-CHEST-PORTABLE (1 VIEW)   Final Result      No acute cardiopulmonary disease.      DX-CHEST-PORTABLE (1 VIEW)   Final Result      1.  Bibasilar atelectasis.      2.  Mild cardiomegaly.           Assessment/Plan  Acute encephalopathy- (present on admission)  Assessment & Plan  Likely secondary to UTI and dehydration    Avoid sedating agents  Frequent orientation    JAIRON (acute kidney injury) (HCC)  Assessment & Plan  In setting of chronic kidney disease stage III     Monitor renal function    Hypokalemia  Assessment & Plan  Replete orally and monitor levels      Dehydration- (present on admission)  Assessment & Plan  Encourage p.o. intake  Continue to hold diuretics    Urinary tract infection associated with indwelling urethral catheter (HCC)- (present on admission)  Assessment & Plan  Urine culture Pseudomonas    Continue IV Zosyn will complete 5-day course tomorrow    HLD (hyperlipidemia)- (present on admission)  Assessment &  Plan  Continue simvastatin    HTN (hypertension)- (present on admission)  Assessment & Plan  Blood pressure stable off medical therapy continue to hold losartan and Lasix    Hypothyroidism- (present on admission)  Assessment & Plan  Continue levothyroxine    TSH was elevated but free T4 was normal continue current dose recheck TFTs in a few weeks when recovered from acute illness      Discussed with nursing staff pharmacist and case management  Patient's niece is in the process of hiring full-time caregivers to care for patient and his wife after discharge      VTE prophylaxis: Lovenox

## 2020-01-27 LAB
ANION GAP SERPL CALC-SCNC: 6 MMOL/L (ref 0–11.9)
BACTERIA BLD CULT: NORMAL
BACTERIA BLD CULT: NORMAL
BASOPHILS # BLD AUTO: 0.6 % (ref 0–1.8)
BASOPHILS # BLD: 0.03 K/UL (ref 0–0.12)
BUN SERPL-MCNC: 22 MG/DL (ref 8–22)
CALCIUM SERPL-MCNC: 8.9 MG/DL (ref 8.5–10.5)
CHLORIDE SERPL-SCNC: 109 MMOL/L (ref 96–112)
CO2 SERPL-SCNC: 23 MMOL/L (ref 20–33)
CREAT SERPL-MCNC: 1.6 MG/DL (ref 0.5–1.4)
EOSINOPHIL # BLD AUTO: 0.47 K/UL (ref 0–0.51)
EOSINOPHIL NFR BLD: 8.7 % (ref 0–6.9)
ERYTHROCYTE [DISTWIDTH] IN BLOOD BY AUTOMATED COUNT: 50.6 FL (ref 35.9–50)
GLUCOSE SERPL-MCNC: 79 MG/DL (ref 65–99)
HCT VFR BLD AUTO: 31.7 % (ref 42–52)
HGB BLD-MCNC: 10.2 G/DL (ref 14–18)
IMM GRANULOCYTES # BLD AUTO: 0.04 K/UL (ref 0–0.11)
IMM GRANULOCYTES NFR BLD AUTO: 0.7 % (ref 0–0.9)
LYMPHOCYTES # BLD AUTO: 1.26 K/UL (ref 1–4.8)
LYMPHOCYTES NFR BLD: 23.3 % (ref 22–41)
MCH RBC QN AUTO: 30.9 PG (ref 27–33)
MCHC RBC AUTO-ENTMCNC: 32.2 G/DL (ref 33.7–35.3)
MCV RBC AUTO: 96.1 FL (ref 81.4–97.8)
MONOCYTES # BLD AUTO: 0.53 K/UL (ref 0–0.85)
MONOCYTES NFR BLD AUTO: 9.8 % (ref 0–13.4)
NEUTROPHILS # BLD AUTO: 3.08 K/UL (ref 1.82–7.42)
NEUTROPHILS NFR BLD: 56.9 % (ref 44–72)
NRBC # BLD AUTO: 0 K/UL
NRBC BLD-RTO: 0 /100 WBC
PLATELET # BLD AUTO: 141 K/UL (ref 164–446)
PMV BLD AUTO: 10.5 FL (ref 9–12.9)
POTASSIUM SERPL-SCNC: 3.8 MMOL/L (ref 3.6–5.5)
RBC # BLD AUTO: 3.3 M/UL (ref 4.7–6.1)
SIGNIFICANT IND 70042: NORMAL
SIGNIFICANT IND 70042: NORMAL
SITE SITE: NORMAL
SITE SITE: NORMAL
SODIUM SERPL-SCNC: 138 MMOL/L (ref 135–145)
SOURCE SOURCE: NORMAL
SOURCE SOURCE: NORMAL
WBC # BLD AUTO: 5.4 K/UL (ref 4.8–10.8)

## 2020-01-27 PROCEDURE — A9270 NON-COVERED ITEM OR SERVICE: HCPCS | Performed by: INTERNAL MEDICINE

## 2020-01-27 PROCEDURE — 36415 COLL VENOUS BLD VENIPUNCTURE: CPT

## 2020-01-27 PROCEDURE — A9270 NON-COVERED ITEM OR SERVICE: HCPCS | Performed by: HOSPITALIST

## 2020-01-27 PROCEDURE — 770006 HCHG ROOM/CARE - MED/SURG/GYN SEMI*

## 2020-01-27 PROCEDURE — 700102 HCHG RX REV CODE 250 W/ 637 OVERRIDE(OP): Performed by: INTERNAL MEDICINE

## 2020-01-27 PROCEDURE — 700111 HCHG RX REV CODE 636 W/ 250 OVERRIDE (IP): Performed by: HOSPITALIST

## 2020-01-27 PROCEDURE — 85025 COMPLETE CBC W/AUTO DIFF WBC: CPT

## 2020-01-27 PROCEDURE — 99232 SBSQ HOSP IP/OBS MODERATE 35: CPT | Performed by: HOSPITALIST

## 2020-01-27 PROCEDURE — 700105 HCHG RX REV CODE 258: Performed by: HOSPITALIST

## 2020-01-27 PROCEDURE — 80048 BASIC METABOLIC PNL TOTAL CA: CPT

## 2020-01-27 PROCEDURE — 700102 HCHG RX REV CODE 250 W/ 637 OVERRIDE(OP): Performed by: HOSPITALIST

## 2020-01-27 RX ADMIN — ENOXAPARIN SODIUM 30 MG: 100 INJECTION SUBCUTANEOUS at 05:20

## 2020-01-27 RX ADMIN — SIMVASTATIN 20 MG: 20 TABLET, FILM COATED ORAL at 19:47

## 2020-01-27 RX ADMIN — TAMSULOSIN HYDROCHLORIDE 0.4 MG: 0.4 CAPSULE ORAL at 09:01

## 2020-01-27 RX ADMIN — PIPERACILLIN AND TAZOBACTAM 4.5 G: 4; .5 INJECTION, POWDER, LYOPHILIZED, FOR SOLUTION INTRAVENOUS; PARENTERAL at 15:26

## 2020-01-27 RX ADMIN — LEVOTHYROXINE SODIUM 88 MCG: 88 TABLET ORAL at 05:21

## 2020-01-27 RX ADMIN — OMEPRAZOLE 40 MG: 20 CAPSULE, DELAYED RELEASE ORAL at 05:20

## 2020-01-27 RX ADMIN — PIPERACILLIN AND TAZOBACTAM 4.5 G: 4; .5 INJECTION, POWDER, LYOPHILIZED, FOR SOLUTION INTRAVENOUS; PARENTERAL at 05:20

## 2020-01-27 RX ADMIN — ACETAMINOPHEN 650 MG: 325 TABLET, FILM COATED ORAL at 05:20

## 2020-01-27 RX ADMIN — ALLOPURINOL 100 MG: 100 TABLET ORAL at 05:20

## 2020-01-27 NOTE — CONSULTS
"Reason for PC Consult: Advance Care Planning    Consulted by: Dr. aNel Huang    Assessment:  General: 89 y/o male from home with increased confusion.  Patient with known history of dementia who was at [facility] for therapies before returning home.  Patient was home for 1 day then brought to the emergency room and found to have UTI and dehydration. PMHx of hypertension, hypothyroid, BPH, dyslipidemia.     Social: Patient lives at home with his wife Hannah who reportedly also carries a history of dementia.  Their niece Irene is involved in their care, assist with grocery shopping and appointments but the couple does live alone.  Plan is to hire caregivers at this point    Consults: n/a    Dyspnea: No-    Last BM: 01/26/20-    Pain: No-    Depression: Unable to determine-    Dementia: Yes;  5    Spiritual:  Is Scientologist or spirituality important for coping with this illness? No-    Has a  or spiritual provider visit been requested? No    Palliative Performance Scale: 40%    Advance Directive: None- no prior visits at [facility] or knowledge of whether one exists   DPOA: No-  NOK is wife who also has dementia per nitootie Bonilla   POLST: No-      Code Status: Full- will discuss when family calls back      Outcome:  PC RN visited bedside.  Efra was up in the chair eating lunch, A&O x1. Call placed to audie Bonilla and message left requesting call back. Call placed to wife Hannah who recalls visiting yesterday and plans to come back tomorrow. She endorses Irene is good support and helps with grocery shopping, appointments, etc. PC asked if she had any questions about Ced's current situation and she states \"well they gave him an IV and I think he's doing better.\" She mentioned the \"IV\" numerous times as his source of improvement but unable to provide any other specifics. She was happy that \"he's still with us and that's a blessing.\" She endorsed having trouble \"keeping it all straight\" but again excited that \"the IV " "is helping him.\"      Hannah believes they's done \"a will\" but not sure if it's been taken to any hospital. She was unable to recall definitively what hospital Ced was at previously and struggled with the name, though it was [facility]. She suggested this RN \"call around to find out.\" PC RN assessed his baseline mentation and she believes it \"because of everything he's been through.\" PC asked how she feels day to day and she states \"well, I haven't been sleeping much and with everything going on, it's hard to know.\" She states, \"I push myself too much\" and this is affecting her ability to recall/remember. She states that she and Irene are planning to come \"tomorrow around 3\" and PC made a plan to stop in then. Hannah denied any questions at this time and states some friends are coming to visit today and hopeful for a \"good update.\" She reveled in all the \"great support\" they have. While talking to Ced's wife, PC RN provided therapeutic communication, including open ended questions, reflective listening, and normalization of thought and feelings throughout encounter, as well as reinforced his goals of care.     Updated: BS team; MD    Plan: gather more information when Irene calls back/Tuesday visit    Thank you for allowing Palliative Care to support this patient and family. Contact [phone] for additional assistance, change in patient status, or with any questions/concerns.   "

## 2020-01-27 NOTE — CARE PLAN
Problem: Communication  Goal: The ability to communicate needs accurately and effectively will improve  Outcome: PROGRESSING AS EXPECTED  Note:   Patient with short lag before answering, has word salad, unable to say what he means.      Problem: Safety  Goal: Will remain free from injury  Outcome: PROGRESSING AS EXPECTED     Problem: Infection  Goal: Will remain free from infection  Outcome: PROGRESSING AS EXPECTED  Note:   Zosyn as ordered.     Problem: Bowel/Gastric:  Goal: Will not experience complications related to bowel motility  Outcome: PROGRESSING AS EXPECTED  Note:   Inc of stools,      Problem: Knowledge Deficit  Goal: Knowledge of the prescribed therapeutic regimen will improve  Outcome: PROGRESSING AS EXPECTED  Note:   Review of care plan, medications and abx therapy. Hourly rounding implemented     Problem: Urinary Elimination:  Goal: Ability to reestablish a normal urinary elimination pattern will improve  Outcome: PROGRESSING AS EXPECTED  Lockhart patent, lockhart care provided.

## 2020-01-27 NOTE — PROGRESS NOTES
Monitor Summary: NSR - SB HR 74-54 1st avb   Rare PVCs and PACs lowest rate at 39 when asleep.   .22/.08/.44

## 2020-01-27 NOTE — FACE TO FACE
Face to Face Supporting Documentation - Home Health    The encounter with this patient was in whole or in part the primary reason for home health admission.    Date of encounter:   Patient:                    MRN:                       YOB: 2020  Efra Soto  [id number]  1935     Home health to see patient for:  Skilled Nursing care for assessment, interventions & education, Physical Therapy evaluation and treatment and Occupational therapy evaluation and treatment    Skilled need for:  New Onset Medical Diagnosis UTI sepsis    Skilled nursing interventions to include:  Comment: Medical management and monitor    Homebound status evidenced by:  Needs the assistance of another person in order to leave the home. Leaving home requires a considerable and taxing effort. There is a normal inability to leave the home.    Community Physician to provide follow up care: Vini Menjivar M.D.     Optional Interventions? No      I certify the face to face encounter for this home health care referral meets the CMS requirements and the encounter/clinical assessment with the patient was, in whole, or in part, for the medical condition(s) listed above, which is the primary reason for home health care. Based on my clinical findings: the service(s) are medically necessary, support the need for home health care, and the homebound criteria are met.  I certify that this patient has had a face to face encounter by myself.  Leon Huang M.D. - NPI: [id number]

## 2020-01-27 NOTE — PALLIATIVE CARE
"Palliative Care follow-up  Return call from audie Bonilla. She states she is the only family left and has been very involved in Ced's life since her dad passed in July. She joked about not having \"anyone to care for me when it's my turn though.\" She confirmed the plan for more support in the home and will look for the list sent to her by . PC RN inquired about the AD/POA and she recalls it being done but states she has not been able to locate it. PC updated her that there is no copy on file here this RN was able to find. She states \"He's already made the decision for DNR and no machines though.\" PC RN acknowledged her statement and discussed POLST. She agrees that completing this would be helpful for at home. Plan made to visit with she and the couple tomorrow btw [phone] when they're present. Irene denied any questions/conconers at this time.       Updated: MD    Plan: update code status based on pt/family wishes. Visit Tuesday for POLST    Thank you for allowing Palliative Care to support this patient and family. Contact [phone] for additional assistance, change in patient status, or with any questions/concerns.   "

## 2020-01-27 NOTE — CARE PLAN
Problem: Bowel/Gastric:  Goal: Normal bowel function is maintained or improved  Outcome: PROGRESSING AS EXPECTED  Goal: Will not experience complications related to bowel motility  Outcome: PROGRESSING AS EXPECTED     Problem: Knowledge Deficit  Goal: Knowledge of disease process/condition, treatment plan, diagnostic tests, and medications will improve  Outcome: PROGRESSING AS EXPECTED

## 2020-01-27 NOTE — PROGRESS NOTES
Assumed care at 1900, bedside report received from Jenni HERNANDEZ. Pt. Is NSR on the monitor. Initial assessment completed, orders reviewed, call light within reach, bed alarm  in use, and hourly rounding in place. POC addressed with patient, no additional questions at this time.

## 2020-01-28 PROCEDURE — 770006 HCHG ROOM/CARE - MED/SURG/GYN SEMI*

## 2020-01-28 PROCEDURE — 99232 SBSQ HOSP IP/OBS MODERATE 35: CPT | Performed by: STUDENT IN AN ORGANIZED HEALTH CARE EDUCATION/TRAINING PROGRAM

## 2020-01-28 PROCEDURE — 700111 HCHG RX REV CODE 636 W/ 250 OVERRIDE (IP): Performed by: HOSPITALIST

## 2020-01-28 PROCEDURE — 700102 HCHG RX REV CODE 250 W/ 637 OVERRIDE(OP): Performed by: HOSPITALIST

## 2020-01-28 PROCEDURE — 700102 HCHG RX REV CODE 250 W/ 637 OVERRIDE(OP): Performed by: INTERNAL MEDICINE

## 2020-01-28 PROCEDURE — A9270 NON-COVERED ITEM OR SERVICE: HCPCS | Performed by: INTERNAL MEDICINE

## 2020-01-28 PROCEDURE — A9270 NON-COVERED ITEM OR SERVICE: HCPCS | Performed by: HOSPITALIST

## 2020-01-28 RX ADMIN — SIMVASTATIN 20 MG: 20 TABLET, FILM COATED ORAL at 19:40

## 2020-01-28 RX ADMIN — OMEPRAZOLE 40 MG: 20 CAPSULE, DELAYED RELEASE ORAL at 06:41

## 2020-01-28 RX ADMIN — TAMSULOSIN HYDROCHLORIDE 0.4 MG: 0.4 CAPSULE ORAL at 06:42

## 2020-01-28 RX ADMIN — ENOXAPARIN SODIUM 30 MG: 100 INJECTION SUBCUTANEOUS at 06:42

## 2020-01-28 RX ADMIN — ALLOPURINOL 100 MG: 100 TABLET ORAL at 06:42

## 2020-01-28 RX ADMIN — LEVOTHYROXINE SODIUM 88 MCG: 88 TABLET ORAL at 06:42

## 2020-01-28 NOTE — DISCHARGE PLANNING
Anticipated Discharge Disposition: TBD     Action: LSW left vm for Irene bronson to verify the discharge plan.     Barriers to Discharge: none     Plan: LSW will continue to assess for any discharge needs.

## 2020-01-28 NOTE — PROGRESS NOTES
Hospital Medicine Daily Progress Note    Date of Service  1/27/2020    Chief Complaint  84 y.o. male admitted 1/22/2020 with acute encephalopathy secondary to UTI and dehydration    Hospital Course          Interval Problem Update    Afebrile  More alert today  Oriented x2  Poor intake  Complains of generalized malaise and fatigue  Review of system otherwise limited by poor short-term memory      Consultants/Specialty  none    Code Status  Full code    Disposition  Home with full-time caregivers    Review of Systems  Review of Systems   Unable to perform ROS: Mental acuity        Physical Exam  Temp:  [36.1 °C (97 °F)-36.3 °C (97.4 °F)] 36.1 °C (97 °F)  Pulse:  [56-68] 56  Resp:  [15-16] 16  BP: (110-132)/(73-84) 125/73  SpO2:  [94 %-98 %] 97 %    Physical Exam  Vitals signs and nursing note reviewed.   Constitutional:       Appearance: He is well-developed. He is not diaphoretic.   HENT:      Head: Normocephalic and atraumatic.      Mouth/Throat:      Pharynx: No oropharyngeal exudate.   Eyes:      General: No scleral icterus.        Right eye: No discharge.         Left eye: No discharge.      Conjunctiva/sclera: Conjunctivae normal.      Pupils: Pupils are equal, round, and reactive to light.   Neck:      Musculoskeletal: Neck supple.      Vascular: No JVD.      Trachea: No tracheal deviation.   Cardiovascular:      Rate and Rhythm: Normal rate and regular rhythm.      Heart sounds: No murmur. No friction rub. No gallop.    Pulmonary:      Effort: Pulmonary effort is normal. No respiratory distress.      Breath sounds: Normal breath sounds. No stridor. No wheezing.   Chest:      Chest wall: No tenderness.   Abdominal:      General: Bowel sounds are normal. There is no distension.      Palpations: Abdomen is soft.      Tenderness: There is no tenderness. There is no rebound.   Musculoskeletal:         General: No tenderness.   Skin:     General: Skin is warm and dry.      Nails: There is no clubbing.      Neurological:      Mental Status: He is alert.      Cranial Nerves: No cranial nerve deficit.      Motor: No abnormal muscle tone.      Comments: Oriented x2   Psychiatric:         Behavior: Behavior is slowed.         Cognition and Memory: He exhibits impaired recent memory.         Fluids    Intake/Output Summary (Last 24 hours) at 1/27/2020 1641  Last data filed at 1/27/2020 0400  Gross per 24 hour   Intake 968.33 ml   Output 1000 ml   Net -31.67 ml       Laboratory  Recent Labs     01/25/20  0335 01/27/20  0327   WBC 4.8 5.4   RBC 3.53* 3.30*   HEMOGLOBIN 11.1* 10.2*   HEMATOCRIT 34.1* 31.7*   MCV 96.6 96.1   MCH 31.4 30.9   MCHC 32.6* 32.2*   RDW 51.8* 50.6*   PLATELETCT 156* 141*   MPV 10.5 10.5     Recent Labs     01/25/20  0335 01/26/20  0222 01/27/20  0327   SODIUM 138 138 138   POTASSIUM 3.5* 3.5* 3.8   CHLORIDE 106 106 109   CO2 20 24 23   GLUCOSE 90 86 79   BUN 23* 23* 22   CREATININE 1.66* 1.87* 1.60*   CALCIUM 9.6 9.0 8.9                   Imaging  DX-CHEST-PORTABLE (1 VIEW)   Final Result      No acute cardiopulmonary disease.      DX-CHEST-PORTABLE (1 VIEW)   Final Result      1.  Bibasilar atelectasis.      2.  Mild cardiomegaly.           Assessment/Plan  Acute encephalopathy- (present on admission)  Assessment & Plan  Likely secondary to UTI and dehydration    Overall improved  Avoid sedating agents frequent orientation    JAIRON (acute kidney injury) (HCC)  Assessment & Plan  In setting of chronic kidney disease stage III     Renal function improved    Hypokalemia  Assessment & Plan        Dehydration- (present on admission)  Assessment & Plan  Encourage and monitor p.o. intake  Hold diuretics on hold he was severely dehydrated on presentation  Monitor fluid status    Urinary tract infection associated with indwelling urethral catheter (HCC)- (present on admission)  Assessment & Plan  Urine culture Pseudomonas    Complete 5-day course of Zosyn    HLD (hyperlipidemia)- (present on  admission)  Assessment & Plan  Continue simvastatin    HTN (hypertension)- (present on admission)  Assessment & Plan  Stable monitor off losartan and Lasix    Hypothyroidism- (present on admission)  Assessment & Plan  Continue levothyroxine    TSH was elevated but free T4 was normal continue current dose recheck TFTs in a few weeks when recovered from acute illness and after documenting compliance with medical therapy      Of care discussed with patient also discussed with nursing staff case management and palliative care.  Niece is hiring 24/7 caregivers to help take care of patient and wife at home discussed with  confirming that caregiver would be available starting tomorrow in anticipation of discharge will also do home health referral.  Discussed with palliative care wife has dementia and niece is apparently  Only next of kin she will be here tomorrow to discuss advanced directive complete POLST form    VTE prophylaxis: Lovenox

## 2020-01-28 NOTE — PROGRESS NOTES
[facility]    2 RN skin check complete with MARY Gibson.   Devices in place CHARI, Rosa.  Skin assessed under devices yes.  Confirmed pressure ulcers found on NA.  New potential pressure ulcers noted on sacral area.   Wound consult placed - yes.  The following interventions in place Q2 repositioning, 2 RN skin check, waffle cushion, pillow support, barrier cream, foam.    Skin Assessment -   Elbows - pink/blanching  Sacral Area - small open area/red/slow to agapito/peeling  Right foot 2nd toe - red/slow to agapito/foam in place   Bilateral heels - pink/boggy/blanching

## 2020-01-28 NOTE — PROGRESS NOTES
Patient A+ox2, confused to time and situation. On room air. HR in 60s while awake. No nausea or pain. Up to chair for breakfast, good appetite. Rosa in place draining clear yellow urine. Call bell in hand, bed alarm on, whiteboard updated, fall precautions in place

## 2020-01-28 NOTE — CARE PLAN
Problem: Infection  Goal: Will remain free from infection  Outcome: PROGRESSING AS EXPECTED  Note:   Monitor patient's appearance and vitals for potential for infection.      Problem: Knowledge Deficit  Goal: Knowledge of disease process/condition, treatment plan, diagnostic tests, and medications will improve  Outcome: PROGRESSING SLOWER THAN EXPECTED  Note:   Patient has history of dementia and needs frequent reorientation to maintain safety.

## 2020-01-28 NOTE — PROGRESS NOTES
Patient left the floor at this time with transport to [place] [id number]. Transported via bed with all personal belongings

## 2020-01-28 NOTE — DISCHARGE PLANNING
Anticipated Discharge Disposition: TBD     Action: LSW tried audie Bonilla, again. LSW left vm.     Barriers to Discharge: none     Plan: LSW will continue to assess for any discharge needs.

## 2020-01-28 NOTE — CARE PLAN
Problem: Nutritional:  Goal: Achieve adequate nutritional intake  Description  Patient will consume 50% of meals/supplements + snacks   Outcome: MET   Per ADL documentation pt is consuming > 50% of most meals, and consuming Boost supplements as well.     RD available prn - and will monitor per dept policy

## 2020-01-28 NOTE — PROGRESS NOTES
No acute events overnight. Pt transferred to [facility] from [facility]. Pt remains A&Ox1, on room air. Q2 repositioning. Pt denies pain. Rosa in place. Safety precautions maintained. Will continue to monitor and report to the oncoming nurse.

## 2020-01-28 NOTE — CARE PLAN
Problem: Safety  Goal: Will remain free from injury  Outcome: PROGRESSING AS EXPECTED     Problem: Knowledge Deficit  Goal: Knowledge of disease process/condition, treatment plan, diagnostic tests, and medications will improve  Outcome: PROGRESSING AS EXPECTED     Problem: Skin Integrity  Goal: Risk for impaired skin integrity will decrease  Outcome: PROGRESSING AS EXPECTED     Problem: Urinary Elimination:  Goal: Ability to reestablish a normal urinary elimination pattern will improve  Outcome: PROGRESSING AS EXPECTED

## 2020-01-29 PROBLEM — G93.41 ACUTE METABOLIC ENCEPHALOPATHY: Status: ACTIVE | Noted: 2020-01-22

## 2020-01-29 PROCEDURE — A9270 NON-COVERED ITEM OR SERVICE: HCPCS | Performed by: HOSPITALIST

## 2020-01-29 PROCEDURE — 770006 HCHG ROOM/CARE - MED/SURG/GYN SEMI*

## 2020-01-29 PROCEDURE — 700111 HCHG RX REV CODE 636 W/ 250 OVERRIDE (IP): Performed by: HOSPITALIST

## 2020-01-29 PROCEDURE — A9270 NON-COVERED ITEM OR SERVICE: HCPCS | Performed by: INTERNAL MEDICINE

## 2020-01-29 PROCEDURE — 51798 US URINE CAPACITY MEASURE: CPT

## 2020-01-29 PROCEDURE — 99231 SBSQ HOSP IP/OBS SF/LOW 25: CPT | Performed by: STUDENT IN AN ORGANIZED HEALTH CARE EDUCATION/TRAINING PROGRAM

## 2020-01-29 PROCEDURE — 700102 HCHG RX REV CODE 250 W/ 637 OVERRIDE(OP): Performed by: HOSPITALIST

## 2020-01-29 PROCEDURE — 700102 HCHG RX REV CODE 250 W/ 637 OVERRIDE(OP): Performed by: INTERNAL MEDICINE

## 2020-01-29 RX ADMIN — ENOXAPARIN SODIUM 30 MG: 100 INJECTION SUBCUTANEOUS at 04:36

## 2020-01-29 RX ADMIN — SIMVASTATIN 20 MG: 20 TABLET, FILM COATED ORAL at 21:08

## 2020-01-29 RX ADMIN — OMEPRAZOLE 40 MG: 20 CAPSULE, DELAYED RELEASE ORAL at 04:35

## 2020-01-29 RX ADMIN — TAMSULOSIN HYDROCHLORIDE 0.4 MG: 0.4 CAPSULE ORAL at 09:11

## 2020-01-29 RX ADMIN — SENNOSIDES AND DOCUSATE SODIUM 2 TABLET: 8.6; 5 TABLET ORAL at 18:07

## 2020-01-29 RX ADMIN — LEVOTHYROXINE SODIUM 88 MCG: 88 TABLET ORAL at 04:35

## 2020-01-29 RX ADMIN — ALLOPURINOL 100 MG: 100 TABLET ORAL at 04:35

## 2020-01-29 ASSESSMENT — ENCOUNTER SYMPTOMS
FEVER: 0
NERVOUS/ANXIOUS: 0
DEPRESSION: 0

## 2020-01-29 NOTE — PROGRESS NOTES
2 RN skin check complete with MARY Gibson.   Devices in place PIV.  Skin assessed under devices yes.  Confirmed pressure ulcers found on Coccyx, Sacrum.  New potential pressure ulcers noted on N/A  Wound consult placed - seen on 1/28.  The following interventions in place Q2 repositioning, 2 RN skin check, waffle cushion, pillow support, barrier cream, foam.     Skin Assessment -   Elbows - pink/blanching  Sacral Area - small open area/red/light purple/slow to agapito/peeling  Right foot 2nd toe - red/slow to agapito/foam in place   Bilateral heels - pink/boggy/blanching

## 2020-01-29 NOTE — DISCHARGE PLANNING
Anticipated Discharge Disposition: SNF     Action: LSW got a call back from the pt's niece Irene. Irene confirmed that her friend would be providing 24/7 care and is moving into the home this weekend. Irene verified that they would like to go home with HH for the pt.     Barriers to Discharge: none     Plan: LSW will continue to assess for any discharge needs.

## 2020-01-29 NOTE — FACE TO FACE
Face to Face Supporting Documentation - Home Health    The encounter with this patient was in whole or in part the primary reason for home health admission.    Date of encounter:   Patient:                    MRN:                       YOB: 2020  Efra Soto  [id number]  1935     Home health to see patient for:  Skilled Nursing care for assessment, interventions & education, Home health aide, Physical Therapy evaluation and treatment, Occupational therapy evaluation and treatment and Speech Language Pathology evaluation and treatment    Skilled need for:  New Onset Medical Diagnosis metabolic encephalopathy, ataxia, cognitive impairment    Skilled nursing interventions to include:  Comment: as revealed in the home assessment    Homebound status evidenced by:  Needs the assistance of another person in order to leave the home. Leaving home requires a considerable and taxing effort. There is a normal inability to leave the home.    Community Physician to provide follow up care: Vini Menjivar M.D.     Optional Interventions? No      I certify the face to face encounter for this home health care referral meets the CMS requirements and the encounter/clinical assessment with the patient was, in whole, or in part, for the medical condition(s) listed above, which is the primary reason for home health care. Based on my clinical findings: the service(s) are medically necessary, support the need for home health care, and the homebound criteria are met.  I certify that this patient has had a face to face encounter by myself.  Lawrence Gonzalez D.O. - CHARLESI: [phone]

## 2020-01-29 NOTE — PROGRESS NOTES
"Hospitalist Daily Progress Note    Chief Complaint   Patient presents with   • Fall    Patient was seen and evaluated today for Fall   Hospital Course     ER Course  Efra Soto is a 84 y.o. male w/ pmhx of dementia, BPH, HLD, hypothyroidism and recent personality/behavioral changes who presents to the ED after being found on the floor of his home with the gas stove on. Niece provided history- states that earlier today an oxygen supply company arrived at the home to drop of supplemental oxygen. When they arrived patient was noted to be on the floor presumably having fallen out of his wheelchair.  It was also noted that the gas stove had been left on for an unknown amount of time. Fire department was called and patient was brought to the ED. Patient does not recall the event. Wife is present at time of interview as well but also has dementia and does not recall the events of the day. Patient was recently discharged from [facility] 3 days ago after being hospitalized back in October 2019 for encephalopathy of unknown cause. Niece reports that patient began having difficulty with speech, mentation and behavioral issues since 09/2019. After hospitalization in 10/209 mirlande states he has only become worse but admitted some days are better than others.      In the ED he has no complaints of pain (chest pain, abdominal pain, HA, BLE pain). He states he is having a hard time remembering what is going on- states \"it has been a lot.\".     Per chart review: admission 10/2019 had \"MRI and showed possible restricted diffusion in the bilateral parietal occipital region which carries a extensive differential including Creutzfeldt Thang, metabolic encephalopathy, or potential seizure activity, the patient was evaluated by a neurologist who suggested these imaging findings are secondary to an underlying metabolic encephalopathy due to acute kidney failure and uremia and the neurologist recommended follow-up with " "repeat MRI if there is no improvement. Also the patient had acute kidney injury with creatinine around 6 due to obstructive uropathy, was evaluated by urologist who recommended starting Flomax and CBI which showed some clot, and no cystoscopy was done and the urologist recommended to follow-up with the clinic in 2 weeks for voiding trial and removing the Rosa, however his urine got cleard and no more clots or blood, and his creatinine has improved significantly from 6.4 to1.5 with improving and his mental status.\"     Since this admission Nitootie reports she is unsure if he has had any follow up with the urologist. She does not know when the Rosa was last changed. Patient did have a repeat MRI 12/3/20 that showed not change from previous.    Admission Day Course - 1/22  84 y.o. male with past medical history of essential hypertension, hypothyroid, BPH, dyslipidemia who presented 1/22/2020 with confusion.  The patient was just recently discharged from rehab a few days ago.  He lives with his demented wife.  I had a long discussion with the niece Irene and she stated that he was coherent yesterday.  But today he is very confused.  He was found down at his home by oxygen transport staff who called 911 for the patient.  In the ER the patient was found to have urinary tract infection. According to niece she is looking into hospice care which I told her the we can have hospice team to see him in the hospital.  He will be admitted for further management.    1/23 - Patient is confused oriented to self. Mild bleeding around catheter site. Denies pain  1/24 - Complains of dry eyes. No further bleeding from catheter site. Denies pain. Oriented to self and place  1/25 - Patient is asleep arousable. Oriented to self. Denies pain. Long-term friend at bedside reports that patient and wife live by themselves they have a niece who checks on them and is their only next of kin as far as he knows  1/26 - t-max 100.5 yesterday " afternoon. Blood cultures obtained. This x-ray reviewed with no acute pathology. Oriented to self. K 3.5  1/27 - Afebrile. More alert today. Oriented x2. Poor intake. Complains of generalized malaise and fatigue.  1/28 - Palliative team discussion with patient regarding goals.      Consultants/Specialty:  Urology Procedures During Hospitalization:  None        Code Status: DNAR/DNI   VTE prophylaxis: Lovenox    Diet: Diet Order Regular (1:1 supervision)  Supplements  Hospital Day: 6    Disposition: Remain IP      Pat. Class: Inpatient    Assessment/Plan  * Acute metabolic encephalopathy- (present on admission)  Assessment & Plan  1/28 - unclear if it's improving.  Worse than baseline. Palliative Care team evaluating goals.  Patient is a DNR/DNI per his family.  - Previous Plans by Other Providers -  Likely secondary to UTI and dehydration  Overall improved  Avoid sedating agents frequent orientation      JAIRON (acute kidney injury) (HCC)- (present on admission)  Assessment & Plan  1/28 - improving  - Previous Plans by Other Providers -  In setting of chronic kidney disease stage III   Renal function improved          Results from last 7 days   Lab Units 01/27/20 0327 01/26/20 0222 01/25/20 0335 01/24/20 0224 01/23/20  0054 01/22/20  1755   SODIUM 101 mmol/L 138 138 138 140 143 142    mg/dL 22 23* 23* 35* 42* 48*   CREATININE 109 mg/dL 1.60* 1.87* 1.66* 1.82* 1.80* 2.02*   IF TYLER AMER 594135 mL/min/1.73 m 2 50* 42* 48* 43* 44* 38*   IF NON  AMER 109GFRB mL/min/1.73 m 2 41* 35* 40* 36* 36* 32*   MAGNESIUM 561 mg/dL  --   --  2.1 1.7  --   --          Hypokalemia  Assessment & Plan  1/28 - waxes and wanes    Results from last 7 days   Lab Units 01/27/20  0327 01/26/20 0222 01/25/20 0335 01/24/20 0224 01/23/20  0054 01/22/20  1755   POTASSIUM 102 mmol/L 3.8 3.5* 3.5* 3.5* 4.5 4.1   MAGNESIUM 561 mg/dL  --   --  2.1 1.7  --   --          Hypercalcemia- (present on admission)  Assessment & Plan  1/28  - Resolved  - Previous Plans by Other Providers -  Resolved with IV hydration  Results from last 7 days   Lab Units 01/27/20  0327 01/26/20  0222   SODIUM 101 mmol/L 138 138   POTASSIUM 102 mmol/L 3.8 3.5*   CHLORIDE 103 mmol/L 109 106   CO2 104 mmol/L 23 24   GLUCOSE 112 mg/dL 79 86    mg/dL 22 23*   CREATININE 109 mg/dL 1.60* 1.87*   CALCIUM 105 mg/dL 8.9 9.0   ANION GAP ANION  6.0 8.0       Dehydration- (present on admission)  Assessment & Plan  1/28 - resolved  - Previous Plans by Other Providers -  encourage and monitor p.o. intake  Hold diuretics on hold he was severely dehydrated on presentation  Monitor fluid status    Urinary tract infection associated with indwelling urethral catheter (HCC)- (present on admission)  Assessment & Plan  1/28 - resolved - Rosa looks to have been ordered upon arrival for CBI.  - Previous Plans by Other Providers -  Urine culture Pseudomonas  Complete 5-day course of Zosyn      Leukocytosis- (present on admission)  Assessment & Plan  1/28 - resolved  - Previous Plans by Other Providers -  Improved  Results from last 7 days   Lab Units 01/27/20  0327 01/25/20  0335 01/24/20  0224 01/23/20  0054 01/22/20  1755   WBC 1501 K/uL 5.4 4.8 5.1 12.1* 13.2*   PLATELET COUNT 1518 K/uL 141* 156* 142* 172 212         HLD (hyperlipidemia)- (present on admission)  Assessment & Plan  1/28 - cont  - Previous Plans by Other Providers -  Continue simvastatin    HTN (hypertension)- (present on admission)  Assessment & Plan  1/28 - stable  - Previous Plans by Other Providers -  Stable monitor off losartan and Lasix    Hypothyroidism- (present on admission)  Assessment & Plan  1/28 - cont  - Previous Plans by Other Providers -  Continue levothyroxine  TSH was elevated but free T4 was normal continue current dose recheck TFTs in a few weeks when recovered from acute illness and after documenting compliance with medical therapy       Laboratory  Results from last 7 days   Lab Units  01/27/20 0327 01/25/20 0335   WBC 1501 K/uL 5.4 4.8   HGB 1503 g/dL 10.2* 11.1*   HCT 1504 % 31.7* 34.1*   PLATELET COUNT 1518 K/uL 141* 156*    Results from last 7 days   Lab Units 01/27/20 0327 01/26/20 0222 01/25/20 0335 01/24/20 0224   SODIUM 101 mmol/L 138 138 138 140   POTASSIUM 102 mmol/L 3.8 3.5* 3.5* 3.5*   CHLORIDE 103 mmol/L 109 106 106 111   CO2 104 mmol/L 23 24 20 21   ANION GAP ANION  6.0 8.0 12.0* 8.0    mg/dL 22 23* 23* 35*   CREATININE 109 mg/dL 1.60* 1.87* 1.66* 1.82*   CALCIUM 105 mg/dL 8.9 9.0 9.6 9.2   GLUCOSE 112 mg/dL 79 86 90 86   IF TYLER AMER 596389 mL/min/1.73 m 2 50* 42* 48* 43*   IF NON  AMER 109GFRB mL/min/1.73 m 2 41* 35* 40* 36*   MAGNESIUM 561 mg/dL  --   --  2.1 1.7            Vital Signs  Temp:  [36.3 °C (97.4 °F)-36.9 °C (98.5 °F)] 36.3 °C (97.4 °F)  Pulse:  [63-74] 63  Resp:  [16-20] 20  BP: (135-154)/(78-97) 154/83  SpO2:  [96 %-100 %] 96 %   Review of Systems  Review of Systems   Constitutional: Negative for fever and malaise/fatigue.   Skin: Negative for itching and rash.   Psychiatric/Behavioral: Negative for depression. The patient is not nervous/anxious.     Physical Exam  Physical Exam   Constitutional: No distress.   Neurological: He is alert. Coordination normal.   Skin: Skin is warm and dry. He is not diaphoretic. No erythema.   Psychiatric: He has a normal mood and affect. His behavior is normal. Judgment and thought content normal.   Nursing note and vitals reviewed.       Medications  allopurinol, 100 mg, Oral, DAILY  simvastatin, 20 mg, Oral, Nightly  enoxaparin (LOVENOX) injection, 30 mg, Subcutaneous, DAILY  levothyroxine, 88 mcg, Oral, AM ES  omeprazole, 40 mg, Oral, DAILY  tamsulosin, 0.4 mg, Oral, AFTER BREAKFAST  senna-docusate, 2 Tab, Oral, BID       Medications were reviewed today.     Pertinent Imaging Reviewed:   1 -

## 2020-01-29 NOTE — PROGRESS NOTES
Received report and assumed patient care. Patient is AOx1, to self only. No complaints of pain at this time. Assessment complete on RA. All needs met at this time. Safety precautions and hourly rounding in place.

## 2020-01-29 NOTE — DISCHARGE PLANNING
Anticipated Discharge Disposition: TBD     Action: LSW called audie amado .     Barriers to Discharge: none     Plan: LSW will continue to assess for any discharge needs.

## 2020-01-29 NOTE — PROGRESS NOTES
No acute events overnight. Pt due to void. Rosa removed on 1/28 at 1800; bladder scanned at 0100 =267 mL. Hospitalist notified, orders received; re-scanned at 0430 = 191. Next bladder scan due at 0730. Pt remains A&Ox1, on room air. Q2 repositioning. Pt denies pain. Safety precautions maintained. Will continue to monitor and report to the oncoming nurse.

## 2020-01-29 NOTE — PALLIATIVE CARE
Palliative Care follow-up  Call from audie Bonilla noting the has aligned the 24/7 care for Any and Hannah. Her call was forwarded to DOMINIQUE Delcid as well to f/u with DC plan when pt is medically cleared.      Plan: home w/ HH and 24/7 supports    Thank you for allowing Palliative Care to support this patient and family. Contact [phone] for additional assistance, change in patient status, or with any questions/concerns.

## 2020-01-29 NOTE — DOCUMENTATION QUERY
[facility]                                                                       Query Response Note      PATIENT:               ALLI GALLAGHER  ACCT #:                  [id number]  MRN:                     [id number]  :                      1935  ADMIT DATE:       2020 4:30 PM  DISCH DATE:          RESPONDING  PROVIDER #:        [id number]           QUERY TEXT:    Deep tissue pressure injury coccyx, sacrum POA is documented in the  Wound Team Progress Note.  Can you clarify if you agree with this assessment?    NOTE:  If an appropriate response is not listed below, please respond with a new note.      The patient's Clinical Indicators include:  Pt has POA DTI to sacrococcygeal area  Risk Factors: found down at home, dementia  Treatment: wound team eval, mepilex dressing, pressure redistribution mattress, reposition q 2 hours  Options provided:   -- Agree with Wound Care RN assessment   -- Disagree with Wound Care RN assessment   -- Unable to determine      Query created by: Shante Moreira on 2020 9:11 AM    RESPONSE TEXT:    Agree with Wound Care RN assessment          Electronically signed by:  JESSE SUTHERLAND MD 2020 9:28 AM

## 2020-01-29 NOTE — DISCHARGE PLANNING
Anticipated Discharge Disposition: TBD     Action: LSW left vm for Irene.     Barriers to Discharge: none     Plan: LSW will continue to assess for any discharge needs.

## 2020-01-29 NOTE — CARE PLAN
Problem: Skin Integrity  Goal: Risk for impaired skin integrity will decrease  Outcome: PROGRESSING AS EXPECTED  Intervention: Implement precautions to protect skin integrity in collaboration with the interdisciplinary team  Note:   Patient has a high risk for skin breakdown and is on q2 turns, pillows used for positioning, incontinent checks.      Problem: Urinary Elimination:  Goal: Ability to reestablish a normal urinary elimination pattern will improve  Outcome: PROGRESSING SLOWER THAN EXPECTED  Intervention: Assess and monitor for signs and symptoms of urinary retention  Note:   Patient required straight catheterization this morning due to bladder scan above 400.

## 2020-01-29 NOTE — PROGRESS NOTES
3-Way lockhart catheter was removed due to patient no longer having any signs of hematuria. Patient's urine was yellow with some cloudy sediments. Lockhart was removed at exactly 1810 will closely watch for complications post removal.

## 2020-01-29 NOTE — PALLIATIVE CARE
"Palliative Care follow-up  PC visited with patient, wife and audie Bonilla at bedside following discussion with MD.  Plan remains for patient to return home with home health and 24-hour support.  Irene expressed having had a friend lined up to help but have not spoken with him in about 4 days and \"planning to go find him and make sure he is okay.\"  She asked about a list for 24-hour in-home care providers which PC offered to locate for her.  Code status confirmed as DNR/DNI.  Despite the patient's dementia, he confirmed days as his wishes as well, along with wife Hannah who also agrees.  POLST completed for this along with selective treatment and no feeding tubes.  Form was reviewed and signed by audie Bonilla.  Irene plans to leave for \"about an hour\" and will, get Hannah to return home when she is done.  PCA list received from DOMINIQUE and left with Hannah at bedside.  POLST reviewed and signed by MD.  Copy scanned into EMR and original on the chart for time of discharge.     Updated: DOMINIQUE/NICK RN/MD    Plan: home with  and 24-hour supports when arranged    Thank you for allowing Palliative Care to support this patient and family. Contact [phone] for additional assistance, change in patient status, or with any questions/concerns.   "

## 2020-01-29 NOTE — WOUND TEAM
[facility]  Inpatient Services  Initial Wound and Skin Care Evaluation    Admission Date: 1/22/2020     Consult Date: 1/28/20 @ 0405   HPI, PMH, SH: Reviewed    Unit where seen by Wound Team: [id number]     WOUND CONSULT RELATED TO:  Sacral/coccyx      Self Report / Pain Level:  C/o pain with turning       OBJECTIVE:  On waffle overlay, pt needed assistance to turn, but kept self turned    WOUND TYPE, LOCATION, CHARACTERISTICS (Pressure Injuries: location, stage, POA or date identified)    Pressure Injury 01/28/20 Coccyx;Sacrum (Active)       1/28 0402        1/28 1000        Pressure Injury Stage DTPI    Site Assessment Light purple;Red    Vikki-wound Assessment Red    Margins Defined edges    Wound Length (cm) 5 cm 1/28/2020 10:00 AM   Wound Width (cm) 2.5 cm 1/28/2020 10:00 AM   Wound Depth (cm) 0 cm 1/28/2020 10:00 AM   Wound Surface Area (cm^2) 12.5 cm^2 1/28/2020 10:00 AM   Tunneling 0 cm 1/28/2020 10:00 AM   Undermining 0 cm 1/28/2020 10:00 AM   Drainage Amount None    Treatments Cleansed    Cleansing Normal Saline Irrigation    Periwound Protectant Not Applicable    Dressing Options Mepilex    Dressing Cleansing/Solutions Not Applicable    Dressing Changed New    Dressing Status Intact    Dressing Change Frequency Every 72 hrs    NEXT Dressing Change  01/31/20    NEXT Weekly Photo (Inpatient Only) 02/04/20    Odor None     Exposed Structures None     Tissue Type and Percentage 100% purple/red         Vascular:  Wound not r/t vascular    Lab Values:    Lab Results   Component Value Date/Time    WBC 5.4 01/27/2020 03:27 AM    RBC 3.30 (L) 01/27/2020 03:27 AM    HEMOGLOBIN 10.2 (L) 01/27/2020 03:27 AM    HEMATOCRIT 31.7 (L) 01/27/2020 03:27 AM              No results found for: HBA1C        Culture:   na    INTERVENTIONS BY WOUND TEAM:  Pt turned to L side, assessed sacrococcygeal area, applied mepilex    Interdisciplinary consultation: Patient, Bedside RN    EVALUATION:pt has POA DTI to  sacrococcygeal area, possible clear blister forming to L side of area, skin soft, no fluid yet. R IT red but blanching.     Goals: Maintain intact skin until DTI revals    NURSING PLAN OF CARE ORDERS (X):  Dressing changes: See Dressing Care orders:   Skin care: See Skin Care orders: x  Rectal tube care: See Rectal Tube Care orders:        Other orders:                           RSKIN:   CURRENTLY IN PLACE (X), APPLIED THIS VISIT (A), ORDERED (O):   Q shift Chon:  X  Q shift pressure point assessments:  X  Pressure redistribution mattress  x                           Low Airloss                        Bariatric JAGJIT                     Bariatric foam                        Heel float boots                     Heel Silicone dressing                         Float Heels off Bed with Pillows  o             Barrier wipes         Barrier Cream         Barrier paste          Sacral silicone dressing   a      Silicone O2 tubing         Anchorfast         Cannula fixation Device (Tender )          Gray Foam Ear protectors           Trach with Optifoam split foam                 Waffle cushion        Waffle Overlay    a     Rectal tube or BMS    Purwick/Condom Cath          Antifungal tx      Interdry          Reposition q 2 hours  x      Up to chair        Ambulate      PT/OT        Dietician        Diabetes Education      PO x    TF     TPN     NPO   # days   Other        WOUND TEAM PLAN OF CARE (X):   Dressing changes by wound team:          Follow up 1-2 times weekly:               Follow up 3 times weekly:                NPWT change 3 times weekly:     Follow up as needed:  x     Other (explain):     Anticipated discharge plans (X):   LTACH:        SNF/Rehab:                  Home Care:           Outpatient Wound Center:            Self Care:            Other:  May need wound care, awaiting DTI reveal

## 2020-01-29 NOTE — DISCHARGE PLANNING
Received Choice form at 6553  Agency/Facility Name: [facility]   Referral sent per Choice form @ 1515

## 2020-01-30 VITALS
HEART RATE: 61 BPM | SYSTOLIC BLOOD PRESSURE: 147 MMHG | WEIGHT: 159.17 LBS | RESPIRATION RATE: 18 BRPM | DIASTOLIC BLOOD PRESSURE: 87 MMHG | TEMPERATURE: 97.2 F | HEIGHT: 70 IN | BODY MASS INDEX: 22.79 KG/M2 | OXYGEN SATURATION: 96 %

## 2020-01-30 LAB
BACTERIA BLD CULT: NORMAL
BACTERIA BLD CULT: NORMAL
SIGNIFICANT IND 70042: NORMAL
SIGNIFICANT IND 70042: NORMAL
SITE SITE: NORMAL
SITE SITE: NORMAL
SOURCE SOURCE: NORMAL
SOURCE SOURCE: NORMAL

## 2020-01-30 PROCEDURE — 700102 HCHG RX REV CODE 250 W/ 637 OVERRIDE(OP): Performed by: INTERNAL MEDICINE

## 2020-01-30 PROCEDURE — A9270 NON-COVERED ITEM OR SERVICE: HCPCS | Performed by: HOSPITALIST

## 2020-01-30 PROCEDURE — 700102 HCHG RX REV CODE 250 W/ 637 OVERRIDE(OP): Performed by: HOSPITALIST

## 2020-01-30 PROCEDURE — 99239 HOSP IP/OBS DSCHRG MGMT >30: CPT | Performed by: STUDENT IN AN ORGANIZED HEALTH CARE EDUCATION/TRAINING PROGRAM

## 2020-01-30 PROCEDURE — A9270 NON-COVERED ITEM OR SERVICE: HCPCS | Performed by: INTERNAL MEDICINE

## 2020-01-30 PROCEDURE — 51798 US URINE CAPACITY MEASURE: CPT

## 2020-01-30 PROCEDURE — 700111 HCHG RX REV CODE 636 W/ 250 OVERRIDE (IP): Performed by: HOSPITALIST

## 2020-01-30 RX ADMIN — ALLOPURINOL 100 MG: 100 TABLET ORAL at 04:22

## 2020-01-30 RX ADMIN — TAMSULOSIN HYDROCHLORIDE 0.4 MG: 0.4 CAPSULE ORAL at 09:21

## 2020-01-30 RX ADMIN — LEVOTHYROXINE SODIUM 88 MCG: 88 TABLET ORAL at 04:23

## 2020-01-30 RX ADMIN — OMEPRAZOLE 40 MG: 20 CAPSULE, DELAYED RELEASE ORAL at 04:22

## 2020-01-30 RX ADMIN — ENOXAPARIN SODIUM 30 MG: 100 INJECTION SUBCUTANEOUS at 04:22

## 2020-01-30 NOTE — DISCHARGE PLANNING
Anticipated Discharge Disposition: Home with      Action: LSW left another vm for Irene     Barriers to Discharge: none     Plan: LSW will continue to assess for any discharge needs.

## 2020-01-30 NOTE — PROGRESS NOTES
Assumed care of pt at shift change. A/Oxto self, discussed plan of care. Pt on room air. Denied pain. Pt is confused and attempted to get out of bed multiple time this shift. Reoriented pt. No learning evidence. Bed alarm in place. Bed build alarm in place also.       All needs met at this time. Bed in lowest position, treaded socks on, personal belongings and call light within reach, instructed to call for any assistance, hourly rounding in place.

## 2020-01-30 NOTE — DISCHARGE PLANNING
Agency/Facility Name: [facility]  Spoke To: Crystal  Outcome: Patient's referral accepted.  If patient is medically clear and ready for discharge, [facility] can see patient today.    CHERYL Delcid notified.

## 2020-01-30 NOTE — PROGRESS NOTES
2 RN skin check complete with, MARY Boggs.   Devices in place: PIV  Skin assessed under devices: yes.  Confirmed pressure ulcers found on: sacrum.  New potential pressure ulcers noted on NA. Wound consult placed NA.  The following skin issues noted: Heels are dry and red. Mepilex applied. Sacrum is light purple not blanching. Mepilex applied. Right [id number] toe red and slow to blanching. Foam dressing in place.   The following interventions in place: Q 2 turn, waffle overlay in place. Deidra cream applied. Frequent check for incontinence. Keep pt dry and clean. 2 RN skin check in place.

## 2020-01-30 NOTE — PROGRESS NOTES
Discharging Patient home per physician order.  Pt niece came to  pt.  Education given to pt's niece due to pt's confusion status.  Demonstrated understanding of discharge instructions, follow up appointments, home medications, prescriptions, nursing care instructions for pressure injury on sacrum. Educated pt t family to turn pt every 2 hours. Ambulating with assistance,  Pt hasn't voided so far this shift. Bladder scan ordered.  Pt had 860 retention. Notified  Dr Gonzalez. Order to start Rosa with Leg bag. Pt had 860 ml output with Rosa cathter and changed to leg bag. Educated pt's family and friend to follow up with Urologies t with in two weeks. Appointment made on 02/11 per pt's friend. Updates given to primary doctor's office through phone.   pain well controlled, tolerating oral medications, oxygen saturation greater than 90% , tolerating diet.  IV removed. Pt niece signed discharge paper work for pt. Verbalized understanding of discharge instructions and educational handouts.  All questions answered.  Belongings with patient at time of discharge.

## 2020-01-30 NOTE — DISCHARGE PLANNING
Anticipated Discharge Disposition: Home with HH     Action: LSW called Irene to see when she can  the pt for discharge.     Barriers to Discharge: none     Plan: LSW will continue to assess for any discharge needs.

## 2020-01-30 NOTE — PROGRESS NOTES
Assumed patient care @1900, no report received.     Patient assessed, pt is A&Ox1 (self), confused, 20g.PIV patent/flushed/saline locked. Pt has no c/o pain/nausea/SOB at this time.     VSS on RA. Bladder scanned - 298 ml. No intervention needed per orders.    Evening medications administered, Plan of care discussed-all questions answered, call light w/i reach, bed in lowest/locked position, Q2 rounding in place.

## 2020-01-30 NOTE — DISCHARGE PLANNING
Anticipated Discharge Disposition: Home with HH     Action: LSW got a call from Irene stating that she would come  the pt around 4:00pm today.     Barriers to Discharge: none     Plan: LSW will continue to assess for any discharge needs.       UPDATE: LSW notified bedside RN and MD via tiger text.

## 2020-01-30 NOTE — CARE PLAN
Problem: Safety  Goal: Will remain free from falls  Outcome: PROGRESSING AS EXPECTED  Intervention: Implement fall precautions  Flowsheets  Taken 1/29/2020 0833 by Emmanuelle Mccollum RANTONIO  Environmental Precautions: Treaded Slipper Socks on Patient;Transferred to Stronger Side;Personal Belongings, Wastebasket, Call Bell etc. in Easy Reach;Report Given to Other Health Care Providers Regarding Fall Risk;Bed in Low Position;Communication Sign for Patients & Families;Mobility Assessed & Appropriate Sign Placed  Taken 1/30/2020 0120 by Sean Maradiaga RANTONIO  Chair/Bed Strip Alarm: Yes - Alarm On  Note:   Fall precautions in place.      Problem: Communication  Goal: The ability to communicate needs accurately and effectively will improve  Outcome: PROGRESSING SLOWER THAN EXPECTED  Intervention: Reorient patient to environment as needed  Note:   Patient oriented to self only at this time.

## 2020-01-30 NOTE — DISCHARGE SUMMARY
"Discharge Summary    CHIEF COMPLAINT ON ADMISSION  Chief Complaint   Patient presents with   • Fall       Reason for Admission  EMS      Admission Date  1/22/2020    CODE STATUS  DNAR/DNI    HPI & HOSPITAL COURSE  ER Course  Efra Soto is a 84 y.o. male w/ pmhx of dementia, BPH, HLD, hypothyroidism and recent personality/behavioral changes who presents to the ED after being found on the floor of his home with the gas stove on. Niece provided history- states that earlier today an oxygen supply company arrived at the home to drop of supplemental oxygen. When they arrived patient was noted to be on the floor presumably having fallen out of his wheelchair.  It was also noted that the gas stove had been left on for an unknown amount of time. Fire department was called and patient was brought to the ED. Patient does not recall the event. Wife is present at time of interview as well but also has dementia and does not recall the events of the day. Patient was recently discharged from [facility] 3 days ago after being hospitalized back in October 2019 for encephalopathy of unknown cause. Niece reports that patient began having difficulty with speech, mentation and behavioral issues since 09/2019. After hospitalization in 10/209 neice states he has only become worse but admitted some days are better than others.      In the ED he has no complaints of pain (chest pain, abdominal pain, HA, BLE pain). He states he is having a hard time remembering what is going on- states \"it has been a lot.\".     Per chart review: admission 10/2019 had \"MRI and showed possible restricted diffusion in the bilateral parietal occipital region which carries a extensive differential including Creutzfeldt Thang, metabolic encephalopathy, or potential seizure activity, the patient was evaluated by a neurologist who suggested these imaging findings are secondary to an underlying metabolic encephalopathy due to acute kidney " "failure and uremia and the neurologist recommended follow-up with repeat MRI if there is no improvement. Also the patient had acute kidney injury with creatinine around 6 due to obstructive uropathy, was evaluated by urologist who recommended starting Flomax and CBI which showed some clot, and no cystoscopy was done and the urologist recommended to follow-up with the clinic in 2 weeks for voiding trial and removing the Rosa, however his urine got cleard and no more clots or blood, and his creatinine has improved significantly from 6.4 to1.5 with improving and his mental status.\"     Since this admission Nitootie reports she is unsure if he has had any follow up with the urologist. She does not know when the Rosa was last changed. Patient did have a repeat MRI 12/3/20 that showed not change from previous.    Admission Day Course - 1/22  84 y.o. male with past medical history of essential hypertension, hypothyroid, BPH, dyslipidemia who presented 1/22/2020 with confusion.  The patient was just recently discharged from rehab a few days ago.  He lives with his demented wife.  I had a long discussion with the niece Irene and she stated that he was coherent yesterday.  But today he is very confused.  He was found down at his home by oxygen transport staff who called 911 for the patient.  In the ER the patient was found to have urinary tract infection. According to niece she is looking into hospice care which I told her the we can have hospice team to see him in the hospital.  He will be admitted for further management.    1/23 - Patient is confused oriented to self. Mild bleeding around catheter site. Denies pain  1/24 - Complains of dry eyes. No further bleeding from catheter site. Denies pain. Oriented to self and place  1/25 - Patient is asleep arousable. Oriented to self. Denies pain. Long-term friend at bedside reports that patient and wife live by themselves they have a niece who checks on them and is their only " next of kin as far as he knows  1/26 - t-max 100.5 yesterday afternoon. Blood cultures obtained. This x-ray reviewed with no acute pathology. Oriented to self. K 3.5  1/27 - Afebrile. More alert today. Oriented x2. Poor intake. Complains of generalized malaise and fatigue.  1/28 - Palliative team discussion with patient regarding goals.  1/29 - PT/OT evals placed.  Stable. Family arranging home care, Home Health acceptance pending  1/30 - Patient is discharged to the care of his family.    Consultants/Specialty:  Urology Procedures During Hospitalization:  None       Discharge Day Exam    Review of Systems  ROS Physical Exam  Physical Exam     Vital Signs  Temp:  [36.2 °C (97.2 °F)-36.4 °C (97.6 °F)] 36.2 °C (97.2 °F)  Pulse:  [60-74] 61  Resp:  [18-20] 18  BP: (137-152)/(79-88) 147/87  SpO2:  [96 %-98 %] 96 %    Discharge Day Assessment/Plan  * Acute metabolic encephalopathy- (present on admission)  Assessment & Plan  1/30 - Patient medically stable to discharge home with family. Patient's mentation isn't particularly improving, but there's very little to do medically left.  Family has arranged 24 hour home care, home health services approved, discharging  1/28-1/29 - unclear if it's improving.  Worse than baseline. Palliative Care team evaluating goals.  Patient is a DNR/DNI per his family.  - Previous Plans by Other Providers -  Likely secondary to UTI and dehydration  Overall improved  Avoid sedating agents frequent orientation      JAIRON (acute kidney injury) (HCC)- (present on admission)  Assessment & Plan  1/30 - medically stable for discharge home to the care of his family with home health services  1/28-1/29 - improving  - Previous Plans by Other Providers -  In setting of chronic kidney disease stage III   Renal function improved          Results from last 7 days   Lab Units 01/27/20  0327 01/26/20  0222 01/25/20  0335 01/24/20  0224 01/23/20  0054 01/22/20  1755   SODIUM 101 mmol/L 138 138 138 140 143 142     mg/dL 22 23* 23* 35* 42* 48*   CREATININE 109 mg/dL 1.60* 1.87* 1.66* 1.82* 1.80* 2.02*   IF TYLER AMER 088886 mL/min/1.73 m 2 50* 42* 48* 43* 44* 38*   IF NON  AMER 109GFRB mL/min/1.73 m 2 41* 35* 40* 36* 36* 32*   MAGNESIUM 561 mg/dL  --   --  2.1 1.7  --   --          Hypokalemia  Assessment & Plan  1/28-1/30 - waxes and wanes    Results from last 7 days   Lab Units 01/27/20  0327 01/26/20  0222 01/25/20  0335 01/24/20  0224 01/23/20  0054 01/22/20  1755   POTASSIUM 102 mmol/L 3.8 3.5* 3.5* 3.5* 4.5 4.1   MAGNESIUM 561 mg/dL  --   --  2.1 1.7  --   --          Hypercalcemia- (present on admission)  Assessment & Plan  1/28-1/30 - Resolved  - Previous Plans by Other Providers -  Resolved with IV hydration  Results from last 7 days   Lab Units 01/27/20 0327 01/26/20 0222   SODIUM 101 mmol/L 138 138   POTASSIUM 102 mmol/L 3.8 3.5*   CHLORIDE 103 mmol/L 109 106   CO2 104 mmol/L 23 24   GLUCOSE 112 mg/dL 79 86    mg/dL 22 23*   CREATININE 109 mg/dL 1.60* 1.87*   CALCIUM 105 mg/dL 8.9 9.0   ANION GAP ANION  6.0 8.0       Dehydration- (present on admission)  Assessment & Plan  1/28-1/30 - resolved  - Previous Plans by Other Providers -  encourage and monitor p.o. intake  Hold diuretics on hold he was severely dehydrated on presentation  Monitor fluid status    Urinary tract infection associated with indwelling urethral catheter (HCC)- (present on admission)  Assessment & Plan  1/28-1/30 - resolved - Rosa looks to have been ordered upon arrival for CBI.  - Previous Plans by Other Providers -  Urine culture Pseudomonas  Complete 5-day course of Zosyn      Leukocytosis- (present on admission)  Assessment & Plan  1/28-1/30 - resolved  - Previous Plans by Other Providers -  Improved  Results from last 7 days   Lab Units 01/27/20  0327 01/25/20  0335 01/24/20  0224   WBC 1501 K/uL 5.4 4.8 5.1   PLATELET COUNT 1518 K/uL 141* 156* 142*         HLD (hyperlipidemia)- (present on admission)  Assessment &  Plan  1/28-1/30 - cont  - Previous Plans by Other Providers -  Continue simvastatin    HTN (hypertension)- (present on admission)  Assessment & Plan  1/28-1/30 - stable  - Previous Plans by Other Providers -  Stable monitor off losartan and Lasix    Hypothyroidism- (present on admission)  Assessment & Plan  1/28-1/30 - cont  - Previous Plans by Other Providers -  Continue levothyroxine  TSH was elevated but free T4 was normal continue current dose recheck TFTs in a few weeks when recovered from acute illness and after documenting compliance with medical therapy           Therefore, he is discharged in fair and stable condition to home with organized home healthcare and close outpatient follow-up.    The patient met 2-midnight criteria for an inpatient stay at the time of discharge.    Discharge Date  01/30/20      FOLLOW UP ITEMS POST DISCHARGE  None    DISCHARGE DIAGNOSES  Principal Problem:    Acute metabolic encephalopathy POA: Yes  Active Problems:    JAIRON (acute kidney injury) (HCC) POA: Yes    HLD (hyperlipidemia) POA: Yes    Leukocytosis POA: Yes    Urinary tract infection associated with indwelling urethral catheter (HCC) POA: Yes    Dehydration POA: Yes    Hypercalcemia POA: Yes    Hypokalemia POA: Unknown    Hypothyroidism POA: Yes    HTN (hypertension) POA: Yes  Resolved Problems:    * No resolved hospital problems. *      FOLLOW UP  No future appointments.  Vini Menjivar M.D.  [address] [address]  [phone]    Schedule an appointment as soon as possible for a visit  Post hospitalization follow-up, [facility]      MEDICATIONS ON DISCHARGE     Medication List      CONTINUE taking these medications      Instructions   allopurinol 100 MG Tabs  Commonly known as:  ZYLOPRIM   Take 100 mg by mouth every day.  Dose:  100 mg     fluocinonide 0.05 % Crea  Commonly known as:  LIDEX   Apply  to affected area(s) 2 times a day. Apply sparingly to both legs twice a day as  needed for rash     furosemide 20 MG Tabs  Commonly known as:  LASIX   Take 20 mg by mouth every morning.  Dose:  20 mg     levothyroxine 88 MCG Tabs  Commonly known as:  SYNTHROID   Take 88 mcg by mouth Every morning on an empty stomach.  Dose:  88 mcg     losartan 100 MG Tabs  Commonly known as:  COZAAR   Take 100 mg by mouth every day.  Dose:  100 mg     omeprazole 20 MG delayed-release capsule  Commonly known as:  PRILOSEC   Take 40 mg by mouth every day. Indications: Gastroesophageal Reflux Disease  Dose:  40 mg     simvastatin 20 MG Tabs  Commonly known as:  ZOCOR   Take 20 mg by mouth every evening.  Dose:  20 mg     tamsulosin 0.4 MG capsule  Commonly known as:  FLOMAX   Take 1 Cap by mouth ONE-HALF HOUR AFTER BREAKFAST.  Dose:  0.4 mg     therapeutic multivitamin-minerals Tabs   Take 1 Tab by mouth every day.  Dose:  1 Tab     VITAMIN D3 PO   Take 1 Dose by mouth every day. Unknown OTC Strength.  Dose:  1 Dose            Allergies  No Known Allergies    DIET  Orders Placed This Encounter   Procedures   • Diet Order Regular (1:1 supervision)     Standing Status:   Standing     Number of Occurrences:   1     Order Specific Question:   Diet:     Answer:   Regular [1]     Comments:   1:1 supervision       ACTIVITY  As tolerated.  Weight bearing as tolerated        LABORATORY  Lab Results   Component Value Date    SODIUM 138 01/27/2020    POTASSIUM 3.8 01/27/2020    CHLORIDE 109 01/27/2020    CO2 23 01/27/2020    GLUCOSE 79 01/27/2020    BUN 22 01/27/2020    CREATININE 1.60 (H) 01/27/2020        Lab Results   Component Value Date    WBC 5.4 01/27/2020    HEMOGLOBIN 10.2 (L) 01/27/2020    HEMATOCRIT 31.7 (L) 01/27/2020    PLATELETCT 141 (L) 01/27/2020        Total time of the discharge process exceeds 35 minutes.

## 2020-01-30 NOTE — DISCHARGE INSTRUCTIONS
Discharge Instructions per Lawrence Gonzalez D.O.    I have no further instructions for you.  Godspeed.    DIET: Diet Order Regular (1:1 supervision)  Supplements    ACTIVITY: as able    DIAGNOSIS: Acute metabolic encephalopathy    Return to ER if needed      Discharge Instructions    Discharged to home by car with relative. Discharged via wheelchair, hospital escort: Yes.  Special equipment needed: Not Applicable    Be sure to schedule a follow-up appointment with your primary care doctor or any specialists as instructed.     Discharge Plan:   Influenza Vaccine Indication: Not indicated: Previously immunized this influenza season and > 8 years of age    I understand that a diet low in cholesterol, fat, and sodium is recommended for good health. Unless I have been given specific instructions below for another diet, I accept this instruction as my diet prescription.   Other diet: Regular diet    Special Instructions: None    · Is patient discharged on Warfarin / Coumadin?   No       Acute Urinary Retention, Male  Acute urinary retention is when you are unable to pee (urinate). Acute urinary retention is common in older men. Prostates can get bigger, which blocks the flow of pee.  Follow these instructions at home:  · Drink enough fluids to keep your pee clear or pale yellow.  · If you are sent home with a tube that drains the bladder (catheter), there will be a drainage bag attached to it. There are two types of bags. One is big that you can wear at night without having to empty it. One is smaller and needs to be emptied more often.  ¨ Keep the drainage bag empty.  ¨ Keep the drainage bag lower than your catheter.  · Only take medicine as told by your doctor.  Contact a doctor if:  · You have a low-grade fever.  · You have spasms or you are leaking pee when you have spasms.  Get help right away if:  · You have chills or a fever.  · Your catheter stops draining pee.  · Your catheter falls out.  · You have increased  bleeding that does not stop after you have rested and increased the amount of fluids you had been drinking.  This information is not intended to replace advice given to you by your health care provider. Make sure you discuss any questions you have with your health care provider.  Document Released: 06/05/2009 Document Revised: 05/25/2017 Document Reviewed: 05/29/2014  [employer] Interactive Patient Education © 2017 [employer]         Toxic Metabolic Encephalopathy  Toxic metabolic encephalopathy (TME) is a type of brain disorder caused by a change in brain chemistry. This condition may result from illnesses or conditions that cause an imbalance of fluid, minerals (electrolytes), and other substances in the body that affect the way the brain functions. It is not caused by brain damage or brain disease.  TME can cause confusion and other mental disturbances, which are generally referred to as delirium. Untreated delirium may lead to permanent mental changes or worsening medical conditions. Untreated delirium is a life-threatening condition that may need to be treated in the hospital.  What are the causes?  Possible causes of TME that can lead to delirium include:  · Short-term (acute) or long-term (chronic) disease of the kidney or liver.  · Not having enough fluid in the body (dehydration).  · Changes in the acid level (pH) of the blood.  · High or low levels of any of the following substances in the blood:  ¨ Calcium.  ¨ Salt (sodium).  ¨ Sugar (glucose).  ¨ Magnesium.  ¨ Phosphate.  · High body temperature.  · Not having enough oxygen in the blood.  · Low levels of B vitamins. This can result from alcohol abuse.  · Certain medicines, such as steroids and medicines that reduce the activity of the immune system (immunosuppressants).  · Certain infections.  What increases the risk?  You may have a higher risk for TME if you:  · Are elderly.  · Have dementia.  · Are in the hospital, especially in intensive  care.  · Live in a nursing home.  · Had recent surgery.  · Have liver or kidney disease.  · Have poorly controlled diabetes.  · Have chronic medical problems, especially heart or lung disease.  · Are not getting enough fluids.  · Have poor nutrition.  · Abuse alcohol.  What are the signs or symptoms?  Symptoms of TME may include:  · Muscle stiffness or jerking (spasticity).  · Shaking (tremors).  · Flapping of the hands.  · Weakness.  · Clumsiness.  · Slowed breathing.  · Jerky movements that you cannot control (seizures).  · Not being able to stay awake (drowsiness).  · Not being able to pay attention.  · Loss of consciousness (coma).  Symptoms of delirium caused by TME include:  · Confusion.  · Difficulty focusing or concentrating, or inability to focus or concentrate.  · Not knowing where you are (disorientation).  · Seeing or hearing things that are not real (hallucinations).  · Fearfulness.  · False beliefs (delusions).  · Changes in mood or personality.  · Changes in speech, such as saying things that do not make sense.  · Memory loss.  · Irritability.  · Avoiding other people (withdrawal).  · Depression.  · Poor judgment.  · Changes in eating and sleeping patterns.  · Hyperactivity.  · Decreased alertness.  · General mistrust of others (paranoia).  Delirium may come and go. Symptoms of delirium may start suddenly or gradually, and they often get worse at night.  How is this diagnosed?  This condition is diagnosed based on:  · Your symptoms and behavior.  · An exam to check how you are thinking, feeling, and behaving (mental status exam). To diagnose delirium, the mental status exam must rule out other possible causes of TME, and must show:  ¨ Changes in attention and awareness.  ¨ Changes that develop over a short period of time and tend to come and go (fluctuate).  ¨ Changes in memory, language, and thinking that were not present before.  · A physical exam.  · Imaging tests, such as:  ¨ MRI.  ¨ CT  scan.  · Blood tests to:  ¨ Measure liver and kidney function.  ¨ Check for a lack (deficiency) of vitamin B.  ¨ Check for changes in acid levels (pH) and changes in calcium, sodium, or magnesium levels in the blood.  ¨ Measure your blood sugar (glucose).  ¨ Measure your blood oxygen level.  How is this treated?  Treatment for TME depends on the cause, and it may include.  · Getting fluids through an IV tube.  · Regulating calcium, sodium, glucose, or magnesium levels in the body.  · Getting oxygen.  · Improving nutrition.  · Treating liver or kidney disease.  · Adjusting certain medicines.  · Treating infections.  If the cause is found and treated, delirium usually improves. Managing delirium may include:  · Keeping the room well-lit and quiet.  · Using calendars, pictures, and clocks to prevent disorientation.  · Having frequent checks from nursing staff and visits from caregivers.  · Wearing eyeglasses or a hearing aid, if needed.  · Physical therapy.  · Medicine to treat agitation, anxiety, hallucinations, or delusions.  Follow these instructions at home:  · Drink enough fluid to keep your urine clear or pale yellow.  · Take over-the-counter and prescription medicines only as told by your health care provider.  · Return to your normal activities as told by your health care provider. Ask your health care provider what activities are safe for you.  · Follow a healthy diet. Do not skip meals.  · Do not drink alcohol.  · Go to bed at the same time every night.  · Keep all follow-up visits as told by your health care provider. This is important.  Contact a health care provider if:  · You are unable to feed yourself or hydrate yourself.  · You need help at home.  · You start to feel clumsy.  · You start to have tremors or weakness.  Get help right away if:  · You have a seizure.  · You lose consciousness.  · You have trouble breathing.  · You do not feel able to care for yourself at home.  · You have a fever.  · You  become disoriented at home.  This information is not intended to replace advice given to you by your health care provider. Make sure you discuss any questions you have with your health care provider.  Document Released: 05/26/2017 Document Revised: 08/21/2017 Document Reviewed: 05/26/2017  [employer] Interactive Patient Education © 2017 [employer]    Depression / Suicide Risk    As you are discharged from this [facility] facility, it is important to learn how to keep safe from harming yourself.    Recognize the warning signs:  · Abrupt changes in personality, positive or negative- including increase in energy   · Giving away possessions  · Change in eating patterns- significant weight changes-  positive or negative  · Change in sleeping patterns- unable to sleep or sleeping all the time   · Unwillingness or inability to communicate  · Depression  · Unusual sadness, discouragement and loneliness  · Talk of wanting to die  · Neglect of personal appearance   · Rebelliousness- reckless behavior  · Withdrawal from people/activities they love  · Confusion- inability to concentrate     If you or a loved one observes any of these behaviors or has concerns about self-harm, here's what you can do:  · Talk about it- your feelings and reasons for harming yourself  · Remove any means that you might use to hurt yourself (examples: pills, rope, extension cords, firearm)  · Get professional help from the community (Mental Health, Substance Abuse, psychological counseling)  · Do not be alone:Call your Safe Contact- someone whom you trust who will be there for you.  · Call your local CRISIS HOTLINE 357-4102 or 332-258-0457  · Call your local Children's Mobile Crisis Response Team [place] (677) 874-3575 or www.[employer]  · Call the toll free National Suicide Prevention Hotlines   · National Suicide Prevention Lifeline 354-875-PRZV (5628)  · National Hope Line Network 800-SUICIDE (871-2301)

## 2020-01-30 NOTE — DISCHARGE PLANNING
Agency/Facility Name: [facility]  Spoke To: Crystal  Outcome: Notified Crystal that patient will discharge today.  [facility] will start home health care tomorrow, 1/31/2020.    CHERYL Delcid notified.

## 2020-01-30 NOTE — CARE PLAN
Problem: Safety  Goal: Will remain free from falls  Outcome: PROGRESSING AS EXPECTED  Intervention: Implement fall precautions  Note:   Bed alarm in place. Bed in lowest position, treaded socks on, personal belongings and call light within reach, instructed to call for any assistance, hourly rounding in place.      Problem: Skin Integrity  Goal: Risk for impaired skin integrity will decrease  Outcome: PROGRESSING AS EXPECTED  Intervention: Implement precautions to protect skin integrity in collaboration with the interdisciplinary team  Note:   Q 2 turn. Deidra cream applied. Frequent check for incontinence. Keep pt dry and clean. 2 RN skin check in place.

## 2020-01-31 NOTE — DISCHARGE PLANNING
Anticipated Discharge Disposition: Home with HH     Action: CORALW received call from babs Bonilla's niece stating that she did not know the name of the HH agency or when they were planning on coming to see him. LSW gave contact information and updated that the HH will see him sometime today.

## 2020-02-16 ENCOUNTER — HOSPITAL ENCOUNTER (EMERGENCY)
Facility: MEDICAL CENTER | Age: 85
End: 2020-02-16
Attending: EMERGENCY MEDICINE
Payer: MEDICARE

## 2020-02-16 VITALS
SYSTOLIC BLOOD PRESSURE: 138 MMHG | TEMPERATURE: 97.8 F | DIASTOLIC BLOOD PRESSURE: 95 MMHG | HEIGHT: 70 IN | HEART RATE: 95 BPM | BODY MASS INDEX: 23.62 KG/M2 | OXYGEN SATURATION: 95 % | RESPIRATION RATE: 21 BRPM | WEIGHT: 165 LBS

## 2020-02-16 DIAGNOSIS — E86.0 DEHYDRATION: ICD-10-CM

## 2020-02-16 DIAGNOSIS — F03.90 DEMENTIA WITHOUT BEHAVIORAL DISTURBANCE, UNSPECIFIED DEMENTIA TYPE: ICD-10-CM

## 2020-02-16 DIAGNOSIS — R41.82 ALTERED MENTAL STATUS, UNSPECIFIED ALTERED MENTAL STATUS TYPE: ICD-10-CM

## 2020-02-16 DIAGNOSIS — N30.90 CYSTITIS: ICD-10-CM

## 2020-02-16 DIAGNOSIS — N28.9 RENAL INSUFFICIENCY: ICD-10-CM

## 2020-02-16 DIAGNOSIS — Z66 DNR (DO NOT RESUSCITATE): ICD-10-CM

## 2020-02-16 LAB
ALBUMIN SERPL BCP-MCNC: 3.6 G/DL (ref 3.2–4.9)
ALBUMIN/GLOB SERPL: 0.9 G/DL
ALP SERPL-CCNC: 58 U/L (ref 30–99)
ALT SERPL-CCNC: 10 U/L (ref 2–50)
ANION GAP SERPL CALC-SCNC: 9 MMOL/L (ref 0–11.9)
APPEARANCE UR: ABNORMAL
AST SERPL-CCNC: 19 U/L (ref 12–45)
BASOPHILS # BLD AUTO: 1.3 % (ref 0–1.8)
BASOPHILS # BLD: 0.13 K/UL (ref 0–0.12)
BILIRUB SERPL-MCNC: 0.9 MG/DL (ref 0.1–1.5)
BUN SERPL-MCNC: 50 MG/DL (ref 8–22)
CALCIUM SERPL-MCNC: 10.3 MG/DL (ref 8.5–10.5)
CHLORIDE SERPL-SCNC: 106 MMOL/L (ref 96–112)
CO2 SERPL-SCNC: 25 MMOL/L (ref 20–33)
COLOR UR AUTO: YELLOW
CREAT SERPL-MCNC: 2.65 MG/DL (ref 0.5–1.4)
EOSINOPHIL # BLD AUTO: 0.58 K/UL (ref 0–0.51)
EOSINOPHIL NFR BLD: 5.8 % (ref 0–6.9)
ERYTHROCYTE [DISTWIDTH] IN BLOOD BY AUTOMATED COUNT: 51.1 FL (ref 35.9–50)
GLOBULIN SER CALC-MCNC: 4.1 G/DL (ref 1.9–3.5)
GLUCOSE SERPL-MCNC: 134 MG/DL (ref 65–99)
GLUCOSE UR QL STRIP.AUTO: NEGATIVE MG/DL
HCT VFR BLD AUTO: 37.8 % (ref 42–52)
HGB BLD-MCNC: 12 G/DL (ref 14–18)
IMM GRANULOCYTES # BLD AUTO: 0.11 K/UL (ref 0–0.11)
IMM GRANULOCYTES NFR BLD AUTO: 1.1 % (ref 0–0.9)
KETONES UR QL STRIP.AUTO: NEGATIVE MG/DL
LEUKOCYTE ESTERASE UR QL STRIP.AUTO: ABNORMAL
LYMPHOCYTES # BLD AUTO: 1.33 K/UL (ref 1–4.8)
LYMPHOCYTES NFR BLD: 13.3 % (ref 22–41)
MCH RBC QN AUTO: 30.5 PG (ref 27–33)
MCHC RBC AUTO-ENTMCNC: 31.7 G/DL (ref 33.7–35.3)
MCV RBC AUTO: 95.9 FL (ref 81.4–97.8)
MONOCYTES # BLD AUTO: 0.69 K/UL (ref 0–0.85)
MONOCYTES NFR BLD AUTO: 6.9 % (ref 0–13.4)
NEUTROPHILS # BLD AUTO: 7.18 K/UL (ref 1.82–7.42)
NEUTROPHILS NFR BLD: 71.6 % (ref 44–72)
NITRITE UR QL STRIP.AUTO: NEGATIVE
NRBC # BLD AUTO: 0 K/UL
NRBC BLD-RTO: 0 /100 WBC
PH UR STRIP.AUTO: 8.5 [PH] (ref 5–8)
PLATELET # BLD AUTO: 268 K/UL (ref 164–446)
PMV BLD AUTO: 9.6 FL (ref 9–12.9)
POTASSIUM SERPL-SCNC: 3.6 MMOL/L (ref 3.6–5.5)
PROT SERPL-MCNC: 7.7 G/DL (ref 6–8.2)
PROT UR QL STRIP: >=300 MG/DL
RBC # BLD AUTO: 3.94 M/UL (ref 4.7–6.1)
RBC UR QL AUTO: ABNORMAL
SODIUM SERPL-SCNC: 140 MMOL/L (ref 135–145)
SP GR UR: 1.01 (ref 1–1.03)
WBC # BLD AUTO: 10 K/UL (ref 4.8–10.8)

## 2020-02-16 PROCEDURE — 80053 COMPREHEN METABOLIC PANEL: CPT

## 2020-02-16 PROCEDURE — 99285 EMERGENCY DEPT VISIT HI MDM: CPT

## 2020-02-16 PROCEDURE — 36415 COLL VENOUS BLD VENIPUNCTURE: CPT

## 2020-02-16 PROCEDURE — 700105 HCHG RX REV CODE 258: Performed by: EMERGENCY MEDICINE

## 2020-02-16 PROCEDURE — 700111 HCHG RX REV CODE 636 W/ 250 OVERRIDE (IP): Performed by: EMERGENCY MEDICINE

## 2020-02-16 PROCEDURE — 96365 THER/PROPH/DIAG IV INF INIT: CPT

## 2020-02-16 PROCEDURE — 81002 URINALYSIS NONAUTO W/O SCOPE: CPT

## 2020-02-16 PROCEDURE — 85025 COMPLETE CBC W/AUTO DIFF WBC: CPT

## 2020-02-16 RX ORDER — SODIUM CHLORIDE, SODIUM LACTATE, POTASSIUM CHLORIDE, CALCIUM CHLORIDE 600; 310; 30; 20 MG/100ML; MG/100ML; MG/100ML; MG/100ML
1000 INJECTION, SOLUTION INTRAVENOUS ONCE
Status: COMPLETED | OUTPATIENT
Start: 2020-02-16 | End: 2020-02-16

## 2020-02-16 RX ORDER — CEFDINIR 300 MG/1
300 CAPSULE ORAL 2 TIMES DAILY
Qty: 20 CAP | Refills: 0 | Status: SHIPPED | OUTPATIENT
Start: 2020-02-16

## 2020-02-16 RX ORDER — SODIUM CHLORIDE 9 MG/ML
1000 INJECTION, SOLUTION INTRAVENOUS ONCE
Status: COMPLETED | OUTPATIENT
Start: 2020-02-16 | End: 2020-02-16

## 2020-02-16 RX ADMIN — SODIUM CHLORIDE, POTASSIUM CHLORIDE, SODIUM LACTATE AND CALCIUM CHLORIDE 1000 ML: 600; 310; 30; 20 INJECTION, SOLUTION INTRAVENOUS at 08:20

## 2020-02-16 RX ADMIN — SODIUM CHLORIDE 1000 ML: 9 INJECTION, SOLUTION INTRAVENOUS at 10:35

## 2020-02-16 RX ADMIN — CEFTRIAXONE SODIUM 2 G: 2 INJECTION, POWDER, FOR SOLUTION INTRAMUSCULAR; INTRAVENOUS at 10:33

## 2020-02-16 NOTE — DISCHARGE INSTRUCTIONS
Please let the home health company know that we changed out his catheter today and that we will treat for 10 days for urinary tract infection which is likely why he is falling more frequently  Please remind him to drink plenty of fluids throughout the day    Please call Primary Care Dr. Andrea Menjivar to send updated Oxygen Order for home oxygen. [employer] is Oxygen company.

## 2020-02-16 NOTE — DISCHARGE PLANNING
MSW met with pt and spouse's caregiver Wilfredo ([phone]). Wilfredo stated he stakes care of pt and spouse. Pt's niece Irene checks in on them. He confirmed that [facility] is still coming to see them. They have a nurse, CNA and PT/OT. He stated pt has walker, wheelchair and shower chair at home. Wilfredo stated he feels they don't need any further help at this time.     Pt is on Oxygen at home. Pt's O2 company is [employer]. [employer] stated pt needs an updated order as pt's orders have . MSW requested to have a portable tank delivered. MSW spoke to Wilfredo and he said there is a portable tank at pt's home. He will have pt's niece grab it from the house and then come to the hospital and take them home. MSW informed Wilfredo that his PCP will need to new O2 order. MSW also   placed note in chart on discharge instructions for f/u with PCP.     MSW updated ERP. Pt can be d/c after niece gets here with portable O2 tank.

## 2020-02-16 NOTE — DISCHARGE PLANNING
Medical Social Work    MSW spoke to ERP about pt and spouse. Pt has multiple falls and hx of dementia. [MSW did chart reivew. Pt was discharged 1/31/2020. Pt's niece and friend were providing 24/7 care. Pt also was resuming service with [facility]. MSW met with pt's wife Hannah at bedside. Hannah was no able to verify any information from previous stay. She requested MSW call her niece Irene ([phone]). MSW called and left VM with Irene. MSW awaiting call back. ERP updated.

## 2020-02-16 NOTE — ED NOTES
Pt and family given AVS and rx instructions, pt and care giver understand follow up and medication instructions. Pt wheelchair to lobby to go home.

## 2020-02-16 NOTE — ED PROVIDER NOTES
ED Provider Note    Scribed for Scar Quiroz M.D. by Leticia Barber. 2/16/2020  7:38 AM    Primary care provider: Vini Menjivar M.D.  Means of arrival: EMS  History obtained from: Wife  History limited by: Dementia    CHIEF COMPLAINT  Chief Complaint   Patient presents with   • Fall   • ALOC     hx dementia       HPI  Efra Soto is a 84 y.o. male, with a history of dementia, who presents to the Emergency Department for evaluation of injuries sustained during a ground level fall that occurred prior to arrival. Patient was hospitalized here on 1/22/2020 for a similar incident and was discharged home on 1/30/2020 . Patient has is altered at baseline secondary to dementia. Patient has a large old bruise on his chest and is unable to recall how he sustained the bruise. He denies any abdominal pain, loss of consciousness, and is unsure whether or note he hit his head when he fell. Wife states patient's mental status is the same as it is at baseline and he uses at home oxygen.    REVIEW OF SYSTEMS  Pertinent negatives include no abdominal or loss of consciouss.   See HPI for further details.     Further ROS cannot be obtained due to the patient's dementia.     PAST MEDICAL HISTORY   has a past medical history of Dementia (HCC).    SURGICAL HISTORY  patient denies any surgical history    SOCIAL HISTORY  Social History     Tobacco Use   • Smoking status: Former Smoker     Packs/day: 0.00     Start date: 9/25/1982   • Smokeless tobacco: Former User     Types: Chew   Substance Use Topics   • Alcohol use: Not Currently   • Drug use: Not Currently      Social History     Substance and Sexual Activity   Drug Use Not Currently     FAMILY HISTORY  None noted.     CURRENT MEDICATIONS  Current Outpatient Medications:   •  tamsulosin (FLOMAX) 0.4 MG capsule, Take 1 Cap by mouth ONE-HALF HOUR AFTER BREAKFAST., Disp: 30 Cap, Rfl: 1  •  omeprazole (PRILOSEC) 20 MG delayed-release capsule, Take 40 mg by mouth every day.  "Indications: Gastroesophageal Reflux Disease, Disp: , Rfl:   •  fluocinonide (LIDEX) 0.05 % Cream, Apply  to affected area(s) 2 times a day. Apply sparingly to both legs twice a day as needed for rash, Disp: , Rfl:   •  levothyroxine (SYNTHROID) 88 MCG Tab, Take 88 mcg by mouth Every morning on an empty stomach., Disp: , Rfl:   •  losartan (COZAAR) 100 MG Tab, Take 100 mg by mouth every day., Disp: , Rfl:   •  simvastatin (ZOCOR) 20 MG Tab, Take 20 mg by mouth every evening., Disp: , Rfl:   •  furosemide (LASIX) 20 MG Tab, Take 20 mg by mouth every morning., Disp: , Rfl:   •  allopurinol (ZYLOPRIM) 100 MG Tab, Take 100 mg by mouth every day., Disp: , Rfl:   •  Cholecalciferol (VITAMIN D3 PO), Take 1 Dose by mouth every day. Unknown OTC Strength., Disp: , Rfl:   •  therapeutic multivitamin-minerals (THERAGRAN-M) Tab, Take 1 Tab by mouth every day., Disp: , Rfl:     ALLERGIES  No Known Allergies    PHYSICAL EXAM  VITAL SIGNS: /110   Pulse 95   Temp 36.6 °C (97.8 °F) (Tympanic)   Resp 18   Ht 1.778 m (5' 10\")   Wt 74.8 kg (165 lb)   BMI 23.68 kg/m²   Constitutional: Well developed, Well nourished, No acute distress, Non-toxic appearance.   HENT: Normocephalic, Atraumatic, Bilateral external ears normal, Oropharynx is clear mucous membranes are dry. No oral exudates or nasal discharge.   Eyes: Pupils are equal round and reactive, EOMI, Conjunctiva normal, No discharge.   Neck: Normal range of motion, No tenderness, Supple, No stridor. No meningismus.  Cardiovascular: Regular rate and rhythm without murmur rub or gallop.  Thorax & Lungs: Clear breath sounds bilaterally without wheezes, rhonchi or rales. There is no chest wall tenderness.   Abdomen: Soft, mild abdominal tenderness, non-distended. There is no rebound or guarding. No organomegaly is appreciated. Bowel sounds are normal.  Skin: Normal without rash. Extensive old area of ecchymosis throughout lower central chest into upper abdomen, appears 7-10  " days old.    Back: No CVA or spinal tenderness.   Extremities: Intact distal pulses, No edema, No tenderness, No cyanosis, No clubbing. Capillary refill is less than 2 seconds. He is able to lift his heal up.   Musculoskeletal: Good range of motion in all major joints. No major deformities noted.  Neurologic: Alert & oriented x 3, Normal motor function, Normal sensory function, No focal deficits noted. Reflexes are normal. Follows commands.   Psychiatric: Affect normal, Judgment normal, Mood normal. There is no suicidal ideation or patient reported hallucinations. Repeats sometimes when asked questions.      DIAGNOSTIC STUDIES / PROCEDURES    LABS  Labs Reviewed   CBC WITH DIFFERENTIAL - Abnormal; Notable for the following components:       Result Value    RBC 3.94 (*)     Hemoglobin 12.0 (*)     Hematocrit 37.8 (*)     MCHC 31.7 (*)     RDW 51.1 (*)     Lymphocytes 13.30 (*)     Immature Granulocytes 1.10 (*)     Eos (Absolute) 0.58 (*)     Baso (Absolute) 0.13 (*)     All other components within normal limits   COMP METABOLIC PANEL - Abnormal; Notable for the following components:    Glucose 134 (*)     Bun 50 (*)     Creatinine 2.65 (*)     Globulin 4.1 (*)     All other components within normal limits   ESTIMATED GFR - Abnormal; Notable for the following components:    GFR If  28 (*)     GFR If Non  23 (*)     All other components within normal limits   URINALYSIS   URINALYSIS,CULTURE IF INDICATED      All labs reviewed by me.    COURSE & MEDICAL DECISION MAKING  Nursing notes, VS, PMSFHx reviewed in chart.    7:38 AM - Patient seen and examined at bedside. We had an extensive discussion about code status and wife agrees she would like him to be DNR. I will have case management complete a POLST form with wife. Ordered for UA, CBC with diff, and CMP to evaluate. Patient was treated with LR infusion for his symptoms.      10:14 AM - Reviewed lab results at this time which revealed  cystitis.  There is no evidence of leukocytosis or significant shift.  Does have some renal insufficiency secondary to dehydration with a BUN/creatinine of 50 and 2.65.  Calcium is unremarkable.  Urinalysis is concerning however does not appear to be triggering sepsis or even Sirs and therefore I think the patient could be treated at home as he is at his baseline mentation per his family/caregivers    9:53 AM - Patient was reevaluated at bedside. His vitals are stable at this time. Life skills evaluated patient and we both decided he is okay to be discharged home.  He has received Rocephin and will be transitioned to Omnicef therapy.  Life skills informed me patient has a POLST form completed and on the fridge at home that wife was not aware of. Patient will have his lockhart catheter replaced next week. He will be discharged home with prescription for Omnicef to treat the urinary tract infection.    11:15 AM - Life skills informed me patient may not have oxygen at home. Discussed potentially admitting patient if oxygen cannot be located.     11:40 AM -  says the oxygen company will be bringing an oxygen tank. He will be discharged upon receiving the oxygen tank.     HYDRATION: Based on the patient's presentation of Dehydration the patient was given IV fluids. IV Hydration was used because oral hydration was not adequate alone. Upon recheck following hydration, the patient was improved.    Patient has had high blood pressure while in the emergency department, felt likely secondary to medical condition. Counseled patient to monitor blood pressure at home and follow up with primary care physician.      The patient will return for new or worsening symptoms and is stable at the time of discharge.  Patient is given a new tank of oxygen at the point of discharge which caused a significant delay in discharge as well.  Family understands that he should be rewarded with a [employer] peanut butter cup if he drinks an  8 ounce glass of water    DISPOSITION:  Patient will be discharged home in stable condition.  He will take Omnicef for his infection over the course the next 10 days and family understands this plan of care.  He will return if fever, altered mentation of her baseline or additional/frequent falls    FINAL IMPRESSION  1. Cystitis    2. Altered mental status, unspecified altered mental status type    3. Dementia without behavioral disturbance, unspecified dementia type (HCC)    4. DNR (do not resuscitate)          Leticia ZEPEDA (Terranceibradha), am scribing for, and in the presence of, Scar Quiroz M.D..    Electronically signed by: Leticia Barber (Shannon), 2/16/2020    Scar ZEPEDA M.D. personally performed the services described in this documentation, as scribed by Leticia Barber in my presence, and it is both accurate and complete. C.    The note accurately reflects work and decisions made by me.  Scar Quiroz M.D.  2/16/2020  12:22 PM

## 2023-06-17 NOTE — CARE PLAN
Problem: Safety  Goal: Will remain free from injury  Outcome: PROGRESSING AS EXPECTED     Problem: Discharge Barriers/Planning  Goal: Patient's continuum of care needs will be met  Outcome: PROGRESSING AS EXPECTED     Problem: Skin Integrity  Goal: Risk for impaired skin integrity will decrease  Outcome: PROGRESSING AS EXPECTED     Problem: Urinary Elimination:  Goal: Ability to reestablish a normal urinary elimination pattern will improve  Outcome: PROGRESSING AS EXPECTED      Her/She